# Patient Record
Sex: FEMALE | Race: WHITE | URBAN - METROPOLITAN AREA
[De-identification: names, ages, dates, MRNs, and addresses within clinical notes are randomized per-mention and may not be internally consistent; named-entity substitution may affect disease eponyms.]

---

## 2021-10-25 ENCOUNTER — INITIAL PRENATAL (OUTPATIENT)
Dept: OBGYN CLINIC | Age: 26
End: 2021-10-25

## 2021-10-25 ENCOUNTER — HOSPITAL ENCOUNTER (OUTPATIENT)
Age: 26
Setting detail: SPECIMEN
Discharge: HOME | End: 2021-10-25
Payer: COMMERCIAL

## 2021-10-25 VITALS — SYSTOLIC BLOOD PRESSURE: 110 MMHG | DIASTOLIC BLOOD PRESSURE: 60 MMHG | WEIGHT: 173.25 LBS

## 2021-10-25 DIAGNOSIS — Z3A.10 10 WEEKS GESTATION OF PREGNANCY: ICD-10-CM

## 2021-10-25 DIAGNOSIS — Z34.91 NORMAL PREGNANCY IN FIRST TRIMESTER: Primary | ICD-10-CM

## 2021-10-25 DIAGNOSIS — Z34.91 NORMAL PREGNANCY IN FIRST TRIMESTER: ICD-10-CM

## 2021-10-25 LAB
ABO/RH: NORMAL
ABSOLUTE EOS #: 0.08 K/UL (ref 0–0.44)
ABSOLUTE IMMATURE GRANULOCYTE: 0.03 K/UL (ref 0–0.3)
ABSOLUTE LYMPH #: 1.77 K/UL (ref 1.1–3.7)
ABSOLUTE MONO #: 0.51 K/UL (ref 0.1–1.2)
AMPHETAMINE SCREEN URINE: NEGATIVE
ANTIBODY SCREEN: NEGATIVE
BARBITURATE SCREEN URINE: NEGATIVE
BASOPHILS # BLD: 1 % (ref 0–2)
BASOPHILS ABSOLUTE: 0.05 K/UL (ref 0–0.2)
BENZODIAZEPINE SCREEN, URINE: NEGATIVE
BILIRUBIN URINE: NEGATIVE
BUPRENORPHINE URINE: NORMAL
CANNABINOID SCREEN URINE: NEGATIVE
COCAINE METABOLITE, URINE: NEGATIVE
COLOR: YELLOW
COMMENT UA: NORMAL
DIFFERENTIAL TYPE: ABNORMAL
EOSINOPHILS RELATIVE PERCENT: 1 % (ref 1–4)
GLUCOSE URINE: NEGATIVE
HCT VFR BLD CALC: 37.1 % (ref 36.3–47.1)
HEMOGLOBIN: 12.2 G/DL (ref 11.9–15.1)
HEPATITIS B SURFACE ANTIGEN: NONREACTIVE
HEPATITIS C ANTIBODY: NONREACTIVE
IMMATURE GRANULOCYTES: 0 %
KETONES, URINE: NEGATIVE
LEUKOCYTE ESTERASE, URINE: NEGATIVE
LYMPHOCYTES # BLD: 22 % (ref 24–43)
MCH RBC QN AUTO: 30.3 PG (ref 25.2–33.5)
MCHC RBC AUTO-ENTMCNC: 32.9 G/DL (ref 28.4–34.8)
MCV RBC AUTO: 92.3 FL (ref 82.6–102.9)
MDMA URINE: NORMAL
METHADONE SCREEN, URINE: NEGATIVE
METHAMPHETAMINE, URINE: NORMAL
MONOCYTES # BLD: 6 % (ref 3–12)
NITRITE, URINE: NEGATIVE
NRBC AUTOMATED: 0 PER 100 WBC
OPIATES, URINE: NEGATIVE
OXYCODONE SCREEN URINE: NEGATIVE
PDW BLD-RTO: 12.9 % (ref 11.8–14.4)
PH UA: 7 (ref 5–8)
PHENCYCLIDINE, URINE: NEGATIVE
PLATELET # BLD: 276 K/UL (ref 138–453)
PLATELET ESTIMATE: ABNORMAL
PMV BLD AUTO: 11.2 FL (ref 8.1–13.5)
PROPOXYPHENE, URINE: NORMAL
PROTEIN UA: NEGATIVE
RBC # BLD: 4.02 M/UL (ref 3.95–5.11)
RBC # BLD: ABNORMAL 10*6/UL
RUBV IGG SER QL: 395.7 IU/ML
SEG NEUTROPHILS: 70 % (ref 36–65)
SEGMENTED NEUTROPHILS ABSOLUTE COUNT: 5.81 K/UL (ref 1.5–8.1)
SPECIFIC GRAVITY UA: 1.01 (ref 1–1.03)
T. PALLIDUM, IGG: NONREACTIVE
TEST INFORMATION: NORMAL
TRICYCLIC ANTIDEPRESSANTS, UR: NORMAL
TURBIDITY: CLEAR
URINE HGB: NEGATIVE
UROBILINOGEN, URINE: NORMAL
WBC # BLD: 8.3 K/UL (ref 3.5–11.3)
WBC # BLD: ABNORMAL 10*3/UL

## 2021-10-25 PROCEDURE — 0500F INITIAL PRENATAL CARE VISIT: CPT | Performed by: NURSE PRACTITIONER

## 2021-10-25 NOTE — PATIENT INSTRUCTIONS
After Hours Number: You can call the office [phone]  or [phone]  Call if you have:  1. Bleeding like a period  2. Cramps or contractions greater than 2 hours  3. If you are leaking fluid  4. If you've a fever greater than 100°  5. If you feel as if baby is not moving  6. If you have continuous vomiting over 3-4 hours        Patient was counseled on weight gain in pregnancy with the following recommendation made based on her BMI:     Singletons:  BMI <18.5: 28-40 lbs weight gain  BMI 18.5-24.9: 25-35 lbs weight gain   BMI 25-29.9: 15-25 lbs weight gain  BMI >/= 30: 11-20 lbs weight gain    Up to 16 weeks around 1 pound a month  16-28 weeks- 2-4 pounds a month  >28 weeks - around 1 pound a week due to increased growth and blood volume      Twins:  BMI <18.5: no reccomendations  BMI 18.5-24.9: 37-45 lbs weight gain  BMI 25-29.9: 31-50 lbs weight gain  BMI >/= 30: 25-42 lbs weight gain    During Pregnancy: Exercises  Your Care Instructions  Here are some examples of exercises to do during your pregnancy. Start each exercise slowly. Ease off the exercise if you start to have pain. Your doctor or physical therapist will tell you when you can start these exercises and which ones will work best for you. How to do the exercises  Neck rotation    1. Sit in a firm chair, or stand up straight. 2. Keeping your chin level, turn your head to the right, and hold for 15 to 30 seconds. 3. Turn your head to the left and hold for 15 to 30 seconds. 4. Repeat 2 to 4 times to each side. Forward neck flexion    1. Sit in a firm chair, or stand up straight. 2. Bend your head forward. 3. Hold for 15 to 30 seconds. 4. Repeat 2 to 4 times. Back press    1. Place your feet 10 to 12 inches from the wall. 2. Rest your back flat against the wall and slide down the wall until your knees are slightly bent. 3. Press your lower back against the wall by pulling in your stomach muscles.   4. Hold for 6 seconds, and then relax your stomach muscles and slide back up the wall. 5. Repeat 8 to 12 times. Full body twist    1. Sit with your legs crossed. 2. Reach your left hand toward your right foot, and place your right hand at your side for support. 3. Slowly twist your torso to your right. 4. Switch your hands and twist to your left. 5. Repeat 2 to 4 times. Pelvic rocking    1. Kneeling on hands and knees, place your hands directly under your shoulders and your knees under your hips. 2. Breathe in deeply. Tuck your head downward and round your back up, making a curve with your back in the shape of the letter C. Hold this position for a count of 6.  3. Breathe out slowly and bring your head back up. Relax, keeping your back straight (don't allow it to curve toward the floor). Hold this for a count of 6.  4. Do this exercise 8 times or to your comfort level. Pelvic tilt    1. Lie on your back. 2. Keep your knees relaxed. 3. Tighten your belly and buttocks muscles. 4. At the same time, gently shift your pelvis upward. This should flatten the curve in your back. 5. Hold for 6 seconds and then relax. 6. Gradually increase the number of tilts you do each day, to your comfort level. Backward stretch    1. Kneel on hands and knees with your knees 8 to 10 inches apart, hands directly under your shoulders, and arms and back straight. 2. Keeping your arms straight, slowly lower your buttocks toward your heels and tuck your head toward your knees. Hold for 15 to 30 seconds. 3. Slowly return to the kneeling position. 4. Repeat 2 to 4 times. Forward bend    1. Sit comfortably in a chair, with your arms relaxed. 2. Slowly bend forward, allowing your arms to hang down in front of you. Lean only as far as you can without feeling discomfort or pressure on your belly. 3. Hold for 15 to 30 seconds and then slowly sit up straight. 4. Repeat 2 to 4 times or to your comfort level. Leg lift crawl    1.  Kneeling on hands and knees, place your hands directly under your shoulders and straighten your arms. 2. Tighten your belly muscles by pulling in your belly button toward your spine. Be sure you continue to breathe normally and do not hold your breath. 3. Lift your left knee and bring it toward your elbow. 4. Slowly extend your leg behind you without completely straightening it. Be careful not to let your hip drop down. Avoid arching your back. 5. Hold your leg behind you for about 6 seconds. 6. Return to your starting position. 7. Do the same exercise with your other leg. 8. Repeat 8 to 12 times for each leg. Tailor sitting    1. Sit on the floor. 2. Bring your feet close to your body while crossing your ankles. 3. Hold this position for as long as you are comfortable. Tailor stretching    1. Sit on the floor with your back straight, legs about 12 inches apart, and feet relaxed outward. 2. Stretch your hands forward toward your left foot, then sit up. 3. Stretch your hands straight forward, then sit up. 4. Stretch your hands forward toward your right foot, then sit up. 5. Hold each stretch for 15 to 30 seconds. 6. Repeat 2 to 4 times. Follow-up care is a key part of your treatment and safety. Be sure to make and go to all appointments, and call your doctor if you are having problems. It's also a good idea to know your test results and keep a list of the medicines you take. Where can you learn more? Go to https://[employer] FoundationvitalyInStaff.dscovered. org and sign in to your [employer] account. Enter M866 in the Press box to learn more about \"During Pregnancy: Exercises. \"     If you do not have an account, please click on the \"Sign Up Now\" link. Current as of: September 5, 2018  Content Version: 12.0  © 2444-0444 Healthwise, Incorporated. Care instructions adapted under license by [address]  If you have questions about a medical condition or this instruction, always ask your healthcare professional. [employer], [facility] disclaims any warranty or liability for your use of this information. Nutrition During Pregnancy: Care Instructions  Your Care Instructions    Healthy eating when you are pregnant is important for you and your baby. It can help you feel well and have a successful pregnancy and delivery. During pregnancy your nutrition needs increase. Even if you have excellent eating habits, your doctor may recommend a multivitamin to make sure you get enough iron and folic acid. Many pregnant women wonder how much weight they should gain. In general, women who were at a healthy weight before they became pregnant should gain between 25 and 35 pounds. Women who were overweight before pregnancy are usually advised to gain 15 to 25 pounds. Women who were underweight before pregnancy are usually advised to gain 28 to 40 pounds. Your doctor will work with you to set a weight goal that is right for you. Gaining a healthy amount of weight helps you have a healthy baby. Follow-up care is a key part of your treatment and safety. Be sure to make and go to all appointments, and call your doctor if you are having problems. It's also a good idea to know your test results and keep a list of the medicines you take. How can you care for yourself at home? · Eat plenty of fruits and vegetables. Include a variety of orange, yellow, and leafy dark-green vegetables every day. · Choose whole-grain bread, cereal, and pasta. Good choices include whole wheat bread, whole wheat pasta, brown rice, and oatmeal.  · Get 4 or more servings of milk and milk products each day. Good choices include nonfat or low-fat milk, yogurt, and cheese. If you cannot eat milk products, you can get calcium from calcium-fortified products such as orange juice, soy milk, and tofu. Other non-milk sources of calcium include leafy green vegetables, such as broccoli, kale, mustard greens, turnip greens, bok evie, and brussels sprouts.   · If you Incorporated disclaims any warranty or liability for your use of this information. Managing Morning Sickness: Care Instructions  Your Care Instructions    For many women, the toughest part of early pregnancy is morning sickness. Morning sickness can range from mild nausea to severe nausea with bouts of vomiting. Symptoms may be worse in the morning, although they can strike at any time of the day or night. If you have nausea, vomiting, or both, look for safe measures that can bring you relief. You can take simple steps at home to manage morning sickness. These steps include changing what and when you eat and avoiding certain foods and smells. Some women find that acupuncture and acupressure wristbands also help. Follow-up care is a key part of your treatment and safety. Be sure to make and go to all appointments, and call your doctor if you are having problems. It's also a good idea to know your test results and keep a list of the medicines you take. How can you care for yourself at home? · Keep food in your stomach, but not too much at once. Your nausea may be worse if your stomach is empty. Eat five or six small meals a day instead of three large meals. · For morning nausea, eat a small snack, such as a couple of crackers or dry biscuits, before rising. Allow a few minutes for your stomach to settle before you get out of bed slowly. · Drink plenty of fluids, enough so that your urine is light yellow or clear like water. If you have kidney, heart, or liver disease and have to limit fluids, talk with your doctor before you increase the amount of fluids you drink. Some women find that peppermint tea helps with nausea. · Eat more protein, such as chicken, fish, lean meat, beans, nuts, and seeds. · Eat carbohydrate foods, such as potatoes, whole-grain cereals, rice, and pasta. · Avoid smells and foods that make you feel nauseated.  Spicy or high-fat foods, citrus juice, milk, coffee, and tea with caffeine often make nausea worse. · Do not drink alcohol. · Do not smoke. Try not to be around others who smoke. If you need help quitting, talk to your doctor about stop-smoking programs and medicines. These can increase your chances of quitting for good. · If you are taking iron supplements, ask your doctor if they are necessary. Iron can make nausea worse. · Get lots of rest. Stress and fatigue can make your morning sickness worse. · Ask your doctor about taking prescription medicine, or over-the-counter products such as vitamin B6, doxylamine, or donna, to relieve your symptoms. Your doctor can tell you the doses that are safe for you. · Take your prenatal vitamins at night on a full stomach. When should you call for help? Call 911 anytime you think you may need emergency care. For example, call if:    · You passed out (lost consciousness). Call your doctor now or seek immediate medical care if:    · You are sick to your stomach or cannot drink fluids. · You have symptoms of dehydration, such as:  ? Dry eyes and a dry mouth. ? Passing only a little urine. ? Feeling thirstier than usual.     · You are not able to keep down your medicine. · You have pain in your belly or pelvis. Watch closely for changes in your health, and be sure to contact your doctor if:    · You do not get better as expected. Where can you learn more? Go to https://Malharpeshaguftaeb.WeAreHolidays. org and sign in to your Updater account. Enter H945 in the [facility] box to learn more about \"Managing Morning Sickness: Care Instructions. \"     If you do not have an account, please click on the \"Sign Up Now\" link. Current as of: September 5, 2018  Content Version: 12.0  © 6033-3650 Healthwise, Incorporated. Care instructions adapted under license by [facility] ([facility]).  If you have questions about a medical condition or this instruction, always ask your healthcare professional. [address] position keeps your baby off your belly. Tuck your baby under your arm, with his or her body along the side you will be feeding on. Support your baby's upper body with your arm. With that hand you can control your baby's head to bring his or her mouth to your breast.  ? Try different positions with each feeding. If you are having problems, ask for help from your doctor or a lactation consultant. · To get your baby to latch on:  ? Support and narrow your breast with one hand using a \"U hold,\" with your thumb on the outer side of your breast and your fingers on the inner side. You can also use a \"C hold,\" with all your fingers below the nipple and your thumb above it. Try the different holds to get the deepest latch for whichever breastfeeding position you use. Your other arm is behind your baby's back, with your hand supporting the base of the baby's head. Position your fingers and thumb to point toward your baby's ears. ? You can touch your baby's lower lip with your nipple to get your baby to open his or her mouth. Wait until your baby opens up really wide, like a big yawn. Then be sure to bring the baby quickly to your breastnot your breast to the baby. As you bring your baby toward your breast, use your other hand to support the breast and guide it into his or her mouth. ? Both the nipple and a large portion of the darker area around the nipple (areola) should be in the baby's mouth. The baby's lips should be flared outward, not folded in (inverted). ? Listen for a regular sucking and swallowing pattern while the baby is feeding. If you cannot see or hear a swallowing pattern, watch the baby's ears, which will wiggle slightly when the baby swallows. If the baby's nose appears to be blocked by your breast, bring your baby's body closer to you. This will help tilt the baby's head back slightly, so just the edge of one nostril is clear for breathing. ?  When your baby is latched, you can usually remove your hand from supporting your breast and bring it under your baby to cradle him or her. Now just relax and breastfeed your baby. · You will know that your baby is feeding well when:  ? His or her mouth covers a lot of the areola, and the lips are flared out.  ? His or her chin and nose rest against your breast.  ? Sucking is deep and rhythmic, with short pauses. ? You are able to see and hear your baby swallowing. ? You do not feel pain in your nipple. · Offer both breasts to your baby at each feeding. Each time you breastfeed, switch which breast you start with. · Anytime you need to remove your baby from the breast, put one finger in the corner of his or her mouth. Push your finger between your baby's gums to gently break the seal. If you do not break the tight seal before you remove your baby, your nipples can become sore, cracked, or bruised. · After feeding your baby, gently pat his or her back to let out any swallowed air. After your baby burps, offer the breast again, or offer the other breast. Sometimes a baby will want to keep feeding after being burped. When should you call for help? Call your doctor now or seek immediate medical care if:    · You have symptoms of a breast infection, such as:  ? Increased pain, swelling, redness, or warmth around a breast.  ? Red streaks extending from the breast.  ? Pus draining from a breast.  ? A fever. · Your baby has no wet diapers for 6 hours. Watch closely for changes in your health, and be sure to contact your doctor if:    · Your baby has trouble latching on to your breast.     · You continue to have pain or discomfort when breastfeeding. · You have other questions or concerns. Where can you learn more? Go to https://TapteravitalyFastly.Mobisante. org and sign in to your dermSearch account. Enter P492 in the Postdeck box to learn more about \"Breastfeeding: Care Instructions. \"     If you do not have an account, please click on the \"Sign Up Now\" link. Current as of: September 5, 2018  Content Version: 12.0  © 6050-6103 [employer]. Care instructions adapted under license by [facility] ([facility]). If you have questions about a medical condition or this instruction, always ask your healthcare professional. [address] any warranty or liability for your use of this information. Learning About Birth Defects Testing  What is birth defects testing? Birth defects testing is done during pregnancy to look for possible problems with the baby (fetus). A birth defect may have only a minor impact on a child's life. Or it can have a major effect on quality or length of life. You and your doctor can choose from many tests. You may have no tests, one test, or many tests. Talking with a genetic counselor may help you make decisions about testing. The counselor is trained to help you understand these tests. He or she can also help you find resources for support. What are the types of tests? You may have a screening test or a diagnostic test. Or you may have both types of tests. Screening tests show the chance that a baby has a certain birth defect, such as Down syndrome, spina bifida, or trisomy 25. Diagnostic tests show if a baby has a certain birth defect. · Screening tests and when they are done:  ? Blood tests at 10 to 13 weeks (first trimester)  ? Cell free fetal DNA test at 10 weeks or later (first trimester)  ? Nuchal translucency test at 11 to 14 weeks (first trimester)  ? Triple or quad screening at 15 to 20 weeks (second trimester)  ? Ultrasound at 18 to 20 weeks (second trimester)  · Diagnostic tests and when they are done:  ? Chorionic villus sampling (CVS) at 10 to 13 weeks (first trimester)  ? Amniocentesis at 13 to 20 weeks (second trimester)  In some cases, the doctors look at the combined screenings that you've had over a period of time. This is called an integrated screening.   What are the screening tests?  · Blood tests are done to look at different substances in your blood. These tests include cell free fetal DNA and triple or quadruple (quad) blood tests. Both of these tests can help find genetic problems. · Nuchal translucency test uses ultrasound to measure the thickness of the area at the back of the baby's neck. An increase in thickness can be an early sign of certain birth defects. Ultrasound is a tool that uses sound waves to make pictures of your baby and placenta inside the uterus. · Ultrasound lets your doctor see an image of your baby. It can help your doctor look for problems of the heart, spine, belly, or other areas. What are the diagnostic tests? Chorionic villus sampling (CVS) looks at cells from the placenta. To do the test, your doctor may put a thin tube through your vagina and cervix to take out a small piece of the placenta. Or the doctor may take out the piece through a needle in your belly. This test can diagnose many genetic diseases. But it can't find problems with the spinal cord. Amniocentesis looks at the amniotic fluid that surrounds your baby. Your doctor will put a needle through your belly into your uterus and take out a very small amount of fluid to test.  What are the risks of these tests? There is a small risk of a miscarriage after a CVS or amniocentesis. Your doctor or genetic counselor can help you understand this risk. These tests are generally very safe. Where can you learn more? Go to https://Athlete BuilderpePocket Communications Northeast.Xterprise Solutions. org and sign in to your Chronogolf account. Enter G030 in the [facility] box to learn more about \"Learning About Birth Defects Testing. \"     If you do not have an account, please click on the \"Sign Up Now\" link. Current as of: September 5, 2018  Content Version: 12.0  © 4076-3337 Healthwise, Incorporated. Care instructions adapted under license by [facility] ([facility]).  If you have questions about a medical condition or this instruction, always ask your healthcare professional. [place] [address] any warranty or liability for your use of this information.

## 2021-10-25 NOTE — PROGRESS NOTES
OB History    Para Term  AB Living   1             SAB TAB Ectopic Molar Multiple Live Births                    # Outcome Date GA Lbr Yared/2nd Weight Sex Delivery Anes PTL Lv   1 Current               Sebastian May is being seen today for her new obstetrical visit. The pregnancy is  a planned pregnancy. She is  accepting at this time. Her last period was Patient's last menstrual period was 2021. .  This was a sure and definite menses. She was not on OCP at conception. 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[address] Avenue:  [address] [place] [address]    Teratogen exposure since LMP: (OTC/prescription/ETOH/drugs):   denies    History of prior GBS-infected child:  NA  Recent travel outside of country (partner also):  no    Known COVID/Zika exposure (partner also): no  Any history of genetic or chromosomal aberations in herself the father to be or their respective families: no  Any histories of spina bifida or abdominal wall defects; omphalocele's or gastroschisis no  Hx Hypothyroidism: denies  Hx preeclampsia or HTN:  denies  Hx PPD: N/A  Hx Diabetes: denies  Hx GDM: N/A  First degree relative with diabetes: denies       Not on File  No current outpatient medications on file. No current facility-administered medications for this visit. No past medical history on file. No past surgical history on file. Swelling: no  Bleeding: no  Discharge: no   Dysuria: no  Nausea: yes -  encouraged small frequent meals, and vitamin B6 and unisom if needed. Heartburn: no  Epigastric pain: no       Office protocol reviewed, After hours number provided.    Diet and exercise reveiwed  Warning signs reviewed  Testing reviewed     Q&A  Discussed in detail referral/orders for  for 1st trimester screen vs NIPSinfo provided for screening  Discussed with patient that if they proceed with the NIPs testing then they will be opting out of 1st trimester screening which can provide information in regards to placental health, congenital heart defects, metabolic abnormalities, and anatomic abnormalities. Patient is aware and has decided to: nure eric till disucss further and let us know  Discussed dates and orders for Anatomy USd and fetal echo if indicated. Breastfeeding education. She verbally consented to HIV testing and drug screening. She was counseled on Toxoplasmosis/Listeriosis (cats/raw meat). Prenatal Vitamins recommended. Pelvic exam completed PAP and cultures obtained and sent. Prenatal labs and dating us ordered. RV prn/ 4 weeks     Of the 30 minute duration appointment visit, Gracelyn Holstein CNP spent at least 50% of the face-to-face time in counseling, explanation of diagnosis, planning of further management, and answering all questions.

## 2021-10-26 LAB
DIRECT EXAM: NORMAL
HIV AG/AB: NONREACTIVE
Lab: NORMAL
SPECIMEN DESCRIPTION: NORMAL

## 2021-10-27 LAB
CHLAMYDIA BY THIN PREP: NEGATIVE
N. GONORRHOEAE DNA, THIN PREP: NEGATIVE
SPECIMEN DESCRIPTION: NORMAL
VZV IGG SER QL IA: 2.46

## 2021-10-29 LAB — CYTOLOGY REPORT: NORMAL

## 2021-11-04 DIAGNOSIS — Z34.91 NORMAL PREGNANCY IN FIRST TRIMESTER: Primary | ICD-10-CM

## 2021-11-04 DIAGNOSIS — Z3A.11 11 WEEKS GESTATION OF PREGNANCY: ICD-10-CM

## 2021-11-10 ENCOUNTER — ROUTINE PRENATAL (OUTPATIENT)
Dept: PERINATAL CARE | Age: 26
End: 2021-11-10
Payer: COMMERCIAL

## 2021-11-10 VITALS
WEIGHT: 176 LBS | SYSTOLIC BLOOD PRESSURE: 120 MMHG | HEART RATE: 87 BPM | HEIGHT: 66 IN | DIASTOLIC BLOOD PRESSURE: 78 MMHG | TEMPERATURE: 97.4 F | RESPIRATION RATE: 16 BRPM | BODY MASS INDEX: 28.28 KG/M2

## 2021-11-10 DIAGNOSIS — O36.80X0 ENCOUNTER TO DETERMINE FETAL VIABILITY OF PREGNANCY, SINGLE OR UNSPECIFIED FETUS: ICD-10-CM

## 2021-11-10 DIAGNOSIS — Z3A.12 12 WEEKS GESTATION OF PREGNANCY: ICD-10-CM

## 2021-11-10 DIAGNOSIS — Z36.9 FIRST TRIMESTER SCREENING: Primary | ICD-10-CM

## 2021-11-10 LAB
CRL: NORMAL
SAC DIAMETER: NORMAL

## 2021-11-10 PROCEDURE — 76813 OB US NUCHAL MEAS 1 GEST: CPT | Performed by: OBSTETRICS & GYNECOLOGY

## 2021-11-10 PROCEDURE — 76801 OB US < 14 WKS SINGLE FETUS: CPT | Performed by: OBSTETRICS & GYNECOLOGY

## 2021-11-22 ENCOUNTER — ROUTINE PRENATAL (OUTPATIENT)
Dept: OBGYN CLINIC | Age: 26
End: 2021-11-22

## 2021-11-22 VITALS — WEIGHT: 182 LBS | DIASTOLIC BLOOD PRESSURE: 72 MMHG | SYSTOLIC BLOOD PRESSURE: 118 MMHG | BODY MASS INDEX: 29.38 KG/M2

## 2021-11-22 DIAGNOSIS — Z3A.14 14 WEEKS GESTATION OF PREGNANCY: ICD-10-CM

## 2021-11-22 DIAGNOSIS — Z34.92 NORMAL PREGNANCY IN SECOND TRIMESTER: Primary | ICD-10-CM

## 2021-11-22 PROCEDURE — 0502F SUBSEQUENT PRENATAL CARE: CPT | Performed by: NURSE PRACTITIONER

## 2021-11-22 NOTE — PROGRESS NOTES
Presents for OB visit  Gestation 14w0d  Estimated Date of Delivery: 22    G1  - Jemima Lagunas  Undecided if NIPS or FTS  Bill under Dr. Jyoti Juárez           The patient was seen and evaluated. There was Positive fetal movements no. No contractions or leakage of fluid. Signs and symptoms of  labor as well as labor were reviewed. The Nuchal Translucency testing was reviewed and found to be completed. A single marker MSAFP was ordered/ declined for a 15-20 week gestational age window. TOP ST OH Reviewed. Dates were reviewed with the patient. An 18-22 week anatomy ultrasound has been ordered and scheduled 22. The patient will return to the office for her next visit in 4 weeks. See antepartum flow sheet. OB History    Para Term  AB Living   1             SAB IAB Ectopic Molar Multiple Live Births                    # Outcome Date GA Lbr Yared/2nd Weight Sex Delivery Anes PTL Lv   1 Current                     Allergies   Allergen Reactions    Latex      Current Outpatient Medications   Medication Sig Dispense Refill    Prenatal Vit-Fe Fumarate-FA (PRENATAL PO) Take by mouth       No current facility-administered medications for this visit. No past medical history on file. No past surgical history on file. Headaches: no  Swelling: no  Bleeding: no  Discharge: no   Dysuria: no  Nausea: no  Heartburn: no  Epigastric pain: no        Return 4 WEEKS    Labs as indicated AFP    PTL signs reviewed, if indicated  Kick Count Instructions given if indicated: The patient was instructed on fetal kick counts and was given a kick sheet to complete every 8 hours. This is to begin at 28 weeks gestation. She was instructed that the baby should move at a minimum of ten times within one hour after a meal. The patient was instructed to lay down on her left side twenty minutes after eating and count movements for up to one hour with a target value of ten movements.    She was instructed to notify the office if she did not make that target after two attempts or if after any attempt there was less than four movements. After hour numbers reviewed , along with labor signs if indicated. Contractions/cramping between 5-7 minutes and persisting even with attempts of increased water and laying on side. Fluid leakage, bleeding        Of the 20 minute duration appointment visit, Chito Cole CNP spent at least 50% of the face-to-face time in counseling, explanation of diagnosis, planning of further management, and answering all questions.

## 2021-11-22 NOTE — PATIENT INSTRUCTIONS
Patient Education        Weeks 14 to 18 of Your Pregnancy: Care Instructions  Overview     During this time, you may start to \"show,\" so that you look pregnant to people around you. You may also notice some changes in your skin, such as itchy spots on your palms or acne on your face. Your baby is now able to pass urine. And your baby's first stool (meconium) is starting to collect in your baby's intestines. Hair is also starting to grow on your baby's head. At your next visit, between weeks 18 and 20, your doctor may do an ultrasound test. The test allows your doctor to check for certain problems. Your doctor can also tell the sex of your baby. So this a good time to think about whether you want to know. Talk to your doctor about getting a flu shot to help keep you healthy during your pregnancy. As your pregnancy moves along, it's common to worry or feel anxious. Your body is changing a lot. And you are thinking about giving birth, the health of your baby, and becoming a parent. You can talk to your doctor about any anxiety and stress you feel. Follow-up care is a key part of your treatment and safety. Be sure to make and go to all appointments, and call your doctor if you are having problems. It's also a good idea to know your test results and keep a list of the medicines you take. How can you care for yourself at home? Reduce stress    · Ask for help with cooking and housekeeping.     · Figure out who or what causes your stress. Avoid these people or situations as much as possible.     · Relax every day. Taking 10- to 15-minute breaks can make a big difference. Take a walk, listen to music, or take a warm bath.     · Learn relaxation techniques at prenatal or yoga class. Or buy a relaxation tape.     · List your fears about having a baby and becoming a parent. Share the list with someone you trust. Decide which worries are really small, and try to let them go.    Exercise    · If you did not exercise much or this instruction, always ask your healthcare professional. [place] [address] any warranty or liability for your use of this information.

## 2021-12-22 ENCOUNTER — ROUTINE PRENATAL (OUTPATIENT)
Dept: OBGYN CLINIC | Age: 26
End: 2021-12-22

## 2021-12-22 VITALS
BODY MASS INDEX: 29.79 KG/M2 | SYSTOLIC BLOOD PRESSURE: 112 MMHG | DIASTOLIC BLOOD PRESSURE: 68 MMHG | WEIGHT: 184.56 LBS

## 2021-12-22 DIAGNOSIS — Z34.92 NORMAL PREGNANCY IN SECOND TRIMESTER: Primary | ICD-10-CM

## 2021-12-22 DIAGNOSIS — Z3A.18 18 WEEKS GESTATION OF PREGNANCY: ICD-10-CM

## 2021-12-22 PROCEDURE — 0502F SUBSEQUENT PRENATAL CARE: CPT | Performed by: OBSTETRICS & GYNECOLOGY

## 2021-12-22 NOTE — PROGRESS NOTES
Ruben Carlton is a 32 y.o. female 22w1d        OB History    Para Term  AB Living   1             SAB IAB Ectopic Molar Multiple Live Births                    # Outcome Date GA Lbr Yared/2nd Weight Sex Delivery Anes PTL Lv   1 Current                Vitals  BP: 112/68  Weight: 184 lb 9 oz (83.7 kg)    The patient was seen and evaluated. No contractions or leakage of fluid. Signs and symptoms of  labor as well as labor were reviewed. The Nuchal Translucency testing was reviewed and found to be normal A single marker MSAFP was reviewed and found to be ordered. The patients anatomy ultrasound has been completed and reviewed with patient. The S/S of Pre-Eclampsia were reviewed with the patient in detail. She is to report any of these if they occur. She currently denies any of these. The patient is RH positive Rhogam Ordered no    The patient was instructed on fetal kick counts and was given a kick sheet to complete every 8 hours. This is to begin at 28 weeks gestation. She was instructed that the baby should move at a minimum of ten times within one hour after a meal. The patient was instructed to lay down on her left side twenty minutes after eating and count movements for up to one hour with a target value of ten movements. She was instructed to notify the office if she did not make that target after two attempts or if after any attempt there was less than four movements. G1  - Marvel  FTS normal  Bill under Dr. Mansoor Young:  1. Ruben Carlton is a 32 y.o. female  2.   3. 18w2d    There are no problems to display for this patient. Diagnosis Orders   1. Normal pregnancy in second trimester     2. 18 weeks gestation of pregnancy           Plan:  The patient will return to the office for her next visit in 4 weeks. See antepartum flow sheet. [facility] anatomy ultrasound scheduled        Patient was seen with total face to face time of 20 minutes.  More than 50% of this visit was on counseling and education regarding her    Diagnosis Orders   1. Normal pregnancy in second trimester     2. 18 weeks gestation of pregnancy      and her options. She was also counseled on her preventative health maintenance recommendations and follow-up.

## 2022-01-06 ENCOUNTER — ROUTINE PRENATAL (OUTPATIENT)
Dept: PERINATAL CARE | Age: 27
End: 2022-01-06
Payer: COMMERCIAL

## 2022-01-06 VITALS
RESPIRATION RATE: 16 BRPM | SYSTOLIC BLOOD PRESSURE: 124 MMHG | DIASTOLIC BLOOD PRESSURE: 66 MMHG | TEMPERATURE: 98.4 F | WEIGHT: 187 LBS | HEART RATE: 88 BPM | BODY MASS INDEX: 30.05 KG/M2 | HEIGHT: 66 IN

## 2022-01-06 DIAGNOSIS — Z3A.20 20 WEEKS GESTATION OF PREGNANCY: ICD-10-CM

## 2022-01-06 DIAGNOSIS — O99.212 OBESITY AFFECTING PREGNANCY IN SECOND TRIMESTER: ICD-10-CM

## 2022-01-06 DIAGNOSIS — Z36.86 SCREENING, ANTENATAL, FOR RISK OF PRE-TERM LABOR: ICD-10-CM

## 2022-01-06 DIAGNOSIS — O44.40 LOW IMPLANTATION OF PLACENTA WITHOUT HEMORRHAGE: Primary | ICD-10-CM

## 2022-01-06 LAB
ABDOMINAL CIRCUMFERENCE: NORMAL
ABDOMINAL CIRCUMFERENCE: NORMAL
BIPARIETAL DIAMETER: NORMAL
BIPARIETAL DIAMETER: NORMAL
ESTIMATED FETAL WEIGHT: NORMAL
ESTIMATED FETAL WEIGHT: NORMAL
FEMORAL DIAMETER: NORMAL
FEMORAL DIAMETER: NORMAL
HC/AC: NORMAL
HC/AC: NORMAL
HEAD CIRCUMFERENCE: NORMAL
HEAD CIRCUMFERENCE: NORMAL

## 2022-01-06 PROCEDURE — 76817 TRANSVAGINAL US OBSTETRIC: CPT | Performed by: OBSTETRICS & GYNECOLOGY

## 2022-01-06 PROCEDURE — 76811 OB US DETAILED SNGL FETUS: CPT | Performed by: OBSTETRICS & GYNECOLOGY

## 2022-01-19 ENCOUNTER — ROUTINE PRENATAL (OUTPATIENT)
Dept: OBGYN CLINIC | Age: 27
End: 2022-01-19

## 2022-01-19 VITALS — BODY MASS INDEX: 30.83 KG/M2 | DIASTOLIC BLOOD PRESSURE: 72 MMHG | SYSTOLIC BLOOD PRESSURE: 126 MMHG | WEIGHT: 191 LBS

## 2022-01-19 DIAGNOSIS — Z34.92 NORMAL PREGNANCY IN SECOND TRIMESTER: Primary | ICD-10-CM

## 2022-01-19 DIAGNOSIS — Z3A.22 22 WEEKS GESTATION OF PREGNANCY: ICD-10-CM

## 2022-01-19 PROCEDURE — 0502F SUBSEQUENT PRENATAL CARE: CPT | Performed by: OBSTETRICS & GYNECOLOGY

## 2022-01-20 NOTE — PROGRESS NOTES
Tamra Bee is a 32 y.o. female 24w4d        OB History    Para Term  AB Living   1             SAB IAB Ectopic Molar Multiple Live Births                    # Outcome Date GA Lbr Yared/2nd Weight Sex Delivery Anes PTL Lv   1 Current                Vitals  BP: 126/72  Weight: 191 lb (86.6 kg)  Patient Position: Sitting  Fetal Heart Rate: 155  Movement: Present    + FM  NO VB  NO LOF  NO CTX  No complaints or issues    G1  - Marvel  FTS normal  Bill under Dr. Griffin Vazquez:  1. Tamra Bee is a 32 y.o. female  2.   3. 22w2d       Diagnosis Orders   1. Normal pregnancy in second trimester     2. 22 weeks gestation of pregnancy           Plan:  The patient will return to the office for her next visit in 4 weeks. See antepartum flow sheet.

## 2022-01-24 DIAGNOSIS — O28.9 ABNORMAL FIRST TRIMESTER SCREEN: Primary | ICD-10-CM

## 2022-02-16 ENCOUNTER — ROUTINE PRENATAL (OUTPATIENT)
Dept: OBGYN CLINIC | Age: 27
End: 2022-02-16

## 2022-02-16 VITALS
SYSTOLIC BLOOD PRESSURE: 116 MMHG | DIASTOLIC BLOOD PRESSURE: 70 MMHG | WEIGHT: 198.38 LBS | BODY MASS INDEX: 32.02 KG/M2

## 2022-02-16 DIAGNOSIS — Z3A.26 26 WEEKS GESTATION OF PREGNANCY: ICD-10-CM

## 2022-02-16 DIAGNOSIS — Z34.92 NORMAL PREGNANCY IN SECOND TRIMESTER: Primary | ICD-10-CM

## 2022-02-16 PROCEDURE — 0502F SUBSEQUENT PRENATAL CARE: CPT | Performed by: OBSTETRICS & GYNECOLOGY

## 2022-02-17 NOTE — PROGRESS NOTES
Chayito Hernandez is a 32 y.o. female 34w1d        OB History    Para Term  AB Living   1             SAB IAB Ectopic Molar Multiple Live Births                    # Outcome Date GA Lbr Yared/2nd Weight Sex Delivery Anes PTL Lv   1 Current                Vitals  BP: 116/70  Weight: 198 lb 6 oz (90 kg)  Patient Position: Sitting      The patient was seen and evaluated. There was positive fetal movements. No contractions or leakage of fluid. Signs and symptoms of  labor as well as labor were reviewed. The S/S of Pre-Eclampsia were reviewed with the patient in detail. She is to report any of these if they occur. She currently denies any of these. The patient had her 28 week labs ordered. No visits with results within 5 Week(s) from this visit. Latest known visit with results is:   Hospital Outpatient Visit on 10/25/2021   Component Date Value Ref Range Status    Hepatitis C Ab 10/25/2021 NONREACTIVE  NONREACTIVE Final    Comment:       The hepatitis C procedure used in our laboratory is a Chemiluminescent test specific for   three recombinant HCV antigens. A negative anti-HCV result indicates that the antibodies to   hepatitis C virus are not present at this time. Individuals with reactive anti-HCV should be considered infected and infectious until proven   otherwise. Confirmation of all equivocal or reactive results is recommended by ordering   HCV RNA by PCR.       VARICELLA ZOSTER AB IGG 10/25/2021 2.46  >1.09 Final    Comment:    Interpretation:  IMMUNE     Reference Range:  <0.91         Not Immune  0.91-1.09     Equivocal  >1.09         Immune         ABO/Rh 10/25/2021 O POSITIVE   Final    Antibody Screen 10/25/2021 NEGATIVE   Final    WBC 10/25/2021 8.3  3.5 - 11.3 k/uL Final    RBC 10/25/2021 4.02  3.95 - 5.11 m/uL Final    Hemoglobin 10/25/2021 12.2  11.9 - 15.1 g/dL Final    Hematocrit 10/25/2021 37.1  36.3 - 47.1 % Final    MCV 10/25/2021 92.3  82.6 - 102.9 fL Final    MCH 10/25/2021 30.3  25.2 - 33.5 pg Final    MCHC 10/25/2021 32.9  28.4 - 34.8 g/dL Final    RDW 10/25/2021 12.9  11.8 - 14.4 % Final    Platelets 43/79/5667 276  138 - 453 k/uL Final    MPV 10/25/2021 11.2  8.1 - 13.5 fL Final    NRBC Automated 10/25/2021 0.0  0.0 per 100 WBC Final    Differential Type 10/25/2021 NOT REPORTED   Final    Seg Neutrophils 10/25/2021 70* 36 - 65 % Final    Lymphocytes 10/25/2021 22* 24 - 43 % Final    Monocytes 10/25/2021 6  3 - 12 % Final    Eosinophils % 10/25/2021 1  1 - 4 % Final    Basophils 10/25/2021 1  0 - 2 % Final    Immature Granulocytes 10/25/2021 0  0 % Final    Segs Absolute 10/25/2021 5.81  1.50 - 8.10 k/uL Final    Absolute Lymph # 10/25/2021 1.77  1.10 - 3.70 k/uL Final    Absolute Mono # 10/25/2021 0.51  0.10 - 1.20 k/uL Final    Absolute Eos # 10/25/2021 0.08  0.00 - 0.44 k/uL Final    Basophils Absolute 10/25/2021 0.05  0.00 - 0.20 k/uL Final    Absolute Immature Granulocyte 10/25/2021 0.03  0.00 - 0.30 k/uL Final    WBC Morphology 10/25/2021 NOT REPORTED   Final    RBC Morphology 10/25/2021 NOT REPORTED   Final    Platelet Estimate 93/47/7676 NOT REPORTED   Final    Hepatitis B Surface Ag 10/25/2021 NONREACTIVE  NONREACTIVE Final    Rubella Antibody, IGG 10/25/2021 395.7  IU/mL Final    Comment:             REFERENCE RANGE:  <5.0       NON-REACTIVE (non-immune)  5.0 TO 9.9 EQUIVOCAL  >=10.0     REACTIVE     (immune)            T. pallidum, IgG 10/25/2021 NONREACTIVE  NONREACTIVE Final    Comment:       T. pallidum antibodies are not detected. There is no serological evidence of infection with T. pallidum (early primary syphilis   cannot be excluded). Retest in 2-4 weeks if syphilis is clinically suspect.  HIV Ag/Ab 10/25/2021 NONREACTIVE  NONREACTIVE Final    Comment: No laboratory evidence of HIV infection. If acute HIV infection is suspected, consider   testing for HIV-1 RNA.       Amphetamine Screen, Ur 10/25/2021 NEGATIVE  NEGATIVE Final    Comment:       (Positive cutoff 1000 ng/mL)                  Barbiturate Screen, Ur 10/25/2021 NEGATIVE  NEGATIVE Final    Comment:       (Positive cutoff 200 ng/mL)                  Benzodiazepine Screen, Urine 10/25/2021 NEGATIVE  NEGATIVE Final    Comment:       (Positive cutoff 200 ng/mL)                  Cocaine Metabolite, Urine 10/25/2021 NEGATIVE  NEGATIVE Final    Comment:       (Positive cutoff 300 ng/mL)                  Methadone Screen, Urine 10/25/2021 NEGATIVE  NEGATIVE Final    Comment:       (Positive cutoff 300 ng/mL)                  Opiates, Urine 10/25/2021 NEGATIVE  NEGATIVE Final    Comment:       (Positive cutoff 300 ng/mL)                  Phencyclidine, Urine 10/25/2021 NEGATIVE  NEGATIVE Final    Comment:       (Positive cutoff 25 ng/mL)                  Propoxyphene, Urine 10/25/2021 NOT REPORTED  NEGATIVE Final    Cannabinoid Scrn, Ur 10/25/2021 NEGATIVE  NEGATIVE Final    Comment:       (Positive cutoff 50 ng/mL)                  Oxycodone Screen, Ur 10/25/2021 NEGATIVE  NEGATIVE Final    Comment:       (Positive cutoff 100 ng/mL)                  Methamphetamine, Urine 10/25/2021 NOT REPORTED  NEGATIVE Final    Tricyclic Antidepressants, Urine 10/25/2021 NOT REPORTED  NEGATIVE Final    MDMA, Urine 10/25/2021 NOT REPORTED  NEGATIVE Final    Buprenorphine Urine 10/25/2021 NOT REPORTED  NEGATIVE Final    Test Information 10/25/2021 Assay provides medical screening only. The absence of expected drug(s) and/or metabolite(s) may indicate diluted or adulterated urine, limitations of testing or timing of collection. Final    Comment: Testing for legal purposes should be confirmed by another method. To request confirmation   of test result, please call the lab within 7 days of sample submission.       Color, UA 10/25/2021 Yellow  Yellow Final    Turbidity UA 10/25/2021 Clear  Clear Final    Glucose, Ur 10/25/2021 NEGATIVE  NEGATIVE Final    Bilirubin Urine 10/25/2021 NEGATIVE  NEGATIVE Final    Ketones, Urine 10/25/2021 NEGATIVE  NEGATIVE Final    Specific Venus, UA 10/25/2021 1.010  1.005 - 1.030 Final    Urine Hgb 10/25/2021 NEGATIVE  NEGATIVE Final    pH, UA 10/25/2021 7.0  5.0 - 8.0 Final    Protein, UA 10/25/2021 NEGATIVE  NEGATIVE Final    Urobilinogen, Urine 10/25/2021 Normal  Normal Final    Nitrite, Urine 10/25/2021 NEGATIVE  NEGATIVE Final    Leukocyte Esterase, Urine 10/25/2021 NEGATIVE  NEGATIVE Final    Urinalysis Comments 10/25/2021 Microscopic exam not performed based on chemical results unless requested in original order. Final    Specimen Description 10/25/2021 . CERVIX   Final    Chlamydia By Thin Prep 10/25/2021 NEGATIVE  NEGATIVE Final    Comment: CHLAMYDIA TRACHOMATIS DNA not detected by nucleic acid amplification. This test is intended for medical purposes only and is not valid for the evaluation of   suspected sexual abuse or for other forensic purposes. In certain contexts, culture may be required to meet applicable laws and regulations for   diagnosis of C. trachomatis and N. gonorrhoeae infections. Per 2014  CDC recommendations, this test does not include confirmation of positive results   by an alternative nucleic acid target.  N. gonorrhoeae DNA, Thin Prep 10/25/2021 NEGATIVE  NEGATIVE Final    Comment: NEISSERIA GONORRHOEAE DNA not detected by nucleic acid amplification. This test is intended for medical purposes only and is not valid for the evaluation of   suspected sexual abuse or for other forensic purposes. In certain contexts, culture may be required to meet applicable laws and regulations for   diagnosis of C. trachomatis and N. gonorrhoeae infections. Per 2014  CDC recommendations, this test does not include confirmation of positive results   by an alternative nucleic acid target.  Specimen Description 10/25/2021 . VAGINA   Final    Special Requests 10/25/2021 NOT REPORTED Final    Direct Exam 10/25/2021 NEGATIVE for Gardnerella vaginalis   Final    Direct Exam 10/25/2021 NEGATIVE for Candida sp. Final    Direct Exam 10/25/2021 NEGATIVE for Trichomonas vaginalis   Final    Direct Exam 10/25/2021 Method of testing is a DNA probe intended for detection and identification of Candida species, Gardnerella vaginalis, and Trichomonas vaginalis nucleic acid in vaginal fluid specimens from patients with symptoms of vaginitis/vaginosis. Final    Cytology Report 10/25/2021    Final                    Value:INTERPRETATION    Cervical material, (ThinPrep vial, Imaging-assisted review):  Specimen Adequacy:       Satisfactory for evaluation.       - Endocervical/transformation zone component present. Descriptive Diagnosis:       Negative for intraepithelial lesion or malignancy. Cytotechnologist:   AMY Alfaro(ASCP)  **Electronically Signed Out**  /10/29/2021            Source:  1: Cervical material, (ThinPrep vial, Imaging-assisted review)    Clinical History  Pregnant  High Risk HPV DNA testing is requested if the diagnosis is ASC-US  LMP:  8/16/2021  GYNECOLOGIC CYTOLOGY REPORT    Patient Name: Maxx Haque: [id number]  Path Number: [id number]  [facility] [address] [place], [address]  [phone]  Fax: [phone]     ]    G1  - Virginie day  Bill under Dr. Louie Murdock       T-Dap Vaccine (27-36 weeks): awaiting    The patient was instructed on fetal kick counts and was given a kick sheet to complete every 8 hours. She was instructed that the baby should move at a minimum of ten times within one hour after a meal. The patient was instructed to lay down on her left side twenty minutes after eating and count movements for up to one hour with a target value of ten movements.   She was instructed to notify the office if she did not make that target after two attempts or if

## 2022-02-18 ENCOUNTER — HOSPITAL ENCOUNTER (OUTPATIENT)
Age: 27
Setting detail: SPECIMEN
Discharge: HOME | End: 2022-02-18

## 2022-02-18 DIAGNOSIS — Z34.92 NORMAL PREGNANCY IN SECOND TRIMESTER: ICD-10-CM

## 2022-02-18 LAB
-: ABNORMAL
ABSOLUTE EOS #: 0.05 K/UL (ref 0–0.44)
ABSOLUTE IMMATURE GRANULOCYTE: 0.05 K/UL (ref 0–0.3)
ABSOLUTE LYMPH #: 1.25 K/UL (ref 1.1–3.7)
ABSOLUTE MONO #: 0.46 K/UL (ref 0.1–1.2)
AMORPHOUS: ABNORMAL
BACTERIA: ABNORMAL
BASOPHILS # BLD: 0 % (ref 0–2)
BASOPHILS ABSOLUTE: 0.03 K/UL (ref 0–0.2)
BILIRUBIN URINE: NEGATIVE
CASTS UA: ABNORMAL /LPF (ref 0–8)
COLOR: YELLOW
CRYSTALS, UA: ABNORMAL /HPF
DIFFERENTIAL TYPE: ABNORMAL
EOSINOPHILS RELATIVE PERCENT: 1 % (ref 1–4)
EPITHELIAL CELLS UA: ABNORMAL /HPF (ref 0–5)
GLUCOSE ADMINISTRATION: NORMAL
GLUCOSE TOLERANCE SCREEN 50G: 134 MG/DL (ref 70–135)
GLUCOSE URINE: NEGATIVE
HCT VFR BLD CALC: 37.4 % (ref 36.3–47.1)
HEMOGLOBIN: 12.4 G/DL (ref 11.9–15.1)
IMMATURE GRANULOCYTES: 1 %
KETONES, URINE: ABNORMAL
LEUKOCYTE ESTERASE, URINE: NEGATIVE
LYMPHOCYTES # BLD: 15 % (ref 24–43)
MCH RBC QN AUTO: 31.8 PG (ref 25.2–33.5)
MCHC RBC AUTO-ENTMCNC: 33.2 G/DL (ref 28.4–34.8)
MCV RBC AUTO: 95.9 FL (ref 82.6–102.9)
MONOCYTES # BLD: 6 % (ref 3–12)
MUCUS: ABNORMAL
NITRITE, URINE: NEGATIVE
NRBC AUTOMATED: 0 PER 100 WBC
OTHER OBSERVATIONS UA: ABNORMAL
PDW BLD-RTO: 13.2 % (ref 11.8–14.4)
PH UA: 5.5 (ref 5–8)
PLATELET # BLD: 255 K/UL (ref 138–453)
PLATELET ESTIMATE: ABNORMAL
PMV BLD AUTO: 11.3 FL (ref 8.1–13.5)
PROTEIN UA: NEGATIVE
RBC # BLD: 3.9 M/UL (ref 3.95–5.11)
RBC # BLD: ABNORMAL 10*6/UL
RBC UA: ABNORMAL /HPF (ref 0–4)
RENAL EPITHELIAL, UA: ABNORMAL /HPF
SEG NEUTROPHILS: 77 % (ref 36–65)
SEGMENTED NEUTROPHILS ABSOLUTE COUNT: 6.37 K/UL (ref 1.5–8.1)
SPECIFIC GRAVITY UA: 1.02 (ref 1–1.03)
TRICHOMONAS: ABNORMAL
TURBIDITY: CLEAR
URINE HGB: NEGATIVE
UROBILINOGEN, URINE: NORMAL
WBC # BLD: 8.2 K/UL (ref 3.5–11.3)
WBC # BLD: ABNORMAL 10*3/UL
WBC UA: ABNORMAL /HPF (ref 0–5)
YEAST: ABNORMAL

## 2022-03-03 ENCOUNTER — ROUTINE PRENATAL (OUTPATIENT)
Dept: PERINATAL CARE | Age: 27
End: 2022-03-03
Payer: COMMERCIAL

## 2022-03-03 VITALS
WEIGHT: 202 LBS | RESPIRATION RATE: 16 BRPM | SYSTOLIC BLOOD PRESSURE: 130 MMHG | TEMPERATURE: 97.1 F | HEART RATE: 82 BPM | BODY MASS INDEX: 32.47 KG/M2 | HEIGHT: 66 IN | DIASTOLIC BLOOD PRESSURE: 74 MMHG

## 2022-03-03 DIAGNOSIS — Z36.4 ANTENATAL SCREENING FOR FETAL GROWTH RETARDATION USING ULTRASONICS: ICD-10-CM

## 2022-03-03 DIAGNOSIS — O99.810 ABNORMAL MATERNAL GLUCOSE TOLERANCE, ANTEPARTUM: ICD-10-CM

## 2022-03-03 DIAGNOSIS — Z3A.28 28 WEEKS GESTATION OF PREGNANCY: ICD-10-CM

## 2022-03-03 DIAGNOSIS — O44.40 LOW IMPLANTATION OF PLACENTA WITHOUT HEMORRHAGE: ICD-10-CM

## 2022-03-03 DIAGNOSIS — O99.213 OBESITY AFFECTING PREGNANCY IN THIRD TRIMESTER: Primary | ICD-10-CM

## 2022-03-03 DIAGNOSIS — Z13.89 ENCOUNTER FOR ROUTINE SCREENING FOR MALFORMATION USING ULTRASONICS: ICD-10-CM

## 2022-03-03 DIAGNOSIS — Z03.72 SUSPECTED PLACENTAL PROBLEM NOT FOUND: ICD-10-CM

## 2022-03-03 LAB
ABDOMINAL CIRCUMFERENCE: NORMAL
BIPARIETAL DIAMETER: NORMAL
ESTIMATED FETAL WEIGHT: NORMAL
FEMORAL DIAMETER: NORMAL
HC/AC: NORMAL
HEAD CIRCUMFERENCE: NORMAL

## 2022-03-03 PROCEDURE — 99999 PR OFFICE/OUTPT VISIT,PROCEDURE ONLY: CPT | Performed by: OBSTETRICS & GYNECOLOGY

## 2022-03-03 PROCEDURE — 76817 TRANSVAGINAL US OBSTETRIC: CPT | Performed by: OBSTETRICS & GYNECOLOGY

## 2022-03-03 PROCEDURE — 76816 OB US FOLLOW-UP PER FETUS: CPT | Performed by: OBSTETRICS & GYNECOLOGY

## 2022-03-03 PROCEDURE — 76819 FETAL BIOPHYS PROFIL W/O NST: CPT | Performed by: OBSTETRICS & GYNECOLOGY

## 2022-03-09 ENCOUNTER — ROUTINE PRENATAL (OUTPATIENT)
Dept: OBGYN CLINIC | Age: 27
End: 2022-03-09

## 2022-03-09 VITALS — BODY MASS INDEX: 33.73 KG/M2 | WEIGHT: 209 LBS

## 2022-03-09 DIAGNOSIS — Z34.92 NORMAL PREGNANCY IN SECOND TRIMESTER: Primary | ICD-10-CM

## 2022-03-09 DIAGNOSIS — Z3A.29 29 WEEKS GESTATION OF PREGNANCY: ICD-10-CM

## 2022-03-09 PROCEDURE — 0502F SUBSEQUENT PRENATAL CARE: CPT | Performed by: NURSE PRACTITIONER

## 2022-03-09 NOTE — PROGRESS NOTES
Presents for OB visit  Gestation 29w2d  Estimated Date of Delivery: 22    G1  - Riccardo Torre normal  Bill under Dr. Joni Chaudhry  1hr gtt ordered- 80 normal                    OB History    Para Term  AB Living   1             SAB IAB Ectopic Molar Multiple Live Births                    # Outcome Date GA Lbr Yared/2nd Weight Sex Delivery Anes PTL Lv   1 Current                     Allergies   Allergen Reactions    Latex      Current Outpatient Medications   Medication Sig Dispense Refill    Prenatal Vit-Fe Fumarate-FA (PRENATAL PO) Take by mouth       No current facility-administered medications for this visit. No past medical history on file. No past surgical history on file. Headaches: no  Swelling: no  Bleeding: no  Discharge: no   Dysuria: no  Nausea: no  Heartburn: no  Epigastric pain: no  Contractions: no  Leakage of fluid: no  + fetal movement       Return 3 weeks    Labs as indicated n/a    PTL signs reviewed, if indicated  Kick Count Instructions given if indicated: The patient was instructed on fetal kick counts and was given a kick sheet to complete every 8 hours. This is to begin at 28 weeks gestation. She was instructed that the baby should move at a minimum of ten times within one hour after a meal. The patient was instructed to lay down on her left side twenty minutes after eating and count movements for up to one hour with a target value of ten movements. She was instructed to notify the office if she did not make that target after two attempts or if after any attempt there was less than four movements. After hour numbers reviewed , along with labor signs if indicated. Contractions/cramping between 5-7 minutes and persisting even with attempts of increased water and laying on side. Fluid leakage, bleeding  NST information given no  The patient was counseled on Labor & Delivery.    Route of delivery and counseling on vaginal, operative vaginal, and  sections were completed with the risks of each to both the patient as well as her baby. The possibility of a blood transfusion was discussed as well. The patient was not opposed to receiving a transfusion if needed. The patient was counseled on types of analgesia during labor and is considering either Regional or IV medication the risks, benefits and alternatives were discussed. The patient was counseled on the mandatory call ahead policy. She has been instructed to call the office at anytime prior to going into the hospital so the on-call physician may direct her to the appropriate facility for care. Exceptions were reviewed including but not limited to: Decreased fetal movement, vaginal Bleeding or hemorrhage, trauma, readily expectant delivery, or any instance where she feels 911 should be utilized. Of the 20 minute duration appointment visit, Gabe Trent CNP spent at least 50% of the face-to-face time in counseling, explanation of diagnosis, planning of further management, and answering all questions.

## 2022-03-09 NOTE — PATIENT INSTRUCTIONS

## 2022-03-31 ENCOUNTER — ROUTINE PRENATAL (OUTPATIENT)
Dept: OBGYN CLINIC | Age: 27
End: 2022-03-31
Payer: COMMERCIAL

## 2022-03-31 VITALS — SYSTOLIC BLOOD PRESSURE: 126 MMHG | DIASTOLIC BLOOD PRESSURE: 74 MMHG | WEIGHT: 209 LBS | BODY MASS INDEX: 33.73 KG/M2

## 2022-03-31 DIAGNOSIS — Z3A.32 32 WEEKS GESTATION OF PREGNANCY: ICD-10-CM

## 2022-03-31 DIAGNOSIS — Z34.92 NORMAL PREGNANCY IN SECOND TRIMESTER: Primary | ICD-10-CM

## 2022-03-31 DIAGNOSIS — O36.8130 DECREASED FETAL MOVEMENTS IN THIRD TRIMESTER, SINGLE OR UNSPECIFIED FETUS: ICD-10-CM

## 2022-03-31 PROCEDURE — 0502F SUBSEQUENT PRENATAL CARE: CPT | Performed by: NURSE PRACTITIONER

## 2022-03-31 PROCEDURE — 90715 TDAP VACCINE 7 YRS/> IM: CPT | Performed by: NURSE PRACTITIONER

## 2022-03-31 PROCEDURE — 90471 IMMUNIZATION ADMIN: CPT | Performed by: NURSE PRACTITIONER

## 2022-03-31 PROCEDURE — 59025 FETAL NON-STRESS TEST: CPT | Performed by: NURSE PRACTITIONER

## 2022-03-31 NOTE — PROGRESS NOTES
Presents for OB visit  Gestation 32w3d  Estimated Date of Delivery: 22    G1  - Zafar Stallings  FTS normal  Bill under Dr. Radhika Oliva- given information will let us know  1hr gtt ordered- 80 normal   Knows gender- girl               OB History    Para Term  AB Living   1             SAB IAB Ectopic Molar Multiple Live Births                    # Outcome Date GA Lbr Yared/2nd Weight Sex Delivery Anes PTL Lv   1 Current                     Allergies   Allergen Reactions    Latex      Current Outpatient Medications   Medication Sig Dispense Refill    Prenatal Vit-Fe Fumarate-FA (PRENATAL PO) Take by mouth       No current facility-administered medications for this visit. No past medical history on file. No past surgical history on file. Headaches: no  Swelling: no  Bleeding: no  Discharge: no   Dysuria: no  Nausea: no  Heartburn: no  Epigastric pain: no  Contractions: no  Leakage of fluid: no    + fetal movement but she states does not feel regular kick counts or ten times after meals. She states she believes related to her anterior placenta. Ordered NST:  REACTIVE NST  CATEGORY 1 TRACING  Moderate Variability  Baseline of 150 bpm  SEE SCANNED DOCUMENT          Return 1 WEEK NST mateus d/t inability to feel regular fetal movement    Labs as indicated n/a    PTL signs reviewed, if indicated  Kick Count Instructions given if indicated: The patient was instructed on fetal kick counts and was given a kick sheet to complete every 8 hours. This is to begin at 28 weeks gestation. She was instructed that the baby should move at a minimum of ten times within one hour after a meal. The patient was instructed to lay down on her left side twenty minutes after eating and count movements for up to one hour with a target value of ten movements.    She was instructed to notify the office if she did not make that target after two attempts or if after any attempt there was less than four movements. After hour numbers reviewed , along with labor signs if indicated. Contractions/cramping between 5-7 minutes and persisting even with attempts of increased water and laying on side. Fluid leakage, bleeding  NST information given yes  The patient was counseled on Labor & Delivery. Route of delivery and counseling on vaginal, operative vaginal, and  sections were completed with the risks of each to both the patient as well as her baby. The possibility of a blood transfusion was discussed as well. The patient was not opposed to receiving a transfusion if needed. The patient was counseled on types of analgesia during labor and is considering either Regional or IV medication the risks, benefits and alternatives were discussed. The patient was counseled on the mandatory call ahead policy. She has been instructed to call the office at anytime prior to going into the hospital so the on-call physician may direct her to the appropriate facility for care. Exceptions were reviewed including but not limited to: Decreased fetal movement, vaginal Bleeding or hemorrhage, trauma, readily expectant delivery, or any instance where she feels 911 should be utilized. Of the 20 minute duration appointment visit, Sussy Ricks CNP spent at least 50% of the face-to-face time in counseling, explanation of diagnosis, planning of further management, and answering all questions.

## 2022-03-31 NOTE — PATIENT INSTRUCTIONS

## 2022-04-07 ENCOUNTER — ROUTINE PRENATAL (OUTPATIENT)
Dept: OBGYN CLINIC | Age: 27
End: 2022-04-07
Payer: COMMERCIAL

## 2022-04-07 VITALS — SYSTOLIC BLOOD PRESSURE: 118 MMHG | BODY MASS INDEX: 34.38 KG/M2 | WEIGHT: 213 LBS | DIASTOLIC BLOOD PRESSURE: 72 MMHG

## 2022-04-07 DIAGNOSIS — O36.8130 DECREASED FETAL MOVEMENTS IN THIRD TRIMESTER, SINGLE OR UNSPECIFIED FETUS: ICD-10-CM

## 2022-04-07 DIAGNOSIS — Z34.92 NORMAL PREGNANCY IN SECOND TRIMESTER: Primary | ICD-10-CM

## 2022-04-07 DIAGNOSIS — Z3A.33 33 WEEKS GESTATION OF PREGNANCY: ICD-10-CM

## 2022-04-07 PROCEDURE — 59025 FETAL NON-STRESS TEST: CPT | Performed by: NURSE PRACTITIONER

## 2022-04-07 PROCEDURE — 0502F SUBSEQUENT PRENATAL CARE: CPT | Performed by: NURSE PRACTITIONER

## 2022-04-07 NOTE — PATIENT INSTRUCTIONS
After Hours Number: You can call the office [phone]  or [phone]  Call if you have:  1. Bleeding like a period  2. Cramps or contractions greater than 2 hours  3. If you are leaking fluid  4. If you've a fever greater than 100°  5. If you feel as if baby is not moving  6. If you have continuous vomiting over 3-4 hours   Counting Your Baby's Kicks: Care Instructions  Your Care Instructions    Counting your baby's kicks is one way your doctor can tell that your baby is healthy. Most women--especially in a first pregnancy--feel their baby move for the first time between 16 and 22 weeks. The movement may feel like flutters rather than kicks. Your baby may move more at certain times of the day. When you are active, you may notice less kicking than when you are resting. At your prenatal visits, your doctor will ask whether the baby is active. In your last trimester, your doctor may ask you to count the number of times you feel your baby move. Follow-up care is a key part of your treatment and safety. Be sure to make and go to all appointments, and call your doctor if you are having problems. It's also a good idea to know your test results and keep a list of the medicines you take. How do you count fetal kicks? · A common method of checking your baby's movement is to count the number of kicks or moves you feel in 1 hour. Ten movements (such as kicks, flutters, or rolls) in 1 hour are normal. Some doctors suggest that you count in the morning until you get to 10 movements. Then you can quit for that day and start again the next day. · Pick your baby's most active time of day to count. This may be any time from morning to evening. · If you do not feel 10 movements in an hour, your baby may be sleeping. Wait for the next hour and count again. When should you call for help?   Call your doctor now or seek immediate medical care if:    · You noticed that your baby has stopped moving or is moving much less than normal.    Watch closely for changes in your health, and be sure to contact your doctor if you have any problems. Where can you learn more? Go to https://chpepiceweb.Inaika. org and sign in to your [employer] account. Enter Z624 in the motionID technologies box to learn more about \"Counting Your Baby's Kicks: Care Instructions. \"     If you do not have an account, please click on the \"Sign Up Now\" link. Current as of: September 5, 2018  Content Version: 12.0  © 3481-3167 [employer]. Care instructions adapted under license by [facility] ([facility]). If you have questions about a medical condition or this instruction, always ask your healthcare professional. [address] any warranty or liability for your use of this information. Preeclampsia: Care Instructions  Overview    Preeclampsia occurs when a woman's blood pressure rises during pregnancy. Often with preeclampsia, you also have swelling in your legs, hands, and face. A test may show too much protein in your urine. Preeclampsia is also called toxemia. If preeclampsia is severe and not treated, it can lead to seizures (eclampsia) and damage to your liver or kidneys. Preeclampsia can prevent your baby from getting enough food and oxygen. This can cause a low birth weight or other problems. Your doctor will watch you closely to prevent these problems. He or she also may recommend that you reduce your activity. If your preeclampsia is a danger to your health or the health of your baby, your doctor may need to deliver your baby early. While preeclampsia is a concern, most women with preeclampsia have healthy babies. After a woman gives birth, preeclampsia usually goes away on its own. But symptoms may last a few weeks or more and can get worse after delivery. Rarely, symptoms of preeclampsia don't show up until days or even weeks after childbirth. Follow-up care is a key part of your treatment and safety.  Be sure to make and go to all appointments, and call your doctor if you are having problems. It's also a good idea to know your test results and keep a list of the medicines you take. How can you care for yourself at home? · Take and record your blood pressure at home if your doctor tells you to. ? Learn the importance of the two measures of blood pressure (such as 120 over 80, or 120/80). The first number is the systolic pressure, which is the force of blood on the artery walls as the heart pumps. The second number is the diastolic pressure, which is the force of blood on the artery walls between heartbeats, when the heart is at rest. You have a choice of monitors to use. ? Manual monitor: You pump up the cuff and use a stethoscope to listen for your pulse. ? Electronic monitor: The cuff inflates, and a gauge shows your pulse rate. ? To take your blood pressure:  ? Ask your doctor to check your blood pressure monitor to be sure that it is accurate and that the cuff fits you. Also ask your doctor to watch you to make sure that you are using it right. ? You should not eat, use tobacco products, or use medicine known to raise blood pressure (such as some nasal decongestant sprays) before you take your blood pressure. ? Avoid taking your blood pressure if you have just exercised. Also avoid taking it if you are nervous or upset. Rest at least 15 minutes before you take your blood pressure. · If your doctor advises, check the protein levels in your urine. Your doctor or nurse will show you how to do this. · Take your medicines exactly as prescribed. Call your doctor if you think you are having a problem with your medicine. · Do not smoke. Quitting smoking will help improve your baby's growth and health. If you need help quitting, talk to your doctor about stop-smoking programs and medicines. These can increase your chances of quitting for good.   · Eat a balanced and healthy diet that has lots of fruits and vegetables. · If your doctor advised bed rest, be sure to stay off your feet and rest as much as possible. ? Keep a phone, phone book, notepad, and pen near the bed where you can easily reach them. ? Gently stretch your legs every hour to maintain good blood flow. ? Have another family member pack snacks and lunch food in a cooler close to your bed. ? Use this time for activities that you usually cannot find time for, such as reading, craft projects, or letter writing. · You can keep track of your baby's health by noting the length of time it takes to count 10 movements (such as kicks, flutters, or rolls). Feeling 10 movements in less than 1 hour is considered normal. Track your baby's movements once each day. Bring this record with you to each prenatal visit. When should you call for help? Call 911 anytime you think you may need emergency care. For example, call if:    · You passed out (lost consciousness). · You have a seizure. Call your doctor now or seek immediate medical care if:    · You have symptoms of preeclampsia, such as:  ? Sudden swelling of your face, hands, or feet. ? New vision problems (such as dimness or blurring). ? A severe headache. · Your blood pressure is higher than it should be, or it rises suddenly. · You have new nausea or vomiting. · You think that you are in labor. · You have pain in your belly or pelvis. Watch closely for changes in your health, and be sure to contact your doctor if:    · You gain weight rapidly. Where can you learn more? Go to https://NetzoptikervitalyMobibao Technology.Transerv. org and sign in to your [employer] account. Enter T753 in the motify box to learn more about \"Preeclampsia: Care Instructions. \"     If you do not have an account, please click on the \"Sign Up Now\" link. Current as of: September 5, 2018  Content Version: 12.0  © 1043-7142 Healthwise, Incorporated. Care instructions adapted under license by [facility] ([facility]).  If you have questions about a medical condition or this instruction, always ask your healthcare professional. [address] any warranty or liability for your use of this information.  Labor: Care Instructions  Your Care Instructions     labor is the start of labor between 21 and 36 weeks of pregnancy. A full-term pregnancy lasts 37 to 42 weeks. In labor, the uterus contracts to open the cervix. This is the first stage of childbirth.  labor can be caused by a problem with the baby, the mother, or both. Often the cause is not known. In some cases, doctors use medicines to try to delay labor until 29 or more weeks of pregnancy. By this time, a baby has grown enough so that problems are not likely. In some cases--such as with a serious infection--it is healthier for the baby to be born early. Your treatment will depend on how far along you are in your pregnancy and on your health and your baby's health. Follow-up care is a key part of your treatment and safety. Be sure to make and go to all appointments, and call your doctor if you are having problems. It's also a good idea to know your test results and keep a list of the medicines you take. How can you care for yourself at home? · If your doctor prescribed medicines, take them exactly as directed. Call your doctor if you think you are having a problem with your medicine. · Rest until your doctor advises you about activity. He or she will tell you if you should stay in bed most of the time. You may need to arrange for  if you have young children. · Do not have sexual intercourse unless your doctor says it is safe. · Use pads, not tampons, if you have vaginal bleeding. · Make sure to drink plenty of fluids. Dehydration can lead to contractions. If you have kidney, heart, or liver disease and have to limit fluids, talk with your doctor before you increase the amount of fluids you drink.   · Do not smoke or instructions adapted under license by [facility] ([facility]). If you have questions about a medical condition or this instruction, always ask your healthcare professional. [address] any warranty or liability for your use of this information.

## 2022-04-07 NOTE — PROGRESS NOTES
S:  Here for NST for fetal surveillance secondary to decreased fetal movement, has anterior placenta  She  has no other complaints today  Reports fetal movement    G1  - Marvel  FTS normal  Bill under Dr. Alma Deluna   tdap given 3/31  1hr gtt ordered- 80 normal   Knows gender- girl  NSTs weekly d/t decreased fetal movement- anterior placenta       O:  Vitals:    04/07/22 1537   BP: 118/72   Weight: 213 lb (96.6 kg)        Baseline:  140  Variability:  Moderate  Accelerations:  Present  Decelerations:  Possible rare variable, vs artifact will do BPP  Fetal Movement:  Perceived by patient during NST  Contractions:  absent    BPP8/8    A/P:    1. 33 weeks gestation of pregnancy    - [place] FETAL NON-STRESS TEST    2. Decreased fetal movements in third trimester, single or unspecified fetus    - [place] FETAL NON-STRESS TEST    3.  Normal pregnancy in THIRD trimester        Reactive NST    Keep scheduled fetal surveillance appointment  Continue Fetal Kick Counts

## 2022-04-14 ENCOUNTER — ROUTINE PRENATAL (OUTPATIENT)
Dept: OBGYN CLINIC | Age: 27
End: 2022-04-14
Payer: COMMERCIAL

## 2022-04-14 VITALS — DIASTOLIC BLOOD PRESSURE: 72 MMHG | WEIGHT: 213 LBS | SYSTOLIC BLOOD PRESSURE: 120 MMHG | BODY MASS INDEX: 34.38 KG/M2

## 2022-04-14 DIAGNOSIS — Z3A.34 34 WEEKS GESTATION OF PREGNANCY: Primary | ICD-10-CM

## 2022-04-14 DIAGNOSIS — Z34.93 NORMAL PREGNANCY, THIRD TRIMESTER: ICD-10-CM

## 2022-04-14 DIAGNOSIS — O36.8130 DECREASED FETAL MOVEMENTS IN THIRD TRIMESTER, SINGLE OR UNSPECIFIED FETUS: ICD-10-CM

## 2022-04-14 PROCEDURE — 0502F SUBSEQUENT PRENATAL CARE: CPT | Performed by: NURSE PRACTITIONER

## 2022-04-14 PROCEDURE — 59025 FETAL NON-STRESS TEST: CPT | Performed by: NURSE PRACTITIONER

## 2022-04-14 NOTE — PATIENT INSTRUCTIONS
After Hours Number: You can call the office [phone]  or [phone]  Call if you have:  1. Bleeding like a period  2. Cramps or contractions greater than 2 hours  3. If you are leaking fluid  4. If you've a fever greater than 100°  5. If you feel as if baby is not moving  6. If you have continuous vomiting over 3-4 hours   Counting Your Baby's Kicks: Care Instructions  Your Care Instructions    Counting your baby's kicks is one way your doctor can tell that your baby is healthy. Most women--especially in a first pregnancy--feel their baby move for the first time between 16 and 22 weeks. The movement may feel like flutters rather than kicks. Your baby may move more at certain times of the day. When you are active, you may notice less kicking than when you are resting. At your prenatal visits, your doctor will ask whether the baby is active. In your last trimester, your doctor may ask you to count the number of times you feel your baby move. Follow-up care is a key part of your treatment and safety. Be sure to make and go to all appointments, and call your doctor if you are having problems. It's also a good idea to know your test results and keep a list of the medicines you take. How do you count fetal kicks? · A common method of checking your baby's movement is to count the number of kicks or moves you feel in 1 hour. Ten movements (such as kicks, flutters, or rolls) in 1 hour are normal. Some doctors suggest that you count in the morning until you get to 10 movements. Then you can quit for that day and start again the next day. · Pick your baby's most active time of day to count. This may be any time from morning to evening. · If you do not feel 10 movements in an hour, your baby may be sleeping. Wait for the next hour and count again. When should you call for help?   Call your doctor now or seek immediate medical care if:    · You noticed that your baby has stopped moving or is moving much less than normal.    Watch closely for changes in your health, and be sure to contact your doctor if you have any problems. Where can you learn more? Go to https://chpepiceweb.Waggl. org and sign in to your [employer] account. Enter L798 in the "[employer] Inc." box to learn more about \"Counting Your Baby's Kicks: Care Instructions. \"     If you do not have an account, please click on the \"Sign Up Now\" link. Current as of: September 5, 2018  Content Version: 12.0  © 0740-5328 [employer]. Care instructions adapted under license by [facility] ([facility]). If you have questions about a medical condition or this instruction, always ask your healthcare professional. [address] any warranty or liability for your use of this information. Preeclampsia: Care Instructions  Overview    Preeclampsia occurs when a woman's blood pressure rises during pregnancy. Often with preeclampsia, you also have swelling in your legs, hands, and face. A test may show too much protein in your urine. Preeclampsia is also called toxemia. If preeclampsia is severe and not treated, it can lead to seizures (eclampsia) and damage to your liver or kidneys. Preeclampsia can prevent your baby from getting enough food and oxygen. This can cause a low birth weight or other problems. Your doctor will watch you closely to prevent these problems. He or she also may recommend that you reduce your activity. If your preeclampsia is a danger to your health or the health of your baby, your doctor may need to deliver your baby early. While preeclampsia is a concern, most women with preeclampsia have healthy babies. After a woman gives birth, preeclampsia usually goes away on its own. But symptoms may last a few weeks or more and can get worse after delivery. Rarely, symptoms of preeclampsia don't show up until days or even weeks after childbirth. Follow-up care is a key part of your treatment and safety.  Be sure to make and go to all appointments, and call your doctor if you are having problems. It's also a good idea to know your test results and keep a list of the medicines you take. How can you care for yourself at home? · Take and record your blood pressure at home if your doctor tells you to. ? Learn the importance of the two measures of blood pressure (such as 120 over 80, or 120/80). The first number is the systolic pressure, which is the force of blood on the artery walls as the heart pumps. The second number is the diastolic pressure, which is the force of blood on the artery walls between heartbeats, when the heart is at rest. You have a choice of monitors to use. ? Manual monitor: You pump up the cuff and use a stethoscope to listen for your pulse. ? Electronic monitor: The cuff inflates, and a gauge shows your pulse rate. ? To take your blood pressure:  ? Ask your doctor to check your blood pressure monitor to be sure that it is accurate and that the cuff fits you. Also ask your doctor to watch you to make sure that you are using it right. ? You should not eat, use tobacco products, or use medicine known to raise blood pressure (such as some nasal decongestant sprays) before you take your blood pressure. ? Avoid taking your blood pressure if you have just exercised. Also avoid taking it if you are nervous or upset. Rest at least 15 minutes before you take your blood pressure. · If your doctor advises, check the protein levels in your urine. Your doctor or nurse will show you how to do this. · Take your medicines exactly as prescribed. Call your doctor if you think you are having a problem with your medicine. · Do not smoke. Quitting smoking will help improve your baby's growth and health. If you need help quitting, talk to your doctor about stop-smoking programs and medicines. These can increase your chances of quitting for good.   · Eat a balanced and healthy diet that has lots of fruits and you have questions about a medical condition or this instruction, always ask your healthcare professional. [address] any warranty or liability for your use of this information.  Labor: Care Instructions  Your Care Instructions     labor is the start of labor between 21 and 36 weeks of pregnancy. A full-term pregnancy lasts 37 to 42 weeks. In labor, the uterus contracts to open the cervix. This is the first stage of childbirth.  labor can be caused by a problem with the baby, the mother, or both. Often the cause is not known. In some cases, doctors use medicines to try to delay labor until 29 or more weeks of pregnancy. By this time, a baby has grown enough so that problems are not likely. In some cases--such as with a serious infection--it is healthier for the baby to be born early. Your treatment will depend on how far along you are in your pregnancy and on your health and your baby's health. Follow-up care is a key part of your treatment and safety. Be sure to make and go to all appointments, and call your doctor if you are having problems. It's also a good idea to know your test results and keep a list of the medicines you take. How can you care for yourself at home? · If your doctor prescribed medicines, take them exactly as directed. Call your doctor if you think you are having a problem with your medicine. · Rest until your doctor advises you about activity. He or she will tell you if you should stay in bed most of the time. You may need to arrange for  if you have young children. · Do not have sexual intercourse unless your doctor says it is safe. · Use pads, not tampons, if you have vaginal bleeding. · Make sure to drink plenty of fluids. Dehydration can lead to contractions. If you have kidney, heart, or liver disease and have to limit fluids, talk with your doctor before you increase the amount of fluids you drink.   · Do not smoke or allow others to smoke around you. If you need help quitting, talk to your doctor about stop-smoking programs and medicines. These can increase your chances of quitting for good. When should you call for help? Call 911 anytime you think you may need emergency care. For example, call if:    · You passed out (lost consciousness). · You have severe vaginal bleeding. · You have severe pain in your belly or pelvis. · You have had fluid gushing or leaking from your vagina and you know or think the umbilical cord is bulging into your vagina. If this happens, immediately get down on your knees so your rear end (buttocks) is higher than your head. This will decrease the pressure on the cord until help arrives. Call your doctor now or seek immediate medical care if:    · You have signs of preeclampsia, such as:  ? Sudden swelling of your face, hands, or feet. ? New vision problems (such as dimness or blurring). ? A severe headache. · You have any vaginal bleeding. · You have belly pain or cramping. · You have a fever. · You have had regular contractions (with or without pain) for an hour. This means that you have 6 or more within 1 hour after you change your position and drink fluids. · You have a sudden release of fluid from the vagina. · You have low back pain or pelvic pressure that does not go away. · You notice that your baby has stopped moving or is moving much less than normal.    Watch closely for changes in your health, and be sure to contact your doctor if you have any problems. Where can you learn more? Go to https://go2 mediavitalyLogicSource.Teal Orbit. org and sign in to your [employer] account. Enter Q400 in the Biomode - Biomolecular Determination box to learn more about \" Labor: Care Instructions. \"     If you do not have an account, please click on the \"Sign Up Now\" link. Current as of: 2018  Content Version: 12.0  © 1564-7763 Healthwise, [facility].  Care instructions adapted under license by [facility] ([facility]). If you have questions about a medical condition or this instruction, always ask your healthcare professional. [address] any warranty or liability for your use of this information.

## 2022-04-14 NOTE — PROGRESS NOTES
S:  Here for NST for fetal surveillance secondary to decreased fetal movement/anterior placenta  She  has no complaints today  Reports fetal movement    G1  - Marvel  FTS normal  Bill under Dr. Sean Jones   tdap given 3/31  1hr gtt ordered- 80 normal   Knows gender- girl  NSTs weekly d/t decreased fetal movement- anterior placenta             O:  Vitals:    04/14/22 1558   BP: 120/72   Weight: 213 lb (96.6 kg)        Baseline:  140  Variability:  Moderate  Accelerations:  Present  Decelerations:  Absent  Fetal Movement:  Perceived by patient during NST      A/P:    1. Decreased fetal movements in third trimester, single or unspecified fetus    - [place] FETAL NON-STRESS TEST    2. 34 weeks gestation of pregnancy    - [place] FETAL NON-STRESS TEST    3.  Normal pregnancy, third trimester        Reactive NST    Keep scheduled fetal surveillance appointment  Continue Fetal Kick Counts

## 2022-04-21 ENCOUNTER — ROUTINE PRENATAL (OUTPATIENT)
Dept: OBGYN CLINIC | Age: 27
End: 2022-04-21
Payer: COMMERCIAL

## 2022-04-21 VITALS — BODY MASS INDEX: 34.7 KG/M2 | WEIGHT: 215 LBS | SYSTOLIC BLOOD PRESSURE: 112 MMHG | DIASTOLIC BLOOD PRESSURE: 72 MMHG

## 2022-04-21 DIAGNOSIS — Z3A.35 35 WEEKS GESTATION OF PREGNANCY: ICD-10-CM

## 2022-04-21 DIAGNOSIS — Z34.93 NORMAL PREGNANCY, THIRD TRIMESTER: Primary | ICD-10-CM

## 2022-04-21 DIAGNOSIS — O36.8130 DECREASED FETAL MOVEMENTS IN THIRD TRIMESTER, SINGLE OR UNSPECIFIED FETUS: ICD-10-CM

## 2022-04-21 PROCEDURE — 0502F SUBSEQUENT PRENATAL CARE: CPT | Performed by: NURSE PRACTITIONER

## 2022-04-21 PROCEDURE — 59025 FETAL NON-STRESS TEST: CPT | Performed by: NURSE PRACTITIONER

## 2022-04-21 NOTE — PROGRESS NOTES
S:  Here for NST for fetal surveillance secondary to decreased fetal movement with anterior placenta  She has no complaints today  Reports fetal movement    G1  - Marvel  FTS normal  Bill under Dr. Kristin Stevenson   tdap given 3/31  1hr gtt ordered- 80 normal   Knows gender- girl  NSTs weekly d/t decreased fetal movement- anterior placenta             O:  Vitals:    04/21/22 1548   BP: 112/72   Weight: 215 lb (97.5 kg)        Baseline:  140  Variability:  Moderate  Accelerations:  Present  Decelerations:  Absent  Fetal Movement:  Perceived by patient during NST  Contractions:  no    A/P:    1. Normal pregnancy, third trimester    - [place] FETAL NON-STRESS TEST    2.  Decreased fetal movements in third trimester, single or unspecified fetus    - [place] FETAL NON-STRESS TEST    3. 35 weeks gestation of pregnancy        Reactive NST    Keep scheduled fetal surveillance appointment  Continue Fetal Kick Counts

## 2022-04-21 NOTE — PATIENT INSTRUCTIONS

## 2022-04-28 ENCOUNTER — HOSPITAL ENCOUNTER (OUTPATIENT)
Age: 27
Setting detail: SPECIMEN
Discharge: HOME | End: 2022-04-28

## 2022-04-28 ENCOUNTER — ROUTINE PRENATAL (OUTPATIENT)
Dept: OBGYN CLINIC | Age: 27
End: 2022-04-28
Payer: COMMERCIAL

## 2022-04-28 VITALS — WEIGHT: 217 LBS | SYSTOLIC BLOOD PRESSURE: 112 MMHG | DIASTOLIC BLOOD PRESSURE: 60 MMHG | BODY MASS INDEX: 35.02 KG/M2

## 2022-04-28 DIAGNOSIS — Z34.93 NORMAL PREGNANCY, THIRD TRIMESTER: Primary | ICD-10-CM

## 2022-04-28 DIAGNOSIS — O36.8130 DECREASED FETAL MOVEMENTS IN THIRD TRIMESTER, SINGLE OR UNSPECIFIED FETUS: ICD-10-CM

## 2022-04-28 DIAGNOSIS — Z3A.36 36 WEEKS GESTATION OF PREGNANCY: ICD-10-CM

## 2022-04-28 PROCEDURE — 59025 FETAL NON-STRESS TEST: CPT | Performed by: NURSE PRACTITIONER

## 2022-04-28 PROCEDURE — 0502F SUBSEQUENT PRENATAL CARE: CPT | Performed by: NURSE PRACTITIONER

## 2022-04-28 NOTE — PROGRESS NOTES
S:  Here for NST for fetal surveillance secondary to decreased fetal movements/anterior placenta  She  has no complaints today  Reports fetal movement    G1  - Marvel  FTS normal  Bill under Dr. Yareli Jerry given 3/31  1hr gtt ordered- 80 normal   Knows gender- girl  NSTs weekly d/t decreased fetal movement- anterior placenta    Headaches: no  Swelling: no  Bleeding: no  Discharge: no   Dysuria: no  Nausea: no  Heartburn: no  Epigastric pain: no  Contractions: no  Leakage of fluid: no             O:  Vitals:    04/28/22 1545   BP: 112/60   Weight: 217 lb (98.4 kg)        Baseline:  140  Variability:  Moderate  Accelerations:  Present  Decelerations:  Absent  Fetal Movement:  Perceived by patient during NST  Contractions:  absent    A/P:    1. Normal pregnancy, third trimester    - Culture, Strep B Screen, Vaginal/Rectal; Future    2.  Decreased fetal movements in third trimester, single or unspecified fetus    - OK FETAL NON-STRESS TEST    3. 36 weeks gestation of pregnancy    - Culture, Strep B Screen, Vaginal/Rectal; Future  - OK FETAL NON-STRESS TEST      Reactive NST    Keep scheduled fetal surveillance appointment  Continue Fetal Kick Counts

## 2022-04-28 NOTE — PATIENT INSTRUCTIONS

## 2022-04-29 DIAGNOSIS — Z3A.36 36 WEEKS GESTATION OF PREGNANCY: ICD-10-CM

## 2022-04-29 DIAGNOSIS — Z34.93 NORMAL PREGNANCY, THIRD TRIMESTER: ICD-10-CM

## 2022-05-02 LAB
CULTURE: NORMAL
SPECIMEN DESCRIPTION: NORMAL

## 2022-05-05 ENCOUNTER — ROUTINE PRENATAL (OUTPATIENT)
Dept: OBGYN CLINIC | Age: 27
End: 2022-05-05
Payer: COMMERCIAL

## 2022-05-05 VITALS
DIASTOLIC BLOOD PRESSURE: 64 MMHG | WEIGHT: 219.44 LBS | BODY MASS INDEX: 35.42 KG/M2 | SYSTOLIC BLOOD PRESSURE: 118 MMHG

## 2022-05-05 DIAGNOSIS — O36.8130 DECREASED FETAL MOVEMENTS IN THIRD TRIMESTER, SINGLE OR UNSPECIFIED FETUS: ICD-10-CM

## 2022-05-05 DIAGNOSIS — Z34.93 NORMAL PREGNANCY, THIRD TRIMESTER: Primary | ICD-10-CM

## 2022-05-05 DIAGNOSIS — Z3A.37 37 WEEKS GESTATION OF PREGNANCY: ICD-10-CM

## 2022-05-05 PROCEDURE — 59025 FETAL NON-STRESS TEST: CPT | Performed by: ADVANCED PRACTICE MIDWIFE

## 2022-05-05 NOTE — PROGRESS NOTES
INDICATIONS: decreased FM    NST:    MONITORING TECHNIQUE: cEFM  FHR BASELINE:135  FHR VARIABILITY:moderate   ACCELERATIONS;present  DECELERATIONS:absent   INTERPRETATION: REASSURING AND REACTIVE  2 OR MORE ACCELS OF 15 BPM LASTING 15 SEC IN 20 MIN PERIOD  IMPRESSION: Fetal status is reassuring by non-stress test. (Category 1)  FETAL MOVEMENT : PT PERCEIVED FETAL ACTIVITY DURING NST. RECOMMENDATIONS: Continue fetal surveillance as previously outlined. Kick counts reviewed. Encourage patient to continue fetal kick counts. NST monitoring strip reviewed by ordering physician, signed, and filed per office policy.   Discharged home:

## 2022-05-05 NOTE — PATIENT INSTRUCTIONS
Patient Education        Counting Your Baby's Kicks: Care Instructions  Overview     Counting your baby's kicks is one way your doctor can tell that your baby is healthy. Most women--especially in a first pregnancy--feel their baby move for the first time between 16 and 22 weeks. The movement may feel like flutters rather than kicks. Your baby may move more at certain times of the day. When you are active, you may notice less kicking than when you are resting. At yourprenatal visits, your doctor will ask whether the baby is active. In your last trimester, your doctor may ask you to count the number of timesyou feel your baby move. Follow-up care is a key part of your treatment and safety. Be sure to make and go to all appointments, and call your doctor if you are having problems. It's also a good idea to know your test results and keep alist of the medicines you take. How do you count fetal kicks?  A common method of checking your baby's movement is to note the length of time it takes to count ten movements (such as kicks, flutters, or rolls).  Pick your baby's most active time of day to count. This may be any time from morning to evening.  If you don't feel 10 movements in an hour, have something to eat or drink and count for another hour. If you don't feel at least 10 movements in the 2-hour period, call your doctor. When should you call for help? Call your doctor now or seek immediate medical care if:     You noticed that your baby has stopped moving or is moving much less than normal.   Watch closely for changes in your health, and be sure to contact your doctor ifyou have any problems. Where can you learn more? Go to [email or web address] and sign in to your Ge[employer] account. Enter X643 in the StartSampling box to learn more about \"Counting Your Baby's Kicks: Care Instructions. \"     If you do not have an account, please click on the \"Sign Up Now\" link.   Current as of: June 16, 2021               Content Version: 13.2  © 3472-5425 Healthwise, Incorporated. Care instructions adapted under license by [facility] ([facility]). If you have questions about a medical condition or this instruction, always ask your healthcare professional. [address] any warranty or liability for your use of this information.

## 2022-05-12 ENCOUNTER — ROUTINE PRENATAL (OUTPATIENT)
Dept: OBGYN CLINIC | Age: 27
End: 2022-05-12
Payer: COMMERCIAL

## 2022-05-12 VITALS
WEIGHT: 220.13 LBS | SYSTOLIC BLOOD PRESSURE: 118 MMHG | BODY MASS INDEX: 35.53 KG/M2 | DIASTOLIC BLOOD PRESSURE: 70 MMHG

## 2022-05-12 DIAGNOSIS — O36.8130 DECREASED FETAL MOVEMENTS IN THIRD TRIMESTER, SINGLE OR UNSPECIFIED FETUS: Primary | ICD-10-CM

## 2022-05-12 DIAGNOSIS — Z34.93 NORMAL PREGNANCY, THIRD TRIMESTER: ICD-10-CM

## 2022-05-12 DIAGNOSIS — Z3A.38 38 WEEKS GESTATION OF PREGNANCY: ICD-10-CM

## 2022-05-12 PROCEDURE — 59025 FETAL NON-STRESS TEST: CPT | Performed by: NURSE PRACTITIONER

## 2022-05-12 PROCEDURE — 0502F SUBSEQUENT PRENATAL CARE: CPT | Performed by: NURSE PRACTITIONER

## 2022-05-12 NOTE — PROGRESS NOTES
S:  Here for NST for fetal surveillance secondary to decreased fetal movements  She has no complaints today  Reports fetal movement    G1  - Marvel  FTS normal  Bill under Dr. Robin Fierro   tdap given 3/31  1hr gtt ordered- 80 normal   Knows gender- girl  NSTs weekly d/t decreased fetal movement- anterior placenta     Headaches: no  Swelling: no  Bleeding: no  Discharge: no   Dysuria: no  Nausea: no  Heartburn: no  Epigastric pain: no  Contractions: no  Leakage of fluid: no            O:  Vitals:    05/12/22 1540   BP: 118/70   Weight: 220 lb 2 oz (99.8 kg)        Baseline:  135   Variability:  Moderate  Accelerations:  Present  Decelerations:  Absent  Fetal Movement:  Perceived by patient during NST  Contractions:  no    A/P:    1. Decreased fetal movements in third trimester, single or unspecified fetus    - AR FETAL NON-STRESS TEST    2.  Normal pregnancy, third trimester      3. 38 weeks gestation of pregnancy        Reactive NST    Keep scheduled fetal surveillance appointment  Continue Fetal Kick Counts

## 2022-05-12 NOTE — PATIENT INSTRUCTIONS

## 2022-05-18 ENCOUNTER — ROUTINE PRENATAL (OUTPATIENT)
Dept: OBGYN CLINIC | Age: 27
End: 2022-05-18
Payer: COMMERCIAL

## 2022-05-18 VITALS — WEIGHT: 226.5 LBS | BODY MASS INDEX: 36.56 KG/M2 | DIASTOLIC BLOOD PRESSURE: 74 MMHG | SYSTOLIC BLOOD PRESSURE: 116 MMHG

## 2022-05-18 DIAGNOSIS — O36.8130 DECREASED FETAL MOVEMENTS IN THIRD TRIMESTER, SINGLE OR UNSPECIFIED FETUS: Primary | ICD-10-CM

## 2022-05-18 DIAGNOSIS — Z3A.39 39 WEEKS GESTATION OF PREGNANCY: ICD-10-CM

## 2022-05-18 DIAGNOSIS — Z34.93 NORMAL PREGNANCY, THIRD TRIMESTER: ICD-10-CM

## 2022-05-18 PROCEDURE — 0502F SUBSEQUENT PRENATAL CARE: CPT | Performed by: OBSTETRICS & GYNECOLOGY

## 2022-05-18 PROCEDURE — 59025 FETAL NON-STRESS TEST: CPT | Performed by: OBSTETRICS & GYNECOLOGY

## 2022-05-18 NOTE — PROGRESS NOTES
Diana Lion is a 32 y.o. female 44w2d        OB History    Para Term  AB Living   1             SAB IAB Ectopic Molar Multiple Live Births                    # Outcome Date GA Lbr Yared/2nd Weight Sex Delivery Anes PTL Lv   1 Current                Vitals  BP: 116/74  Weight: 226 lb 8 oz (102.7 kg)  Patient Position: Sitting      The patient was seen and evaluated. There was positive fetal movements - decreased per patient, but normal for her during her pregnancy with anterior placentation. No contractions or leakage of fluid. Signs and symptoms of labor were reviewed. The S/S of Pre-Eclampsia were reviewed with the patient in detail. She is to report any of these if they occur. She currently denies any of these. The patient was instructed on fetal kick counts and was given a kick sheet to complete every 8 hours. She was instructed that the baby should move at a minimum of ten times within one hour after a meal. The patient was instructed to lay down on her left side twenty minutes after eating and count movements for up to one hour with a target value of ten movements. She was instructed to notify the office if she did not make that target after two attempts or if after any attempt there was less than four movements. The patient reports that the targets have been made No    G1  - Rose Mary Book normal  Bill under Dr. Michela Lawson given 3/31  1hr gtt ordered- 80 normal   Knows gender- girl  NSTs weekly d/t decreased fetal movement- anterior placenta      T-Dap Vaccine Completed (27-36 weeks): Yes    Allergies: Allergies as of 2022 - Fully Reviewed 2022   Allergen Reaction Noted    Latex  10/25/2021         Group Beta Strep collection was completed. Yes  GBS Results:   Hospital Outpatient Visit on 2022   Component Date Value Ref Range Status    Specimen Description 2022 . VAGINA   Final    Culture 2022 NEGATIVE FOR GROUP B STREPTOCOCCI   Final ]        Cervical Exam was:   Pt declined      The patient was counseled on the mandatory call ahead policy. She has been instructed to call the office at anytime prior to going into the hospital so the on-call physician may direct her to the appropriate facility for care. Exceptions were reviewed including but not limited to: Decreased fetal movement, vaginal Bleeding or hemorrhage, trauma, readily expectant delivery, or any instance where she feels 911 should be utilized. The patient was counseled on Labor & Delivery. Route of delivery and counseling on vaginal, operative vaginal, and  sections were completed with the risks of each to both the patient as well as her baby. The possibility of a blood transfusion was discussed as well. The patient was not opposed to receiving a transfusion if needed. The patient was counseled on types of analgesia during labor and is considering either Regional or IV medication the risks, benefits and alternatives were discussed.  Testing:  Yes, decreased fetal movement    REACTIVE NST  CATEGORY 1 TRACING  Moderate Variability  Baseline of 140 bpm  SEE SCANNED DOCUMENT  RTO 2-5 DAYS FOR A FOLLOW UP APPOINTMENT WITH  TESTING. Assessment:  1. [place] Hamm is a 32 y.o. female  2.   3. 39w2d    There are no problems to display for this patient. Diagnosis Orders   1. Decreased fetal movements in third trimester, single or unspecified fetus  MT FETAL NON-STRESS TEST   2. Normal pregnancy, third trimester     3. 39 weeks gestation of pregnancy             Plan:  The patient will return to the office for her next visit in 1 weeks. See antepartum flow sheet. Counseled on s/s of preeclampsia. Counseled on s/s of labor. Fetal movements discussed in detail. Patient was seen with total face to face time of 20 minutes. More than 50% of this visit was on counseling and education regarding her    Diagnosis Orders   1.  Decreased fetal movements in third trimester, single or unspecified fetus  NH FETAL NON-STRESS TEST   2. Normal pregnancy, third trimester     3. 39 weeks gestation of pregnancy      and her options. She was also counseled on her preventative health maintenance recommendations and follow-up.

## 2022-05-22 ENCOUNTER — HOSPITAL ENCOUNTER (INPATIENT)
Age: 27
LOS: 5 days | Discharge: HOME | End: 2022-05-27
Attending: OBSTETRICS & GYNECOLOGY | Admitting: OBSTETRICS & GYNECOLOGY
Payer: COMMERCIAL

## 2022-05-22 ENCOUNTER — APPOINTMENT (OUTPATIENT)
Dept: LABOR AND DELIVERY | Age: 27
End: 2022-05-22
Payer: COMMERCIAL

## 2022-05-22 DIAGNOSIS — Z98.891 H/O: CESAREAN SECTION: Primary | ICD-10-CM

## 2022-05-22 PROBLEM — Z3A.39 39 WEEKS GESTATION OF PREGNANCY: Status: ACTIVE | Noted: 2022-05-22

## 2022-05-22 LAB
ABO/RH: NORMAL
ABSOLUTE EOS #: 0.08 K/UL (ref 0–0.44)
ABSOLUTE IMMATURE GRANULOCYTE: 0.04 K/UL (ref 0–0.3)
ABSOLUTE LYMPH #: 1.63 K/UL (ref 1.1–3.7)
ABSOLUTE MONO #: 0.66 K/UL (ref 0.1–1.2)
ALBUMIN SERPL-MCNC: 3.7 G/DL (ref 3.5–5.2)
ALBUMIN/GLOBULIN RATIO: 1.4 (ref 1–2.5)
ALP BLD-CCNC: 129 U/L (ref 35–104)
ALT SERPL-CCNC: 11 U/L (ref 5–33)
AMPHETAMINE SCREEN URINE: NEGATIVE
ANION GAP SERPL CALCULATED.3IONS-SCNC: 10 MMOL/L (ref 9–17)
ANTIBODY SCREEN: NEGATIVE
ARM BAND NUMBER: NORMAL
AST SERPL-CCNC: 14 U/L
BARBITURATE SCREEN URINE: NEGATIVE
BASOPHILS # BLD: 0 % (ref 0–2)
BASOPHILS ABSOLUTE: <0.03 K/UL (ref 0–0.2)
BENZODIAZEPINE SCREEN, URINE: NEGATIVE
BILIRUB SERPL-MCNC: 0.17 MG/DL (ref 0.3–1.2)
BUN BLDV-MCNC: 4 MG/DL (ref 6–20)
CALCIUM SERPL-MCNC: 9.4 MG/DL (ref 8.6–10.4)
CANNABINOID SCREEN URINE: NEGATIVE
CHLORIDE BLD-SCNC: 101 MMOL/L (ref 98–107)
CO2: 24 MMOL/L (ref 20–31)
COCAINE METABOLITE, URINE: NEGATIVE
CREAT SERPL-MCNC: 0.4 MG/DL (ref 0.5–0.9)
CREATININE URINE: 53.4 MG/DL (ref 28–217)
EOSINOPHILS RELATIVE PERCENT: 1 % (ref 1–4)
EXPIRATION DATE: NORMAL
GFR AFRICAN AMERICAN: >60 ML/MIN
GFR NON-AFRICAN AMERICAN: >60 ML/MIN
GFR SERPL CREATININE-BSD FRML MDRD: ABNORMAL ML/MIN/{1.73_M2}
GLUCOSE BLD-MCNC: 92 MG/DL (ref 70–99)
HCT VFR BLD CALC: 34.8 % (ref 36.3–47.1)
HEMOGLOBIN: 12 G/DL (ref 11.9–15.1)
IMMATURE GRANULOCYTES: 0 %
LYMPHOCYTES # BLD: 18 % (ref 24–43)
MCH RBC QN AUTO: 31.5 PG (ref 25.2–33.5)
MCHC RBC AUTO-ENTMCNC: 34.5 G/DL (ref 28.4–34.8)
MCV RBC AUTO: 91.3 FL (ref 82.6–102.9)
METHADONE SCREEN, URINE: NEGATIVE
MONOCYTES # BLD: 7 % (ref 3–12)
NRBC AUTOMATED: 0 PER 100 WBC
OPIATES, URINE: NEGATIVE
OXYCODONE SCREEN URINE: NEGATIVE
PDW BLD-RTO: 13.4 % (ref 11.8–14.4)
PHENCYCLIDINE, URINE: NEGATIVE
PLATELET # BLD: 237 K/UL (ref 138–453)
PMV BLD AUTO: 11.6 FL (ref 8.1–13.5)
POTASSIUM SERPL-SCNC: 3.6 MMOL/L (ref 3.7–5.3)
RBC # BLD: 3.81 M/UL (ref 3.95–5.11)
SEG NEUTROPHILS: 74 % (ref 36–65)
SEGMENTED NEUTROPHILS ABSOLUTE COUNT: 6.62 K/UL (ref 1.5–8.1)
SODIUM BLD-SCNC: 135 MMOL/L (ref 135–144)
T. PALLIDUM, IGG: NONREACTIVE
TEST INFORMATION: NORMAL
TOTAL PROTEIN, URINE: 9 MG/DL
TOTAL PROTEIN: 6.4 G/DL (ref 6.4–8.3)
URINE TOTAL PROTEIN CREATININE RATIO: 0.17 (ref 0–0.2)
WBC # BLD: 9.1 K/UL (ref 3.5–11.3)

## 2022-05-22 PROCEDURE — 82570 ASSAY OF URINE CREATININE: CPT

## 2022-05-22 PROCEDURE — 86900 BLOOD TYPING SEROLOGIC ABO: CPT

## 2022-05-22 PROCEDURE — 86850 RBC ANTIBODY SCREEN: CPT

## 2022-05-22 PROCEDURE — 1220000000 HC SEMI PRIVATE OB R&B

## 2022-05-22 PROCEDURE — 3E0P7VZ INTRODUCTION OF HORMONE INTO FEMALE REPRODUCTIVE, VIA NATURAL OR ARTIFICIAL OPENING: ICD-10-PCS | Performed by: SPECIALIST

## 2022-05-22 PROCEDURE — 2580000003 HC RX 258: Performed by: STUDENT IN AN ORGANIZED HEALTH CARE EDUCATION/TRAINING PROGRAM

## 2022-05-22 PROCEDURE — 86780 TREPONEMA PALLIDUM: CPT

## 2022-05-22 PROCEDURE — 84156 ASSAY OF PROTEIN URINE: CPT

## 2022-05-22 PROCEDURE — 80307 DRUG TEST PRSMV CHEM ANLYZR: CPT

## 2022-05-22 PROCEDURE — 86901 BLOOD TYPING SEROLOGIC RH(D): CPT

## 2022-05-22 PROCEDURE — 6370000000 HC RX 637 (ALT 250 FOR IP): Performed by: STUDENT IN AN ORGANIZED HEALTH CARE EDUCATION/TRAINING PROGRAM

## 2022-05-22 PROCEDURE — 85025 COMPLETE CBC W/AUTO DIFF WBC: CPT

## 2022-05-22 PROCEDURE — 2500000003 HC RX 250 WO HCPCS

## 2022-05-22 PROCEDURE — 80053 COMPREHEN METABOLIC PANEL: CPT

## 2022-05-22 RX ORDER — SODIUM CHLORIDE 0.9 % (FLUSH) 0.9 %
5-40 SYRINGE (ML) INJECTION PRN
Status: DISCONTINUED | OUTPATIENT
Start: 2022-05-22 | End: 2022-05-24 | Stop reason: HOSPADM

## 2022-05-22 RX ORDER — LIDOCAINE HYDROCHLORIDE 10 MG/ML
30 INJECTION, SOLUTION EPIDURAL; INFILTRATION; INTRACAUDAL; PERINEURAL PRN
Status: DISCONTINUED | OUTPATIENT
Start: 2022-05-22 | End: 2022-05-24 | Stop reason: HOSPADM

## 2022-05-22 RX ORDER — SODIUM CHLORIDE 0.9 % (FLUSH) 0.9 %
5-40 SYRINGE (ML) INJECTION EVERY 12 HOURS SCHEDULED
Status: DISCONTINUED | OUTPATIENT
Start: 2022-05-22 | End: 2022-05-24 | Stop reason: HOSPADM

## 2022-05-22 RX ORDER — SODIUM CHLORIDE, SODIUM LACTATE, POTASSIUM CHLORIDE, AND CALCIUM CHLORIDE .6; .31; .03; .02 G/100ML; G/100ML; G/100ML; G/100ML
500 INJECTION, SOLUTION INTRAVENOUS PRN
Status: DISCONTINUED | OUTPATIENT
Start: 2022-05-22 | End: 2022-05-24 | Stop reason: HOSPADM

## 2022-05-22 RX ORDER — SODIUM CHLORIDE, SODIUM LACTATE, POTASSIUM CHLORIDE, CALCIUM CHLORIDE 600; 310; 30; 20 MG/100ML; MG/100ML; MG/100ML; MG/100ML
INJECTION, SOLUTION INTRAVENOUS CONTINUOUS
Status: DISCONTINUED | OUTPATIENT
Start: 2022-05-22 | End: 2022-05-24

## 2022-05-22 RX ORDER — ACETAMINOPHEN 500 MG
1000 TABLET ORAL EVERY 6 HOURS PRN
Status: DISCONTINUED | OUTPATIENT
Start: 2022-05-22 | End: 2022-05-24 | Stop reason: HOSPADM

## 2022-05-22 RX ORDER — ONDANSETRON 2 MG/ML
4 INJECTION INTRAMUSCULAR; INTRAVENOUS EVERY 6 HOURS PRN
Status: DISCONTINUED | OUTPATIENT
Start: 2022-05-22 | End: 2022-05-24 | Stop reason: SDUPTHER

## 2022-05-22 RX ORDER — DIPHENHYDRAMINE HCL 25 MG
25 TABLET ORAL EVERY 4 HOURS PRN
Status: DISCONTINUED | OUTPATIENT
Start: 2022-05-22 | End: 2022-05-24 | Stop reason: HOSPADM

## 2022-05-22 RX ORDER — DOCUSATE SODIUM 100 MG/1
100 CAPSULE, LIQUID FILLED ORAL 2 TIMES DAILY
Status: DISCONTINUED | OUTPATIENT
Start: 2022-05-22 | End: 2022-05-24 | Stop reason: HOSPADM

## 2022-05-22 RX ORDER — SODIUM CHLORIDE 9 MG/ML
25 INJECTION, SOLUTION INTRAVENOUS PRN
Status: DISCONTINUED | OUTPATIENT
Start: 2022-05-22 | End: 2022-05-24 | Stop reason: HOSPADM

## 2022-05-22 RX ORDER — SODIUM CHLORIDE, SODIUM LACTATE, POTASSIUM CHLORIDE, AND CALCIUM CHLORIDE .6; .31; .03; .02 G/100ML; G/100ML; G/100ML; G/100ML
1000 INJECTION, SOLUTION INTRAVENOUS PRN
Status: DISCONTINUED | OUTPATIENT
Start: 2022-05-22 | End: 2022-05-24 | Stop reason: HOSPADM

## 2022-05-22 RX ADMIN — Medication 50 MCG: at 22:57

## 2022-05-22 RX ADMIN — SODIUM CHLORIDE, POTASSIUM CHLORIDE, SODIUM LACTATE AND CALCIUM CHLORIDE: 600; 310; 30; 20 INJECTION, SOLUTION INTRAVENOUS at 21:32

## 2022-05-23 ENCOUNTER — ANESTHESIA (OUTPATIENT)
Dept: LABOR AND DELIVERY | Age: 27
End: 2022-05-23
Payer: COMMERCIAL

## 2022-05-23 ENCOUNTER — ANESTHESIA EVENT (OUTPATIENT)
Dept: LABOR AND DELIVERY | Age: 27
End: 2022-05-23
Payer: COMMERCIAL

## 2022-05-23 PROCEDURE — 2580000003 HC RX 258: Performed by: STUDENT IN AN ORGANIZED HEALTH CARE EDUCATION/TRAINING PROGRAM

## 2022-05-23 PROCEDURE — 6370000000 HC RX 637 (ALT 250 FOR IP): Performed by: STUDENT IN AN ORGANIZED HEALTH CARE EDUCATION/TRAINING PROGRAM

## 2022-05-23 PROCEDURE — 6360000002 HC RX W HCPCS: Performed by: STUDENT IN AN ORGANIZED HEALTH CARE EDUCATION/TRAINING PROGRAM

## 2022-05-23 PROCEDURE — 10907ZC DRAINAGE OF AMNIOTIC FLUID, THERAPEUTIC FROM PRODUCTS OF CONCEPTION, VIA NATURAL OR ARTIFICIAL OPENING: ICD-10-PCS | Performed by: SPECIALIST

## 2022-05-23 PROCEDURE — 1220000000 HC SEMI PRIVATE OB R&B

## 2022-05-23 PROCEDURE — 2500000003 HC RX 250 WO HCPCS: Performed by: NURSE ANESTHETIST, CERTIFIED REGISTERED

## 2022-05-23 PROCEDURE — 6360000002 HC RX W HCPCS: Performed by: NURSE ANESTHETIST, CERTIFIED REGISTERED

## 2022-05-23 PROCEDURE — 3E033VJ INTRODUCTION OF OTHER HORMONE INTO PERIPHERAL VEIN, PERCUTANEOUS APPROACH: ICD-10-PCS | Performed by: SPECIALIST

## 2022-05-23 PROCEDURE — 6360000002 HC RX W HCPCS: Performed by: ANESTHESIOLOGY

## 2022-05-23 PROCEDURE — 3700000025 EPIDURAL BLOCK: Performed by: ANESTHESIOLOGY

## 2022-05-23 RX ORDER — NALOXONE HYDROCHLORIDE 0.4 MG/ML
INJECTION, SOLUTION INTRAMUSCULAR; INTRAVENOUS; SUBCUTANEOUS PRN
Status: DISCONTINUED | OUTPATIENT
Start: 2022-05-23 | End: 2022-05-24 | Stop reason: SDUPTHER

## 2022-05-23 RX ORDER — ONDANSETRON 2 MG/ML
4 INJECTION INTRAMUSCULAR; INTRAVENOUS EVERY 6 HOURS PRN
Status: DISCONTINUED | OUTPATIENT
Start: 2022-05-23 | End: 2022-05-24 | Stop reason: SDUPTHER

## 2022-05-23 RX ORDER — LIDOCAINE HYDROCHLORIDE AND EPINEPHRINE 15; 5 MG/ML; UG/ML
INJECTION, SOLUTION EPIDURAL PRN
Status: DISCONTINUED | OUTPATIENT
Start: 2022-05-23 | End: 2022-05-24 | Stop reason: SDUPTHER

## 2022-05-23 RX ORDER — ROPIVACAINE HYDROCHLORIDE 2 MG/ML
INJECTION, SOLUTION EPIDURAL; INFILTRATION; PERINEURAL PRN
Status: DISCONTINUED | OUTPATIENT
Start: 2022-05-23 | End: 2022-05-24 | Stop reason: SDUPTHER

## 2022-05-23 RX ORDER — MORPHINE SULFATE 2 MG/ML
2 INJECTION, SOLUTION INTRAMUSCULAR; INTRAVENOUS ONCE
Status: COMPLETED | OUTPATIENT
Start: 2022-05-23 | End: 2022-05-23

## 2022-05-23 RX ORDER — MORPHINE SULFATE 10 MG/ML
8 INJECTION, SOLUTION INTRAMUSCULAR; INTRAVENOUS ONCE
Status: COMPLETED | OUTPATIENT
Start: 2022-05-23 | End: 2022-05-23

## 2022-05-23 RX ORDER — LIDOCAINE HYDROCHLORIDE 10 MG/ML
INJECTION, SOLUTION EPIDURAL; INFILTRATION; INTRACAUDAL; PERINEURAL PRN
Status: DISCONTINUED | OUTPATIENT
Start: 2022-05-23 | End: 2022-05-24 | Stop reason: SDUPTHER

## 2022-05-23 RX ORDER — PROCHLORPERAZINE EDISYLATE 5 MG/ML
10 INJECTION INTRAMUSCULAR; INTRAVENOUS ONCE
Status: COMPLETED | OUTPATIENT
Start: 2022-05-23 | End: 2022-05-23

## 2022-05-23 RX ORDER — ROPIVACAINE HYDROCHLORIDE 2 MG/ML
INJECTION, SOLUTION EPIDURAL; INFILTRATION; PERINEURAL
Status: COMPLETED
Start: 2022-05-23 | End: 2022-05-24

## 2022-05-23 RX ADMIN — SODIUM CHLORIDE, POTASSIUM CHLORIDE, SODIUM LACTATE AND CALCIUM CHLORIDE: 600; 310; 30; 20 INJECTION, SOLUTION INTRAVENOUS at 05:36

## 2022-05-23 RX ADMIN — MORPHINE SULFATE 2 MG: 2 INJECTION, SOLUTION INTRAMUSCULAR; INTRAVENOUS at 10:01

## 2022-05-23 RX ADMIN — ROPIVACAINE HYDROCHLORIDE 8 ML: 2 INJECTION, SOLUTION EPIDURAL; INFILTRATION at 22:05

## 2022-05-23 RX ADMIN — Medication 1 MILLI-UNITS/MIN: at 12:15

## 2022-05-23 RX ADMIN — ROPIVACAINE HYDROCHLORIDE 10 ML/HR: 2 INJECTION, SOLUTION EPIDURAL; INFILTRATION at 22:05

## 2022-05-23 RX ADMIN — PROCHLORPERAZINE EDISYLATE 10 MG: 5 INJECTION INTRAMUSCULAR; INTRAVENOUS at 10:00

## 2022-05-23 RX ADMIN — Medication 50 MCG: at 08:15

## 2022-05-23 RX ADMIN — SODIUM CHLORIDE, POTASSIUM CHLORIDE, SODIUM LACTATE AND CALCIUM CHLORIDE: 600; 310; 30; 20 INJECTION, SOLUTION INTRAVENOUS at 13:25

## 2022-05-23 RX ADMIN — SODIUM CHLORIDE, POTASSIUM CHLORIDE, SODIUM LACTATE AND CALCIUM CHLORIDE: 600; 310; 30; 20 INJECTION, SOLUTION INTRAVENOUS at 20:50

## 2022-05-23 RX ADMIN — MORPHINE SULFATE 8 MG: 10 INJECTION, SOLUTION INTRAMUSCULAR; INTRAVENOUS at 10:01

## 2022-05-23 RX ADMIN — Medication 50 MCG: at 03:22

## 2022-05-23 RX ADMIN — LIDOCAINE HYDROCHLORIDE,EPINEPHRINE BITARTRATE 5 ML: 15; .005 INJECTION, SOLUTION EPIDURAL; INFILTRATION; INTRACAUDAL; PERINEURAL at 21:59

## 2022-05-23 RX ADMIN — LIDOCAINE HYDROCHLORIDE 2 ML: 10 INJECTION, SOLUTION EPIDURAL; INFILTRATION; INTRACAUDAL; PERINEURAL at 21:56

## 2022-05-23 RX ADMIN — SODIUM CHLORIDE, POTASSIUM CHLORIDE, SODIUM LACTATE AND CALCIUM CHLORIDE 1000 ML: 600; 310; 30; 20 INJECTION, SOLUTION INTRAVENOUS at 21:35

## 2022-05-23 NOTE — DISCHARGE SUMMARY
Obstetric Discharge Summary  [facility]    Patient Name: Jeferson Mcnamara  Patient : 1995  Primary Care Physician: No primary care provider on file. Admit Date: 2022    Principal Diagnosis: IUP at 39w6d, admitted for risk reducing induction of labor     Her pregnancy has been complicated by:   Patient Active Problem List   Diagnosis    gHTN (G1)    40 weeks gestation of pregnancy    PLTCS 22 F Apg 8/9 Wt 6#14       Infection Present?: No  Hospital Acquired: No    Surgical Operations & Procedures:  Analgesia: epidural  Delivery Type:  Delivery: See Labor and Delivery Summary   Laceration(s): NA    Consultations: NICU and Anesthesia    Pertinent Findings & Procedures:   Jeferson Mcnamara is a 32 y.o. female  at 37w11d admitted for reducing induction of labor; her blood pressure was elevated on admission. Preeclampsia labs were obtained and were within normal limits. P/C 0.17. Received Cytotec 50 PV x1, 50 POx2, Meng transcervical balloon, pitocin, amniotomy yielding scant clear fluid, IUPC. During induction, she met criteria for gestational HTN. She met criteria for a failed induction with 24 hours of no cervical change, on Pitocin, ruptured, with an IUPC. Therefore patient is now requesting to move forward with a . She delivered by primary low transverse  a Live Born infant on 22. Information for the patient's :  Regina Youssef [[id number]]   female   Birth Weight: 6 lb 14.2 oz (3.125 kg)       Apgars: 8 at 1 minute and 9 at 5 minutes. Postpartum course:     Pt received keflex/flagyl x48h for wound prophylaxis. POD#0: Hbg 10.6.     Course of patient: uncomplicated    Discharge to: Home    Readmission planned: no     Recommendations on Discharge:     Medications:      Medication List      START taking these medications    ibuprofen 600 MG tablet  Commonly known as: ADVIL;MOTRIN  Take 1 tablet by mouth every 6 hours as needed for Pain     ondansetron 4 MG disintegrating tablet  Commonly known as: Zofran ODT  Take 1 tablet by mouth every 8 hours as needed for Nausea or Vomiting     oxyCODONE 5 MG immediate release tablet  Commonly known as: Roxicodone  Take 1 tablet by mouth every 6 hours as needed for Pain for up to 5 days. Intended supply: 5 days. Take lowest dose possible to manage pain     senna-docusate 8.6-50 MG per tablet  Commonly known as: Senokot S  Take 1 tablet by mouth daily        CONTINUE taking these medications    PRENATAL PO           Where to Get Your Medications      These medications were sent to [facility] [address] [facility], [address] [facility]  [address], [address] [place] [address]    Phone: [phone]   · ibuprofen 600 MG tablet  · ondansetron 4 MG disintegrating tablet  · oxyCODONE 5 MG immediate release tablet  · senna-docusate 8.6-50 MG per tablet         Activity: pelvic rest x 6 weeks, no driving on narcotics, no lifting greater than 15 lbs  Diet: regular diet  Follow up: 1 week for BP check Silver dressing removal    Condition on discharge: stable    Discharge date: 5/27/22    Zane Patel DO  Ob/Gyn Resident    Comments:  Home care and follow-up care were reviewed. Pelvic rest, and birth control were reviewed. Signs and symptoms of mastitis and post partum depression were reviewed. The patient is to notify her physician if any of these occur. The patient was counseled on secondary smoke risks and the increased risk of sudden infant death syndrome and respiratory problems to her baby with exposure. She was counseled on various alternate recommendations to decrease the exposure to secondary smoke to her children.

## 2022-05-23 NOTE — FLOWSHEET NOTE
At 1000 RN and Dr. Lamine Burton in patient room for Meng balloon insertion. Morphine and compazine given prior to placement for pain control. Meng balloon inserted by Dr. Lamine Burton without difficulty. 60mL of sterile water was instilled and traction applied. Patient tolerated well.

## 2022-05-23 NOTE — PROGRESS NOTES
[facility] Labor Progress Note    Corina Hebert is a 32 y.o. female  at 37w0d  The patient was seen and examined. Her pain is well controlled. She reports fetal movement is present, complains of contractions, denies loss of fluid, denies vaginal bleeding.        Vital Signs:  Vitals:    22 0323   BP: 130/70   Pulse: 70   Resp: 16   Temp: 98.6 °F (37 °C)   SpO2:          FHT: 135, moderate variability, accelerations present, decelerations absent  Contractions: none      Cervical Exam: 1/thick/-3  Pitocin: @ 0 mu/min    Membranes: Intact  Scalp Electrode in place: absent  Intrauterine Pressure Catheter in Place: absent    Interventions: none    Assessment/Plan:  Corina Hebert is a 32 y.o. female  at 37w0d IUP   - GBS negative / Rh positive / R immune   - No indication for GBS prophylaxis    RR IOL   - VSS   - cEFM and TOCO   - IVF:  mL/hr   - Cytotec 50mcg PV x1, 50mcg PO x1, next dose due at 0722   - Continue expectant management    Elevated BP x1   - Does not meet criteria for hypertensive disorder   - PreE labs wnl, P/C 0.17   - Continue to monitor      Senior resident updated and in agreement with plan    DO NATALY Saleh Resident  2022, 6:01 AM

## 2022-05-23 NOTE — PROGRESS NOTES
Labor Progress Note    Jeferson Mcnamara is a 32 y.o. female  at 37w0d  The patient was seen and examined. Her pain is well controlled with morphine/compazine. She reports fetal movement is present, complains of contractions, denies loss of fluid, denies vaginal bleeding. SVE performed, head was well applied to the cervix. Amniotomy performed yielding scant fluid. Mother and baby tolerated well. Vital Signs:  Vitals:    22 1124 22 1330 22 1432 22 1500   BP: 116/70 110/72 124/77 120/78   Pulse: 89 80 84 87   Resp: 18 16 16 16   Temp: 98.1 °F (36.7 °C)      TempSrc: Oral      SpO2: 99%            FHT: Baseline 130, moderate variability, accelerations present, decelerations absent, Category 1 tracing  Contractions: irregular, every 2-4 minutes  Cervical Exam: 3 cm dilated, 50 effaced, -1 station  Chaperone for Intimate Exam: Chaperone was present for entire exam, Chaperone Name: Aiyana Tan RN  Pitocin: @ 6 mu/min    Membranes: AROM clear fluid @ 1610  Scalp Electrode in place: absent  Intrauterine Pressure Catheter in Place: absent        Assessment/Plan:  Jeferson Mcnamara is a 32 y.o. female  at 37w0d admitted for IUP   - GBS negative, No indication for GBS prophylaxis   - Rh pos, Workup for Rhogam not indicated PP   - Anterior placenta, 3 vc, normal female anatomy     Risk reducing IOL              - VSS, afebrile              - Cat 1 FHT and TOCO showing irregular contractions               - SVE 3/50%/-1              - cEFM and TOCO              - LR IVF @ 125 mL/hr              - S/p Cytotec 50mcg PV x1, 50 mcg buccal x2              - S/p Meng transcervical balloon   - S/p morphine/compazine IM x1   - AROM (clr) @ 1610   - Pitocin per protocol    Discussed with Senior Resident.     Attending updated and in agreement with plan    Rah Espinosa DO  Ob/Gyn Resident  Pager: [phone] [facility]   3/89/58336:30 PM

## 2022-05-23 NOTE — PROGRESS NOTES
Labor Progress Note    Anna Marie Armstrong is a 32 y.o. female  at 37w0d  The patient was seen and examined. Her pain is well controlled during contractions without medication but she requested medication prophylactically for balloon placement. She reports fetal movement is present, is feeling contractions, denies loss of fluid, denies vaginal bleeding. Meng transcervical balloon placed with stylette guide and inflated to 60 mL. Minimal bloody discharge noted. Mother and baby tolerated well. Vital Signs:  Vitals:    22 2052 22 2251 22 0323 22 0822   BP: (!) 144/75 119/69 130/70 128/77   Pulse: 89 69 70 91   Resp: 18 18 16 16   Temp: 98.6 °F (37 °C) 97.8 °F (36.6 °C) 98.6 °F (37 °C) 97.8 °F (36.6 °C)   TempSrc: Oral Oral Oral Oral   SpO2: 96% 97%  100%         FHT: Baseline 145, moderate variability, accelerations present, decelerations absent, Category 1 tracing  Contractions: irregular, every 2-3 minutes  Cervical Exam: 1 cm dilated, 30 effaced, -2 station  Chaperone for Intimate Exam: Chaperone was present for entire exam, Chaperone Name: Jaskaran Rothman RN  Pitocin: @ 0 mu/min    Membranes: Intact  Scalp Electrode in place: absent  Intrauterine Pressure Catheter in Place: absent        Assessment/Plan:  Anna Marie Armstrong is a 32 y.o. female  at 37w0d admitted for IUP   - GBS negative, No indication for GBS prophylaxis   - Rh pos, Workup for Rhogam not indicated PP   - Anterior placenta, 3 vc, normal female anatomy    Risk reducing IOL   - VSS, afebrile   - Cat 1 FHT and TOCO showing irregular contractions    - Membranes intact   - SVE 1/30%/-2   - cEFM and TOCO   - LR IVF @ 125 mL/hr   - S/p Cytotec 50mcg PV x1, 50 mcg buccal x2   - Meng transcervical balloon placed @ 1000    Discussed with Senior Resident.     Attending updated and in agreement with plan    Levi Haque DO  Ob/Gyn Resident  Pager: [phone] [facility]   :56 AM

## 2022-05-23 NOTE — H&P
OBSTETRICAL HISTORY [facility]    Date: 2022       Time: 9:35 PM   Patient Name: Joycelyn Nevarez     Patient : 1995  Room/Bed: 6970/7956-99    Admission Date/Time: 2022  8:04 PM      CC: reducing induction of labor     HPI: Joycelyn Nevarez is a 32 y.o.  at 37w11d who presents for reducing induction of labor. She has no complaints at this time. Patient denies vaginal bleeding, irritation, itching, hematuria, dysuria, chest pain, SOB, F/C, N/V/D, HA, RUQ pain, visual changes. The patient reports fetal movement is present, denies contractions, denies loss of fluid, denies vaginal bleeding. DATING:  LMP: Patient's last menstrual period was 2021.   Estimated Date of Delivery: 22   Based on: LMP consistent with early ultrasound, at 10 4/7 weeks GA    PREGNANCY RISK FACTORS:  Patient Active Problem List   Diagnosis    39 weeks gestation of pregnancy        Steroids Given In This Pregnancy:  no     REVIEW OF SYSTEMS:   Constitutional: negative fever, negative chills, negative weight changes   HEENT: negative visual disturbances, negative headaches, negative dizziness, negative hearing loss  Breast: Negative breast abnormalities, negative breast lumps, negative nipple discharge  Respiratory: negative dyspnea, negative cough, negative SOB  Cardiovascular: negative chest pain,  negative palpitations, negative arrhythmia, negative syncope   Gastrointestinal: negative abdominal pain, negative RUQ pain, negative N/V, negative diarrhea, negative constipation, negative bowel changes, negative heartburn   Genitourinary: negative dysuria, negative hematuria, negative urinary incontinence, negative vaginal discharge, negative vaginal bleeding or spotting  Dermatological: negative rash, negative pruritis, negative mole or other skin changes  Hematologic: negative bruising  Immunologic/Lymphatic: negative recent illness, negative recent sick contact  Musculoskeletal: negative back pain, negative myalgias, negative arthralgias  Neurological:  negative dizziness, negative migraines, negative seizures, negative weakness  Behavior/Psych: negative depression, negative anxiety, negative SI, negative HI    OBSTETRICAL HISTORY:   OB History    Para Term  AB Living   1 0 0 0 0 0   SAB IAB Ectopic Molar Multiple Live Births   0 0 0 0 0 0      # Outcome Date GA Lbr Yared/2nd Weight Sex Delivery Anes PTL Lv   1 Current                PAST MEDICAL HISTORY:   has no past medical history on file. PAST SURGICAL HISTORY:   has no past surgical history on file. ALLERGIES:  is allergic to latex. MEDICATIONS:  Prior to Admission medications    Medication Sig Start Date End Date Taking? Authorizing Provider   Prenatal Vit-Fe Fumarate-FA (PRENATAL PO) Take by mouth    Historical Provider, MD       FAMILY HISTORY:  family history is not on file. SOCIAL HISTORY:   reports that she has never smoked. She has never used smokeless tobacco. She reports previous alcohol use. She reports that she does not use drugs.     VITALS:  Vitals:    22   BP: (!) 144/75   Pulse: 89   Resp: 18   Temp: 98.6 °F (37 °C)   TempSrc: Oral   SpO2: 96%         PHYSICAL EXAM:  Fetal Heart Monitor:  Baseline Heart Rate 140, moderate variability, present accelerations, absent decelerations  Bartolo: None     General appearance:  no apparent distress, alert and cooperative  HEENT: head atraumatic, normocephalic, moist mucous membranes, trachea midline  Neurologic:  alert, oriented, normal speech, no focal findings or movement disorder noted  Lungs:  No increased work of breathing, good air exchange, clear to auscultation bilaterally, no crackles or wheezing  Heart:  regular rate and rhythm and no murmur, rubs, gallops  Abdomen:  soft, gravid, non-tender, no rebound, guarding, or rigidity, no RUQ or epigastric tenderness  Extremities:  no calf tenderness, non edematous, no varicosities, full range of motion in all four extremities  Musculoskeletal: Gross strength equal and intact throughout, no gross abnormalities, range of motion normal in hips, knees, shoulders and spine  Psychiatric: Mood appropriate, normal affect   Rectal Exam: not indicated  Pelvic Exam:   Chaperone for Intimate Exam: Chaperone was present for entire exam, Chaperone Name: Winston Kothari, RN  Sterile Vaginal Exam:  Cervix: No cervical motion tenderness   Uterus: Isgravid, Normal size, shape, consistency and non-tender   Adnexa: Non-tender, no palpable masses  Cervix: FT dilated, 0 % effaced, -3 station, mid position (out of 3 station), medium consistency, FETAL POSITION: Cephalic (confirmed by ultrasound), Membranes intact,    Bishops Score: 2     0 1 2 3   Position Posterior Intermediate Anterior -   Consistency Firm Intermediate Soft -   Effacement 0-30% 31-50% 51-80% >80%   Dilation 0cm 1-2cm 3-4cm >5cm   Fetal Station -3 -2 -1, 0 +1, +2       LIMITED BEDSIDE US:  Position: Cephalic  Placental Location: anterior  Fetal Heart Tones: Present  Fetal Movement: Present  Amniotic Fluid Index/Volume: adequate 2x2 cm fluid pocket  Estimated Fetal Weight:  6 lbs 15oz    PRENATAL LAB RESULTS:   Blood Type/Rh: O pos  Antibody Screen: negative  Hemoglobin, Hematocrit, Platelets: Hgb 40.9/PTI 37.1/Plt 276  Rubella: immune  T.  Pallidum, IgG: non-reactive  Hepatitis B Surface Antigen: non-reactive   Hepatitis C Antibody: non-reactive   HIV: non-reactive   Sickle Cell Screen: not available  Gonorrhea: negative  Chlamydia: negative  Urinalysis: negative, date: 2/18/22    1 hour Glucose Tolerance Test: 134    Group B Strep: negative RV culture on 4/28/22  Cystic Fibrosis Screen: not available  First Trimester Screen: low risk for aneuploidy  MSAFP/Multiple Markers: not available  Non-Invasive Prenatal Testing: not available  Anatomy US: anterior placenta, 3VC normal insertion, female gender, normal anatomy    ASSESSMENT & PLAN:  Megan Tubbs Patrina Soulier is a 32 y.o. female  at 37w11d IUP   - GBS negative / Rh positive / R immune   - No indication for GBS prophylaxis    Risk Reducing IOL  - Admit to labor and delivery under the service of Dr. Ammon Blanchard   - VSS    - cEFM and TOCO   - Cat 1 FHT and TOCO showing no contractions    - CBC, T&S, T.Pal ordered   - UDS ordered. R/B/A discussed with patient and patient agreeable   - Plan of Induction: Cytotec 25 PV   - SVE FT/Thich/High   - BSUS: Cephalic, 1#02    Elevated BP x1   - PreE labs ordered on admission   - Denies s/s of PreE    Low lying placenta (resolved)    BMI 36      Patient Active Problem List    Diagnosis Date Noted    39 weeks gestation of pregnancy 2022     Priority: Medium       Plan discussed with Dr. Ammon Blanchard, who is agreeable. Steroids given this admission: No    Risks, benefits, alternatives and possible complications have been discussed in detail with the patient. Admission, and post admission procedures and expectations were discussed in detail. All questions were answered.     Attending's Name: Dr. Ameya Lopes, DO  Ob/Gyn Resident  2022, 9:35 PM

## 2022-05-23 NOTE — FLOWSHEET NOTE
Meng balloon came out with traction at 1400. Dr. Luz Huerta notified. RN completed SVE with patient consent and patient is 3/50/-2.  Will continue with Pitocin per Dr. Luz Huerta

## 2022-05-23 NOTE — FLOWSHEET NOTE
Pt presents to L and D, accompanied by FOB, and her mother and father, via wheelchair. Pt here for scheduled risk reducing induction. Pt gowned and in bed, oriented to room and call light. EFM explained and applied. FHT's 145s and assuring. Dr. Denise Reid notified of admission.

## 2022-05-24 PROBLEM — Z98.891 H/O: CESAREAN SECTION: Status: ACTIVE | Noted: 2022-05-24

## 2022-05-24 PROBLEM — Z3A.40 40 WEEKS GESTATION OF PREGNANCY: Status: ACTIVE | Noted: 2022-05-24

## 2022-05-24 PROBLEM — O13.9 GESTATIONAL HYPERTENSION: Status: ACTIVE | Noted: 2022-05-24

## 2022-05-24 PROCEDURE — 2580000003 HC RX 258: Performed by: STUDENT IN AN ORGANIZED HEALTH CARE EDUCATION/TRAINING PROGRAM

## 2022-05-24 PROCEDURE — 7100000001 HC PACU RECOVERY - ADDTL 15 MIN: Performed by: SPECIALIST

## 2022-05-24 PROCEDURE — 6360000002 HC RX W HCPCS

## 2022-05-24 PROCEDURE — 7100000000 HC PACU RECOVERY - FIRST 15 MIN: Performed by: SPECIALIST

## 2022-05-24 PROCEDURE — 6370000000 HC RX 637 (ALT 250 FOR IP): Performed by: STUDENT IN AN ORGANIZED HEALTH CARE EDUCATION/TRAINING PROGRAM

## 2022-05-24 PROCEDURE — 6360000002 HC RX W HCPCS: Performed by: STUDENT IN AN ORGANIZED HEALTH CARE EDUCATION/TRAINING PROGRAM

## 2022-05-24 PROCEDURE — 3700000001 HC ADD 15 MINUTES (ANESTHESIA): Performed by: SPECIALIST

## 2022-05-24 PROCEDURE — 1220000000 HC SEMI PRIVATE OB R&B

## 2022-05-24 PROCEDURE — A4216 STERILE WATER/SALINE, 10 ML: HCPCS | Performed by: STUDENT IN AN ORGANIZED HEALTH CARE EDUCATION/TRAINING PROGRAM

## 2022-05-24 PROCEDURE — 3700000000 HC ANESTHESIA ATTENDED CARE: Performed by: SPECIALIST

## 2022-05-24 PROCEDURE — 6360000002 HC RX W HCPCS: Performed by: ANESTHESIOLOGY

## 2022-05-24 PROCEDURE — 2500000003 HC RX 250 WO HCPCS: Performed by: SPECIALIST

## 2022-05-24 PROCEDURE — 3609079900 HC CESAREAN SECTION: Performed by: SPECIALIST

## 2022-05-24 PROCEDURE — 2709999900 HC NON-CHARGEABLE SUPPLY: Performed by: SPECIALIST

## 2022-05-24 PROCEDURE — 2500000003 HC RX 250 WO HCPCS: Performed by: STUDENT IN AN ORGANIZED HEALTH CARE EDUCATION/TRAINING PROGRAM

## 2022-05-24 PROCEDURE — 2500000003 HC RX 250 WO HCPCS: Performed by: NURSE ANESTHETIST, CERTIFIED REGISTERED

## 2022-05-24 PROCEDURE — 6360000002 HC RX W HCPCS: Performed by: NURSE ANESTHETIST, CERTIFIED REGISTERED

## 2022-05-24 PROCEDURE — 59510 CESAREAN DELIVERY: CPT | Performed by: SPECIALIST

## 2022-05-24 PROCEDURE — 88307 TISSUE EXAM BY PATHOLOGIST: CPT

## 2022-05-24 RX ORDER — BISACODYL 10 MG
10 SUPPOSITORY, RECTAL RECTAL DAILY PRN
Status: DISCONTINUED | OUTPATIENT
Start: 2022-05-24 | End: 2022-05-27 | Stop reason: HOSPADM

## 2022-05-24 RX ORDER — ACETAMINOPHEN 325 MG/1
975 TABLET ORAL ONCE
Status: DISCONTINUED | OUTPATIENT
Start: 2022-05-24 | End: 2022-05-24

## 2022-05-24 RX ORDER — IBUPROFEN 600 MG/1
600 TABLET ORAL EVERY 6 HOURS PRN
Qty: 60 TABLET | Refills: 1 | Status: SHIPPED | OUTPATIENT
Start: 2022-05-24 | End: 2022-06-30

## 2022-05-24 RX ORDER — KETOROLAC TROMETHAMINE 30 MG/ML
30 INJECTION, SOLUTION INTRAMUSCULAR; INTRAVENOUS EVERY 6 HOURS
Status: DISCONTINUED | OUTPATIENT
Start: 2022-05-25 | End: 2022-05-24 | Stop reason: HOSPADM

## 2022-05-24 RX ORDER — METRONIDAZOLE 500 MG/1
500 TABLET ORAL EVERY 8 HOURS SCHEDULED
Status: COMPLETED | OUTPATIENT
Start: 2022-05-25 | End: 2022-05-26

## 2022-05-24 RX ORDER — DEXAMETHASONE SODIUM PHOSPHATE 10 MG/ML
INJECTION INTRAMUSCULAR; INTRAVENOUS PRN
Status: DISCONTINUED | OUTPATIENT
Start: 2022-05-24 | End: 2022-05-24 | Stop reason: SDUPTHER

## 2022-05-24 RX ORDER — CARBOPROST TROMETHAMINE 250 UG/ML
INJECTION, SOLUTION INTRAMUSCULAR PRN
Status: DISCONTINUED | OUTPATIENT
Start: 2022-05-24 | End: 2022-05-24 | Stop reason: ALTCHOICE

## 2022-05-24 RX ORDER — DIPHENHYDRAMINE HYDROCHLORIDE 50 MG/ML
25 INJECTION INTRAMUSCULAR; INTRAVENOUS EVERY 6 HOURS PRN
Status: DISCONTINUED | OUTPATIENT
Start: 2022-05-24 | End: 2022-05-27 | Stop reason: HOSPADM

## 2022-05-24 RX ORDER — CARBOPROST TROMETHAMINE 250 UG/ML
INJECTION, SOLUTION INTRAMUSCULAR PRN
Status: DISCONTINUED | OUTPATIENT
Start: 2022-05-24 | End: 2022-05-24 | Stop reason: SDUPTHER

## 2022-05-24 RX ORDER — ONDANSETRON 4 MG/1
4 TABLET, ORALLY DISINTEGRATING ORAL EVERY 8 HOURS PRN
Qty: 10 TABLET | Refills: 0 | Status: SHIPPED | OUTPATIENT
Start: 2022-05-24 | End: 2022-06-10

## 2022-05-24 RX ORDER — KETOROLAC TROMETHAMINE 30 MG/ML
30 INJECTION, SOLUTION INTRAMUSCULAR; INTRAVENOUS EVERY 6 HOURS
Status: DISCONTINUED | OUTPATIENT
Start: 2022-05-24 | End: 2022-05-24

## 2022-05-24 RX ORDER — SODIUM CHLORIDE 0.9 % (FLUSH) 0.9 %
5-40 SYRINGE (ML) INJECTION PRN
Status: DISCONTINUED | OUTPATIENT
Start: 2022-05-24 | End: 2022-05-27 | Stop reason: HOSPADM

## 2022-05-24 RX ORDER — OXYCODONE HYDROCHLORIDE 5 MG/1
5 TABLET ORAL EVERY 4 HOURS PRN
Status: DISCONTINUED | OUTPATIENT
Start: 2022-05-24 | End: 2022-05-27 | Stop reason: HOSPADM

## 2022-05-24 RX ORDER — OXYCODONE HYDROCHLORIDE 5 MG/1
5 TABLET ORAL EVERY 6 HOURS PRN
Qty: 15 TABLET | Refills: 0 | Status: SHIPPED | OUTPATIENT
Start: 2022-05-24 | End: 2022-05-29

## 2022-05-24 RX ORDER — AMOXICILLIN 250 MG
1 CAPSULE ORAL DAILY
Qty: 60 TABLET | Refills: 0 | Status: SHIPPED | OUTPATIENT
Start: 2022-05-24 | End: 2022-06-10

## 2022-05-24 RX ORDER — ONDANSETRON 2 MG/ML
4 INJECTION INTRAMUSCULAR; INTRAVENOUS EVERY 6 HOURS PRN
Status: DISCONTINUED | OUTPATIENT
Start: 2022-05-24 | End: 2022-05-24 | Stop reason: HOSPADM

## 2022-05-24 RX ORDER — POLYETHYLENE GLYCOL 3350 17 G/17G
17 POWDER, FOR SOLUTION ORAL DAILY PRN
Status: DISCONTINUED | OUTPATIENT
Start: 2022-05-24 | End: 2022-05-27 | Stop reason: HOSPADM

## 2022-05-24 RX ORDER — TRANEXAMIC ACID 100 MG/ML
INJECTION, SOLUTION INTRAVENOUS PRN
Status: DISCONTINUED | OUTPATIENT
Start: 2022-05-24 | End: 2022-05-24 | Stop reason: SDUPTHER

## 2022-05-24 RX ORDER — CEPHALEXIN 500 MG/1
500 CAPSULE ORAL EVERY 8 HOURS SCHEDULED
Status: COMPLETED | OUTPATIENT
Start: 2022-05-25 | End: 2022-05-26

## 2022-05-24 RX ORDER — PHENYLEPHRINE HYDROCHLORIDE 10 MG/ML
INJECTION INTRAVENOUS PRN
Status: DISCONTINUED | OUTPATIENT
Start: 2022-05-24 | End: 2022-05-24 | Stop reason: SDUPTHER

## 2022-05-24 RX ORDER — MORPHINE SULFATE 1 MG/ML
INJECTION, SOLUTION EPIDURAL; INTRATHECAL; INTRAVENOUS PRN
Status: DISCONTINUED | OUTPATIENT
Start: 2022-05-24 | End: 2022-05-24 | Stop reason: SDUPTHER

## 2022-05-24 RX ORDER — VITAMIN A, ASCORBIC ACID, CHOLECALCIFEROL, .ALPHA.-TOCOPHEROL ACETATE, DL-, THIAMINE MONONITRATE, RIBOFLAVIN, NIACINAMIDE, PYRIDOXINE HYDROCHLORIDE, FOLIC ACID, CYANOCOBALAMIN, CALCIUM CARBONATE, IRON, ZINC OXIDE, AND CUPRIC OXIDE 4000; 120; 400; 22; 1.84; 3; 20; 10; 1; 12; 200; 29; 25; 2 [IU]/1; MG/1; [IU]/1; [IU]/1; MG/1; MG/1; MG/1; MG/1; MG/1; UG/1; MG/1; MG/1; MG/1; MG/1
1 TABLET ORAL DAILY
Status: DISCONTINUED | OUTPATIENT
Start: 2022-05-25 | End: 2022-05-27 | Stop reason: HOSPADM

## 2022-05-24 RX ORDER — ACETAMINOPHEN 500 MG
1000 TABLET ORAL EVERY 6 HOURS
Status: DISCONTINUED | OUTPATIENT
Start: 2022-05-24 | End: 2022-05-27 | Stop reason: HOSPADM

## 2022-05-24 RX ORDER — SODIUM CHLORIDE, SODIUM LACTATE, POTASSIUM CHLORIDE, CALCIUM CHLORIDE 600; 310; 30; 20 MG/100ML; MG/100ML; MG/100ML; MG/100ML
INJECTION, SOLUTION INTRAVENOUS CONTINUOUS
Status: DISCONTINUED | OUTPATIENT
Start: 2022-05-24 | End: 2022-05-25

## 2022-05-24 RX ORDER — TRISODIUM CITRATE DIHYDRATE AND CITRIC ACID MONOHYDRATE 500; 334 MG/5ML; MG/5ML
30 SOLUTION ORAL ONCE
Status: COMPLETED | OUTPATIENT
Start: 2022-05-24 | End: 2022-05-24

## 2022-05-24 RX ORDER — LANOLIN 72 %
OINTMENT (GRAM) TOPICAL
Status: DISCONTINUED | OUTPATIENT
Start: 2022-05-24 | End: 2022-05-27 | Stop reason: HOSPADM

## 2022-05-24 RX ORDER — TRANEXAMIC ACID 10 MG/ML
INJECTION, SOLUTION INTRAVENOUS CONTINUOUS PRN
Status: COMPLETED | OUTPATIENT
Start: 2022-05-24 | End: 2022-05-24

## 2022-05-24 RX ORDER — ONDANSETRON 2 MG/ML
INJECTION INTRAMUSCULAR; INTRAVENOUS PRN
Status: DISCONTINUED | OUTPATIENT
Start: 2022-05-24 | End: 2022-05-24 | Stop reason: SDUPTHER

## 2022-05-24 RX ORDER — DOCUSATE SODIUM 100 MG/1
100 CAPSULE, LIQUID FILLED ORAL DAILY
Status: DISCONTINUED | OUTPATIENT
Start: 2022-05-25 | End: 2022-05-27 | Stop reason: HOSPADM

## 2022-05-24 RX ORDER — IBUPROFEN 600 MG/1
600 TABLET ORAL EVERY 6 HOURS
Status: DISCONTINUED | OUTPATIENT
Start: 2022-05-25 | End: 2022-05-27 | Stop reason: HOSPADM

## 2022-05-24 RX ORDER — ONDANSETRON 2 MG/ML
4 INJECTION INTRAMUSCULAR; INTRAVENOUS EVERY 6 HOURS PRN
Status: DISCONTINUED | OUTPATIENT
Start: 2022-05-24 | End: 2022-05-27 | Stop reason: HOSPADM

## 2022-05-24 RX ORDER — KETOROLAC TROMETHAMINE 30 MG/ML
INJECTION, SOLUTION INTRAMUSCULAR; INTRAVENOUS PRN
Status: DISCONTINUED | OUTPATIENT
Start: 2022-05-24 | End: 2022-05-24 | Stop reason: SDUPTHER

## 2022-05-24 RX ORDER — SIMETHICONE 80 MG
80 TABLET,CHEWABLE ORAL EVERY 6 HOURS PRN
Status: DISCONTINUED | OUTPATIENT
Start: 2022-05-24 | End: 2022-05-27 | Stop reason: HOSPADM

## 2022-05-24 RX ORDER — SODIUM CHLORIDE 9 MG/ML
INJECTION, SOLUTION INTRAVENOUS PRN
Status: DISCONTINUED | OUTPATIENT
Start: 2022-05-24 | End: 2022-05-27 | Stop reason: HOSPADM

## 2022-05-24 RX ORDER — SODIUM CHLORIDE 0.9 % (FLUSH) 0.9 %
5-40 SYRINGE (ML) INJECTION EVERY 12 HOURS SCHEDULED
Status: DISCONTINUED | OUTPATIENT
Start: 2022-05-24 | End: 2022-05-27 | Stop reason: HOSPADM

## 2022-05-24 RX ORDER — LIDOCAINE HYDROCHLORIDE 20 MG/ML
INJECTION, SOLUTION EPIDURAL; INFILTRATION; INTRACAUDAL; PERINEURAL PRN
Status: DISCONTINUED | OUTPATIENT
Start: 2022-05-24 | End: 2022-05-24 | Stop reason: SDUPTHER

## 2022-05-24 RX ADMIN — TRANEXAMIC ACID 1000 MG: 100 INJECTION, SOLUTION INTRAVENOUS at 18:27

## 2022-05-24 RX ADMIN — ROPIVACAINE HYDROCHLORIDE 10 ML/HR: 2 INJECTION, SOLUTION EPIDURAL; INFILTRATION at 06:33

## 2022-05-24 RX ADMIN — KETOROLAC TROMETHAMINE 30 MG: 30 INJECTION, SOLUTION INTRAMUSCULAR at 19:10

## 2022-05-24 RX ADMIN — ACETAMINOPHEN 1000 MG: 500 TABLET ORAL at 23:30

## 2022-05-24 RX ADMIN — SODIUM CHLORIDE, POTASSIUM CHLORIDE, SODIUM LACTATE AND CALCIUM CHLORIDE: 600; 310; 30; 20 INJECTION, SOLUTION INTRAVENOUS at 23:46

## 2022-05-24 RX ADMIN — SODIUM CHLORIDE, POTASSIUM CHLORIDE, SODIUM LACTATE AND CALCIUM CHLORIDE: 600; 310; 30; 20 INJECTION, SOLUTION INTRAVENOUS at 08:22

## 2022-05-24 RX ADMIN — SODIUM CHLORIDE, POTASSIUM CHLORIDE, SODIUM LACTATE AND CALCIUM CHLORIDE: 600; 310; 30; 20 INJECTION, SOLUTION INTRAVENOUS at 13:21

## 2022-05-24 RX ADMIN — ONDANSETRON 4 MG: 2 INJECTION INTRAMUSCULAR; INTRAVENOUS at 18:42

## 2022-05-24 RX ADMIN — Medication 909 ML/HR: at 18:26

## 2022-05-24 RX ADMIN — MORPHINE SULFATE 2 MG: 1 INJECTION, SOLUTION EPIDURAL; INTRATHECAL; INTRAVENOUS at 18:37

## 2022-05-24 RX ADMIN — SODIUM CHLORIDE, POTASSIUM CHLORIDE, SODIUM LACTATE AND CALCIUM CHLORIDE: 600; 310; 30; 20 INJECTION, SOLUTION INTRAVENOUS at 18:45

## 2022-05-24 RX ADMIN — ACETAMINOPHEN 1000 MG: 500 TABLET ORAL at 15:55

## 2022-05-24 RX ADMIN — DOCUSATE SODIUM 100 MG: 100 CAPSULE ORAL at 20:56

## 2022-05-24 RX ADMIN — Medication 2000 MG: at 17:46

## 2022-05-24 RX ADMIN — FAMOTIDINE 20 MG: 10 INJECTION, SOLUTION INTRAVENOUS at 15:56

## 2022-05-24 RX ADMIN — LIDOCAINE HYDROCHLORIDE 5 ML: 20 INJECTION, SOLUTION EPIDURAL; INFILTRATION; INTRACAUDAL; PERINEURAL at 18:08

## 2022-05-24 RX ADMIN — LIDOCAINE HYDROCHLORIDE 5 ML: 20 INJECTION, SOLUTION EPIDURAL; INFILTRATION; INTRACAUDAL; PERINEURAL at 18:11

## 2022-05-24 RX ADMIN — PHENYLEPHRINE HYDROCHLORIDE 100 MCG: 10 INJECTION INTRAVENOUS at 18:39

## 2022-05-24 RX ADMIN — LIDOCAINE HYDROCHLORIDE 5 ML: 20 INJECTION, SOLUTION EPIDURAL; INFILTRATION; INTRACAUDAL; PERINEURAL at 18:02

## 2022-05-24 RX ADMIN — CARBOPROST TROMETHAMINE 250 MCG: 250 INJECTION, SOLUTION INTRAMUSCULAR at 18:34

## 2022-05-24 RX ADMIN — ROPIVACAINE HYDROCHLORIDE 10 ML/HR: 2 INJECTION, SOLUTION EPIDURAL; INFILTRATION at 13:18

## 2022-05-24 RX ADMIN — PHENYLEPHRINE HYDROCHLORIDE 100 MCG: 10 INJECTION INTRAVENOUS at 18:18

## 2022-05-24 RX ADMIN — LIDOCAINE HYDROCHLORIDE 5 ML: 20 INJECTION, SOLUTION EPIDURAL; INFILTRATION; INTRACAUDAL; PERINEURAL at 18:05

## 2022-05-24 RX ADMIN — OXYCODONE 5 MG: 5 TABLET ORAL at 23:31

## 2022-05-24 RX ADMIN — KETOROLAC TROMETHAMINE 30 MG: 30 INJECTION, SOLUTION INTRAMUSCULAR at 20:45

## 2022-05-24 RX ADMIN — SODIUM CITRATE AND CITRIC ACID MONOHYDRATE 30 ML: 500; 334 SOLUTION ORAL at 17:47

## 2022-05-24 RX ADMIN — Medication 500 MG: at 15:35

## 2022-05-24 RX ADMIN — DEXAMETHASONE SODIUM PHOSPHATE 5 MG: 10 INJECTION INTRAMUSCULAR; INTRAVENOUS at 18:42

## 2022-05-24 RX ADMIN — LIDOCAINE HYDROCHLORIDE 5 ML: 20 INJECTION, SOLUTION EPIDURAL; INFILTRATION; INTRACAUDAL; PERINEURAL at 18:29

## 2022-05-24 ASSESSMENT — PAIN SCALES - GENERAL
PAINLEVEL_OUTOF10: 9
PAINLEVEL_OUTOF10: 4

## 2022-05-24 ASSESSMENT — PAIN - FUNCTIONAL ASSESSMENT
PAIN_FUNCTIONAL_ASSESSMENT: ACTIVITIES ARE NOT PREVENTED
PAIN_FUNCTIONAL_ASSESSMENT: ACTIVITIES ARE NOT PREVENTED

## 2022-05-24 ASSESSMENT — PAIN DESCRIPTION - LOCATION
LOCATION: INCISION
LOCATION: INCISION

## 2022-05-24 ASSESSMENT — PAIN DESCRIPTION - ORIENTATION: ORIENTATION: LOWER

## 2022-05-24 ASSESSMENT — PAIN DESCRIPTION - DESCRIPTORS: DESCRIPTORS: BURNING

## 2022-05-24 NOTE — FLOWSHEET NOTE
Dr. Anders Bush at bedside to evaluate and discuss plan of care. Patient tearful, frustrated at lack of change. Reassurance offered. Patient to discuss options with family.

## 2022-05-24 NOTE — ANESTHESIA PRE PROCEDURE
Department of Anesthesiology  Preprocedure Note       Name:  Marvel Ward   Age:  32 y.o.  :  1995                                          MRN:  [id number]         Date:  2022      Surgeon: * No surgeons listed *    Procedure: * No procedures listed *    Medications prior to admission:   Prior to Admission medications    Medication Sig Start Date End Date Taking?  Authorizing Provider   Prenatal Vit-Fe Fumarate-FA (PRENATAL PO) Take by mouth    Historical Provider, MD       Current medications:    Current Facility-Administered Medications   Medication Dose Route Frequency Provider Last Rate Last Admin    oxytocin (PITOCIN) 30 units in 500 mL infusion  1 alize-units/min IntraVENous Continuous [place] Shshiva Lawrence Shove, DO 10 mL/hr at 22 1800 10 alize-units/min at 22 1800    ropivacaine (NAROPIN) 0.2% injection 0.2%             naloxone 0.4 mg in 10 mL sodium chloride syringe   IntraVENous PRN Yue Garcia MD        ondansetron [facility]) injection 4 mg  4 mg IntraVENous Q6H PRN Yue Garcia MD        ropivacaine 0.2% in sodium chloride 0.9% (OB) epidural 100 mL  10 mL/hr Epidural Continuous Yue Garcia MD        lactated ringers infusion   IntraVENous Continuous Ludin Plump,  mL/hr at 22 New Bag at 22    lactated ringers bolus  500 mL IntraVENous PRN Ludin Plump, DO        Or    lactated ringers bolus  1,000 mL IntraVENous PRN Ludin Plump,  mL/hr at 22 1,000 mL at 22    sodium chloride flush 0.9 % injection 5-40 mL  5-40 mL IntraVENous 2 times per day Ludin Plump, DO        sodium chloride flush 0.9 % injection 5-40 mL  5-40 mL IntraVENous PRN Ludin Plump, DO        0.9 % sodium chloride infusion  25 mL IntraVENous PRN Ludin Plump, DO        lidocaine PF 1 % injection 30 mL  30 mL Other PRN Ludin Plump, DO        ondansetron (ZOFRAN) injection 4 mg  4 mg IntraVENous Q6H PRN Ludin Plump, DO        diphenhydrAMINE (BENADRYL) tablet 25 mg  25 mg Oral Q4H PRN [place] Ray, DO        acetaminophen (TYLENOL) tablet 1,000 mg  1,000 mg Oral Q6H PRN [place] Ray, DO        benzocaine-menthol (DERMOPLAST) 20-0.5 % spray   Topical PRN [place] Ray, DO        docusate sodium (COLACE) capsule 100 mg  100 mg Oral BID [place] Ray, DO           Allergies: Allergies   Allergen Reactions    Latex        Problem List:    Patient Active Problem List   Diagnosis Code    39 weeks gestation of pregnancy Z3A.[id number]       Past Medical History:  History reviewed. No pertinent past medical history. Past Surgical History:  History reviewed. No pertinent surgical history.     Social History:    Social History     Tobacco Use    Smoking status: Never Smoker    Smokeless tobacco: Never Used   Substance Use Topics    Alcohol use: Not Currently                                Counseling given: Not Answered      Vital Signs (Current):   Vitals:    05/23/22 1900 05/23/22 2000 05/23/22 2005 05/23/22 2054   BP: 131/62  134/82 137/84   Pulse: 90  83 80   Resp: 16  16 16   Temp:  37.1 °C (98.7 °F)     TempSrc:  Oral     SpO2:                                                  BP Readings from Last 3 Encounters:   05/23/22 137/84   05/18/22 116/74   05/12/22 118/70       NPO Status:                                                                                 BMI:   Wt Readings from Last 3 Encounters:   05/18/22 226 lb 8 oz (102.7 kg)   05/12/22 220 lb 2 oz (99.8 kg)   05/05/22 219 lb 7 oz (99.5 kg)     There is no height or weight on file to calculate BMI.    CBC:   Lab Results   Component Value Date    WBC 9.1 05/22/2022    RBC 3.81 05/22/2022    HGB 12.0 05/22/2022    HCT 34.8 05/22/2022    MCV 91.3 05/22/2022    RDW 13.4 05/22/2022     05/22/2022       CMP:   Lab Results   Component Value Date     05/22/2022    K 3.6 05/22/2022     05/22/2022    CO2 24 05/22/2022    BUN 4 05/22/2022    CREATININE 0.40 05/22/2022    GFRAA >60 05/22/2022    LABGLOM >60 05/22/2022    GLUCOSE 92 05/22/2022    PROT 6.4 05/22/2022    CALCIUM 9.4 05/22/2022    BILITOT 0.17 05/22/2022    ALKPHOS 129 05/22/2022    AST 14 05/22/2022    ALT 11 05/22/2022       POC Tests: No results for input(s): POCGLU, POCNA, POCK, POCCL, POCBUN, POCHEMO, POCHCT in the last 72 hours. Coags: No results found for: PROTIME, INR, APTT    HCG (If Applicable): No results found for: PREGTESTUR, PREGSERUM, HCG, HCGQUANT     ABGs: No results found for: PHART, PO2ART, WSI8NUB, QXT0KIO, BEART, S8QGYJPS     Type & Screen (If Applicable):  No results found for: LABABO, LABRH    Drug/Infectious Status (If Applicable):  Lab Results   Component Value Date    HEPCAB NONREACTIVE 10/25/2021       COVID-19 Screening (If Applicable): No results found for: COVID19        Anesthesia Evaluation  Patient summary reviewed no history of anesthetic complications:   Airway: Mallampati: II  TM distance: >3 FB     Mouth opening: > = 3 FB   Dental: normal exam         Pulmonary:Negative Pulmonary ROS                              Cardiovascular:  Exercise tolerance: good (>4 METS),                     Neuro/Psych:   Negative Neuro/Psych ROS              GI/Hepatic/Renal: Neg GI/Hepatic/Renal ROS            Endo/Other: Negative Endo/Other ROS                    Abdominal:             Vascular: Other Findings:           Anesthesia Plan      epidural     ASA 2             Anesthetic plan and risks discussed with patient.                         Yamileth Cisneros, APRN - CRNA   5/23/2022

## 2022-05-24 NOTE — FLOWSHEET NOTE
IV bolus initiated, pitocin halved. Patient reports feeling more pressure, mostly with contractions/peak, but intermittent pressure between contractions.

## 2022-05-24 NOTE — PROGRESS NOTES
Labor Progress Note    Albertina Jaquez is a 32 y.o. female  at 44w3d  The patient was seen and examined. Her pain is well controlled with epidural. Patient reports more pressure with contractions. She reports fetal movement is present, complains of contractions, complains of loss of fluid, denies vaginal bleeding. Vital Signs:  Vitals:    22 1100 22 1130 22 1200 22 1230   BP: (!) 138/92 129/77 (!) 134/102 112/85   Pulse: 82 74 81 104   Resp: 14 14 16 16   Temp:   98 °F (36.7 °C)    TempSrc:   Oral    SpO2: 98% 98% 100% 100%         FHT: 130, moderate variability, accelerations present, occasional late deceleration that resolves with position changes  Contractions: regular, every 2-3 minutes  [place] Units: 150MVUs    Chaperone for Intimate Exam: Chaperone was present for entire exam, Chaperone Name: Idalia Woods RN  Cervical Exam: 4 dilated, 50% effaced, -1 station (out of 3 station)  Pitocin: @ 14 mu/min    Membranes: Ruptured clear fluid  Scalp Electrode in place: absent  Intrauterine Pressure Catheter in Place: present    Interventions: none    Assessment/Plan:  Albertina Jaquez is a 32 y.o. female  at 44w3d here for RR IOL   - GBS negative, No indication for GBS prophylaxis   - Cat I FHT, TOCO q2-3 min   - MVUs: not adequate   - SVE 4/50/-1 (out of 3 station)   - VSS   - Continue pitocin per protocol   - S/p AROM (clr) @7029 with IUPC   - Epidural in place and functioning   - Will keep NPO    - S/p davis balloon    - S/p cytotec 50 PVx1, 50 PO x2   - S/p morphine/phenergan x1    gHTN (new dx)     - PreE labs WNL x1, P/C 0.17   - Pt denies any s/s PreE   - BPs stable    Dr. Fern Haji updated and in agreement with plan.     Gini Champagne DO  Ob/Gyn Resident  2022, 12:57 PM

## 2022-05-24 NOTE — PROGRESS NOTES
Labor Progress Note    Morgan Alonzo is a 32 y.o. female  at 44w3d  The patient was seen and examined. Her pain is well controlled with epidural. Patient reports more pressure with contractions. She reports fetal movement is present, complains of contractions, complains of loss of fluid, denies vaginal bleeding. Vital Signs:  Vitals:    22 1330 22 1400 22 1430 22 1500   BP: (!) 141/90 118/64 130/73 138/87   Pulse: 87 86 80 87   Resp: 16 14 14 14   Temp:  98.1 °F (36.7 °C)     TempSrc:  Oral     SpO2: 99% 100% 100% 99%         FHT: 130, moderate variability, accelerations present, occasional late deceleration that resolves with position changes  Contractions: regular, every 2-3 minutes  [place] Units: 150MVUs    Chaperone for Intimate Exam: Chaperone was present for entire exam, Chaperone Name: Michael Paul RN  Cervical Exam: 4 dilated, 50% effaced, -1 station (out of 3 station)  Pitocin: @ 20 mu/min    Membranes: Ruptured clear fluid  Scalp Electrode in place: absent  Intrauterine Pressure Catheter in Place: present    Interventions: none    Assessment/Plan:  Morgan Alonzo is a 32 y.o. female  at 44w3d here for RR IOL   - GBS negative, No indication for GBS prophylaxis   - Cat I FHT, TOCO q2-3 min   - MVUs: not adequate   - SVE 4/50/-1 (out of 3 station)   - VSS   - Will keep NPO    - Due to no cervical change for 24 hours, pt meets criteria for failed IOL and pt requesting  section   - R/B/A discussed and consent signed and placed in chart   - Ancef/Azithro/bicitra/tylenol/pepcid ordered   - Will notify Anesthesia/NICU   - Will discontinue pitocin   - S/p AROM (clr) @3823 with IUPC   - Epidural in place and functioning   - S/p davis balloon    - S/p cytotec 50 PVx1, 50 PO x2   - S/p morphine/phenergan x1    gHTN (new dx)     - PreE labs WNL x1, P/C 0.17   - Pt denies any s/s PreE   - BPs stable    Dr. Olga Cordoba updated and in agreement with plan.     Justine Guzman, DO  Ob/Gyn Resident  5/24/2022, 3:17 PM

## 2022-05-24 NOTE — PROGRESS NOTES
OBGYN Labor Progress Note    Emilee Ribeiro is a 32 y.o. female  at 37w0d  The patient was seen and examined. Her pain is well controlled with epidural. She reports fetal movement is present, complains of contractions, denies loss of fluid, denies vaginal bleeding.        Vital Signs:  Vitals:    22 2301   BP: 115/61   Pulse: 79   Resp: 18   Temp:    SpO2:          FHT: 135, moderate variability, accelerations present, decelerations absent  Contractions: regular, every 2-3 mins      Cervical Exam: 4-5/50/-1  Pitocin: @ 10 mu/min    Membranes: Ruptured clear fluid @ 1610  Scalp Electrode in place: absent  Intrauterine Pressure Catheter in Place: present    Interventions: IUPC placed without difficulty    Assessment/Plan:  Emilee Ribeiro is a 32 y.o. female  at 37w0d IUP   - GBS negative / Rh positive / R immune   - No indication for GBS prophylaxis    RR IOL   - VSS   - cEFM and TOCO   - IVF:  mL/hr   - Cytotec 50mcg PV x1, 50mcg PO x2   - S/p morphine/compazine x1   - S/p davis balloon   - AROM clear fluid at 1610   - Epidural   - Pitocin   - IUPC in place    - Continue expectant management    Attending and senior resident updated and in agreement with plan    Ria Severin, DO OBGYN Resident  2022, 11:23 PM

## 2022-05-24 NOTE — OP NOTE
Operative Note  Department of Obstetrics and Gynecology  [facility]     Patient: Lili Rahman   : 1995  MRN: [id number]       Acct: [de-identified]   PCP: No primary care provider on file. Date of Procedure: 22    Pre-operative Diagnosis: 32 y.o. female  at 40w1d IUP   Maternal request              Failed IOL               RR IOL              gHTN   BMI 36     Post-operative Diagnosis: Living  infant, same     Procedure: primary low transverse  section     Surgeon: Dr. Servando Canada  Assistant(s): Madeleine Becker DO, PGY3; Stacy Forde, PGY1     Anesthesia: epidural with Duramorph    Procedure Details   The patient was seen pre-operatively. The risks, benefits, complications, treatment options, and expected outcomes were discussed with the patient. The patient concurred with the proposed plan, giving informed consent. The patient was taken to the Operating Room, identified as Lili Rahman and the procedure verified as  Delivery. A Time Out was held and the above information confirmed. After redosing epidural anesthesia, the patient was draped and prepped in the usual sterile manner. A Pfannenstiel incision was made and carried down through the subcutaneous tissue to the fascia using scalpel. Fascial incision was made and extended transversely using slater scissors for sharp dissection. The fascia was  from the underlying rectus tissue superiorly using blunt dissection . The peritoneum was identified and entered bluntly. Peritoneal incision was extended longitudinally with blunt stretch, bladder retractor was placed. A low transverse uterine incision was made using a new scalpel blade. Blunt stretch on the hysterotomy incision was made and the amniotomy was performed revealing clear fluid. Delivered from cephalic presentation was a Live Born female infant.  The infant was suctioned, dried and the umbilical cord was clamped and cut after one minute delayed cord clamping. The infant was taken to the warmer and attended by NICU for evaluation. A second section of cord was clamped and cut and sent for gases. Cord blood was obtained for evaluation. The placenta was removed spontaneously with gentle traction and appeared intact, whole and that the umbilical cord had three vessels noted. Pitocin was started. The uterine outline appeared normal. The uterus was exteriorized and cleaned of all clots and debris. The uterine incision was closed with running locked sutures of 0 Chromic. TXA and Hemabate were given for uterine atony. Multiple figure of eight sutures with 0 Chromic were needed in the lateral sides of the hysterotomy incision to obtain excellent hemostasis. An O'Nahant stitch was placed on the right uterine artery. Hemostasis was observed. The uterus was reintroduced into the abdominal cavity. Bilateral abdominal gutters were cleared of all clots and debris. Bilateral tubes and ovaries were visualized and appeared normal. The hysterotomy was again inspected and found to be hemostatic. Abdomen was copiously irrigated. Peritoneum was reapproximated with running suture of 2-0 Vicryl. Rectus muscles were inspected and found to be hemostatic. The fascia was then reapproximated with running sutures of 1-0 Vicryl. The subcuticular space was irrigated copiously. The subcuticular space was closed using a 2-0 Plain gut suture in a running fashion. The skin was reapproximated with a 4-0 Monocryl. The skin was then cleansed and dressed with a Silver dressing in sterile fashion. Instrument, sponge, and needle counts were correct prior the abdominal closure and at the conclusion of the case. The urine remained clear throughout the case. Ancef and azithromycin was given for antibiotic prophylaxis. SCDs for DVT prophylaxis remain in place for the post operative period. Dr. Servando Canada was present for the entire operation.       Findings:  Live Born 6 lb 14 oz female infant in cephalic presentation with Apgars of 8 at 1 minute and 9 at five minutes, normal appearing uterus tubes and ovaries   Quantitative Blood Loss: 1381mL  Total IV Fluids: 1700 ml  Urine output: 125 ml clear urine   Drains:  davis catheter  Specimens:  placenta sent to pathology, cord blood and cord gases  Instrument and Sponge Count: Correct  Complications: none  Condition: Infant stable, transfer to [address], Mother stable, transfer to post anesthesia recovery       Sierra Katz DO  OB/GYN Resident  5/24/2022, 9:45 PM    This dictation was performed by a resident physician and then was thoroughly reviewed by the attending prior to being signed.

## 2022-05-24 NOTE — ANESTHESIA PROCEDURE NOTES
Epidural Block    Patient location during procedure: OB  Reason for block: labor epidural  Staffing  Performed: resident/CRNA   Resident/CRNA: ANA Coburn CRNA  Preanesthetic Checklist  Completed: patient identified, IV checked, site marked, risks and benefits discussed, surgical consent, monitors and equipment checked, pre-op evaluation, timeout performed, anesthesia consent given, oxygen available and patient being monitored  Epidural  Patient position: sitting  Prep: Betadine  Patient monitoring: continuous pulse ox and frequent blood pressure checks  Approach: midline  Location: L3-4  Injection technique: ANITA saline  Provider prep: mask and sterile gloves  Needle  Needle type: Tuohy   Needle gauge: 17 G  Needle length: 3.5 in  Needle insertion depth: 5 cm  Catheter type: multi-orifice  Catheter size: 19 G  Catheter at skin depth: 11 cm  Test dose: negativeCatheter Secured: tegaderm  Assessment  Hemodynamics: stable  Attempts: 1Outcomes: uncomplicated

## 2022-05-24 NOTE — FLOWSHEET NOTE
Dayron Moya, CRNA 2147 at bedside. Epidural procedure explained, risks discussed. Pt verbalizes consent for epidural.   2150 patient positioned for epidural. 2150  Time out completed. 2158  catheter placed. 2159 test dose given. Epidural catheter taped and secured per anesthesia. 2202  to low fowlers with left uterine displacement. 2205 loading dose given. 2208 pump initiated. Pt tolerated procedure well.

## 2022-05-24 NOTE — BRIEF OP NOTE
Department of Obstetrics and Gynecology  Obstetrical Brief Operative Report  [facility]    Patient: [place]   : 1995  MRN: [id number]       Acct: [de-identified]   Date of Procedure: 22    Pre-operative Diagnosis: 32 y.o. female  at 40w1d    Maternal request   Failed IOL    RR IOL   gHTN    Post-operative Diagnosis: Living  infant, same    Procedure: primary low transverse  section    Surgeon: Dr. Sigrid Hebert  Assistant(s):  Edward Sever, DO, PGY3; Josee Brian, PGY1    Anesthesia: epidural with Duramorph    Information for the patient's :  Margreta Simmonds [[id number]]   female   Birth Weight: 6 lb 14.2 oz (3.125 kg)     Information for the patient's :  Margreta Simmonds [[id number]]        Findings:  Live Born 6 lb 14 oz female infant in cephalic presentation with Apgars of 8 at 1 minute and 9 at five minutes, normal appearing uterus tubes and ovaries   Quantitative Blood Loss: pending  Total IV Fluids: 1700 ml  Urine output: 125 ml clear urine   Drains:  davis catheter  Specimens:  placenta sent to pathology, cord blood and cord gases  Instrument and Sponge Count: Correct  Complications: none  Condition: Infant stable, transfer to [facility], Mother stable, transfer to post anesthesia recovery    Edward Sever, DO  Ob/Gyn Resident  2022, 7:26 PM

## 2022-05-24 NOTE — PROGRESS NOTES
Labor Progress Note    Anna Marie Armstrong is a 32 y.o. female  at 44w3d  The patient was seen and examined. Her pain is well controlled epidural. She reports fetal movement is present, complains of contractions, complains of loss of fluid, denies vaginal bleeding.        Vital Signs:  Vitals:    22 0100 22 0201 22 0210 22 0230   BP: (!) 95/41 (!) 107/48  109/89   Pulse: 97 78  107   Resp: 16 16  16   Temp:   98.4 °F (36.9 °C)    TempSrc:   Oral    SpO2: 98%   100%       FHT: 125, moderate variability, accelerations present, decelerations absent  Contractions: regular, every 3-4 minutes    Chaperone for Intimate Exam: Chaperone was present for entire exam, Chaperone Name: Dm  Cervical Exam: 4 cm dilated, 50 effaced, -1 station  Pitocin: @ 5 mu/min    Membranes: Ruptured clear fluid  Scalp Electrode in place: absent  Intrauterine Pressure Catheter in Place: present    Interventions: SVE    Assessment/Plan:  Anna Marie Armstrong is a 32 y.o. female  at 44w3d admitted for IOL 2/2 gHTN   - GBS negative, No indication for GBS prophylaxis   - VSS, afebrile   - cEFM/TOCO   - Pit @ 1215    - AROM (clr) @ 1615 / IUPC    - Epidural    - S/p Meng balloon    - S/p Cytotec 50 PV x1, 50 POx2    - S/p Morphine/compazine x1     - Continue to monitor    Attending updated and in agreement with plan    Lupis Oh DO  Ob/Gyn Resident  2022, 2:51 AM

## 2022-05-25 LAB
HCT VFR BLD CALC: 30.1 % (ref 36.3–47.1)
HEMOGLOBIN: 10.6 G/DL (ref 11.9–15.1)

## 2022-05-25 PROCEDURE — 85014 HEMATOCRIT: CPT

## 2022-05-25 PROCEDURE — 85018 HEMOGLOBIN: CPT

## 2022-05-25 PROCEDURE — 6370000000 HC RX 637 (ALT 250 FOR IP): Performed by: STUDENT IN AN ORGANIZED HEALTH CARE EDUCATION/TRAINING PROGRAM

## 2022-05-25 PROCEDURE — 36415 COLL VENOUS BLD VENIPUNCTURE: CPT

## 2022-05-25 PROCEDURE — 1220000000 HC SEMI PRIVATE OB R&B

## 2022-05-25 RX ADMIN — ACETAMINOPHEN 1000 MG: 500 TABLET ORAL at 06:23

## 2022-05-25 RX ADMIN — OXYCODONE 5 MG: 5 TABLET ORAL at 23:56

## 2022-05-25 RX ADMIN — ACETAMINOPHEN 1000 MG: 500 TABLET ORAL at 18:50

## 2022-05-25 RX ADMIN — IBUPROFEN 600 MG: 600 TABLET, FILM COATED ORAL at 19:46

## 2022-05-25 RX ADMIN — Medication 1 TABLET: at 09:38

## 2022-05-25 RX ADMIN — OXYCODONE 5 MG: 5 TABLET ORAL at 10:45

## 2022-05-25 RX ADMIN — OXYCODONE 5 MG: 5 TABLET ORAL at 19:46

## 2022-05-25 RX ADMIN — METRONIDAZOLE 500 MG: 500 TABLET, FILM COATED ORAL at 23:56

## 2022-05-25 RX ADMIN — OXYCODONE 5 MG: 5 TABLET ORAL at 06:21

## 2022-05-25 RX ADMIN — METRONIDAZOLE 500 MG: 500 TABLET, FILM COATED ORAL at 13:41

## 2022-05-25 RX ADMIN — IBUPROFEN 600 MG: 600 TABLET, FILM COATED ORAL at 13:41

## 2022-05-25 RX ADMIN — ACETAMINOPHEN 1000 MG: 500 TABLET ORAL at 12:32

## 2022-05-25 RX ADMIN — DOCUSATE SODIUM 100 MG: 100 CAPSULE ORAL at 07:55

## 2022-05-25 RX ADMIN — OXYCODONE 5 MG: 5 TABLET ORAL at 15:23

## 2022-05-25 RX ADMIN — METRONIDAZOLE 500 MG: 500 TABLET, FILM COATED ORAL at 06:21

## 2022-05-25 RX ADMIN — DOCUSATE SODIUM 100 MG: 100 CAPSULE ORAL at 19:46

## 2022-05-25 RX ADMIN — CEPHALEXIN 500 MG: 500 CAPSULE ORAL at 13:41

## 2022-05-25 RX ADMIN — CEPHALEXIN 500 MG: 500 CAPSULE ORAL at 23:56

## 2022-05-25 RX ADMIN — CEPHALEXIN 500 MG: 500 CAPSULE ORAL at 06:21

## 2022-05-25 ASSESSMENT — PAIN DESCRIPTION - DESCRIPTORS
DESCRIPTORS: CRAMPING;BURNING
DESCRIPTORS: CRAMPING;ACHING
DESCRIPTORS: ACHING;SORE
DESCRIPTORS: BURNING;CRAMPING
DESCRIPTORS: SORE
DESCRIPTORS: CRAMPING;ACHING
DESCRIPTORS: CRAMPING
DESCRIPTORS: CRAMPING

## 2022-05-25 ASSESSMENT — PAIN - FUNCTIONAL ASSESSMENT
PAIN_FUNCTIONAL_ASSESSMENT: ACTIVITIES ARE NOT PREVENTED

## 2022-05-25 ASSESSMENT — PAIN DESCRIPTION - LOCATION
LOCATION: ABDOMEN;INCISION
LOCATION: ABDOMEN
LOCATION: ABDOMEN;INCISION
LOCATION: ABDOMEN
LOCATION: ABDOMEN;INCISION
LOCATION: ABDOMEN
LOCATION: ABDOMEN;INCISION

## 2022-05-25 ASSESSMENT — PAIN DESCRIPTION - ORIENTATION
ORIENTATION: LOWER

## 2022-05-25 ASSESSMENT — PAIN SCALES - GENERAL
PAINLEVEL_OUTOF10: 5
PAINLEVEL_OUTOF10: 4
PAINLEVEL_OUTOF10: 7
PAINLEVEL_OUTOF10: 6
PAINLEVEL_OUTOF10: 7
PAINLEVEL_OUTOF10: 5
PAINLEVEL_OUTOF10: 5
PAINLEVEL_OUTOF10: 4
PAINLEVEL_OUTOF10: 7

## 2022-05-25 NOTE — PROGRESS NOTES
POST OPERATIVE DAY # 2    Chloe Hamman is a 32 y.o. female   This patient was seen and examined today. PLTCS on 5/24/22    Her pregnancy was complicated by:   Patient Active Problem List   Diagnosis    39 weeks gestation of pregnancy    gHTN (G1)    40 weeks gestation of pregnancy    PLTCS 5/24/22 F Apg 8/9 Wt 6#14       Today she is doing well without any chief complaint. Her lochia is light. She denies chest pain, shortness of breath, headache, lightheadedness, blurred vision and peripheral edema. She is bottle feeding and she denies any signs or symptoms of mastitis. She is ambulating well. She is voiding without difficulty. She currently denies S/S of postpartum depression. Flatus present. Bowel movement present. She is tolerating solids.     Vital Signs:  Vitals:    05/25/22 0621 05/25/22 0645 05/25/22 0800 05/25/22 1600   BP:  116/78 118/77 119/78   Pulse:  68 82 92   Resp: 18  16 16   Temp:  98.4 °F (36.9 °C) 97.9 °F (36.6 °C) 97.2 °F (36.2 °C)   TempSrc:  Oral Oral Oral   SpO2:   99%            Urine Input & Output last 24hrs:     Intake/Output Summary (Last 24 hours) at 5/26/2022 0049  Last data filed at 5/25/2022 1525  Gross per 24 hour   Intake    Output 1000 ml   Net -1000 ml       Physical Exam:  General:  no apparent distress, alert and cooperative  Neurologic:  alert, oriented, normal speech, no focal findings or movement disorder noted  Lungs:  No increased work of breathing, good air exchange, clear to auscultation bilaterally, no crackles or wheezing  Heart:  Regular rate and rhythm, normal S1 and S2, no S3 or S4, and no murmur noted    Abdomen: abdomen soft, non-distended, non-tender, bowel sounds present  Fundus: non-tender, firm, below umbilicus  Incision: clean and dry, silver dressing in place  Extremities:  no calf tenderness, non edematous    Labs:  Lab Results   Component Value Date    WBC 9.1 05/22/2022    HGB 10.6 (L) 05/25/2022    HCT 30.1 (L) 05/25/2022    MCV 91.3 05/22/2022  2022       Assessment/Plan:  1. Christy Todd is a  POD # 2 s/p PLTCS   - Doing well, VSS    - Female infant in [address]   - Encourage ambulation and use of incentive spirometer   - CBC completed; Hgb stable 10.6   - Keflex/Flagyl x48 hrs   - S/p TXA, Hemabate intraop  2. Rh positive/Rubella immune  3. Bottle feeding  4. gHTN (new diagnosis)   - BP normotensive   - PreE labs wnl, P/C 0.17   - Denies any s/s of PreE   - Continue to monitor  5. Continue post-op care. Counseling Completed:  Secondary Smoke risks and Sudden Infant Death Syndrome were reviewed with recommendations. Infant sleeping, \"back to sleep\" and avoidance of co-sleeping recommendations were reviewed. Signs and Symptoms of Post Partum Depression were reviewed. The patient is to call if any occur. Signs and symptoms of Mastitis were reviewed. The patient is to call if any occur for follow up. Discharge instructions including pelvic rest, incision care, 15 lb weight restriction, no driving with pain medicine and office follow-up were reviewed with patient     Attending Physician: Dr. Radha Gamez,   Ob/Gyn Resident  2022, 12:49 AM    I have discussed the care of Christy Todd, including pertinent history and exam findings, with the resident. I have seen and examined the patient and the key elements of all parts of the encounter have been performed by me. I agree with the assessment, plan and orders as documented by the resident.     Huy Urrutia MD  Attending Physician

## 2022-05-25 NOTE — ANESTHESIA POSTPROCEDURE EVALUATION
POST- ANESTHESIA EVALUATION       Pt Name: Keri Gonzales  MRN: [id number]  YOB: 1995  Date of evaluation: 2022  Time:  9:26 PM      /74   Pulse 83   Temp 98.1 °F (36.7 °C) (Oral)   Resp 17   LMP 2021   SpO2 97%   Breastfeeding Unknown      Consciousness Level  Awake  Cardiopulmonary Status  Stable  Pain Adequately Treated YES  Nausea / Vomiting  NO  Adequate Hydration  YES  Anesthesia Related Complications NONE      Electronically signed by Edna Vinson MD on 2022 at 9:26 PM       Department of Anesthesiology  Postprocedure Note    Patient: Keri Gonzales  MRN: [id number]  [place]: 1995  Date of evaluation: 2022  Time:  9:25 PM     Procedure Summary     Date: 22 Room / Location: [facility] OR [address] [place] [address]    Anesthesia Start:  Anesthesia Stop: 22    Procedures:        SECTION (N/A Abdomen)      Labor Analgesia Diagnosis:       Elective surgical procedure      (failure to progress)    Surgeons: West Shaw MD Responsible Provider: Edna Vinson MD    Anesthesia Type: epidural ASA Status: 2          Anesthesia Type: No value filed. Marvin Phase I: Marvin Score: 10    Marvin Phase II:      Last vitals: Reviewed and per EMR flowsheets.        Anesthesia Post Evaluation

## 2022-05-25 NOTE — PROGRESS NOTES
POST OPERATIVE DAY # 1    Emilee Ribeiro is a 32 y.o. female   This patient was seen and examined today. PLTCS on 22    Her pregnancy was complicated by:   Patient Active Problem List   Diagnosis    39 weeks gestation of pregnancy    gHTN (G1)    40 weeks gestation of pregnancy    PLTCS 22 F Apg 8/9 Wt 6#14       Today she is doing well without any chief complaint. Her lochia is light. She denies chest pain, shortness of breath, headache, lightheadedness, blurred vision and peripheral edema. She is bottle feeding and she denies any signs or symptoms of mastitis. She has not ambulated yet. She is voiding via davis catheter. She currently denies S/S of postpartum depression. Flatus present. Bowel movement absent. She is tolerating solids. Vital Signs:  Vitals:    22 0145   BP: 122/70   Pulse: 72   Resp: 18   Temp: 98.3 °F (36.8 °C)   SpO2: 99%       Urine Input & Output last 24hrs:     Intake/Output Summary (Last 24 hours) at 2022 0235  Last data filed at 2022 2100  Gross per 24 hour   Intake 1735.84 ml   Output 5756 ml   Net -4020.16 ml       Physical Exam:  General:  no apparent distress, alert and cooperative  Neurologic:  alert, oriented, normal speech, no focal findings or movement disorder noted  Lungs:  No increased work of breathing, good air exchange, clear to auscultation bilaterally, no crackles or wheezing  Heart:  Regular rate and rhythm, normal S1 and S2, no S3 or S4, and no murmur noted    Abdomen: abdomen soft, non-distended, non-tender, bowel sounds present   Fundus: non-tender, firm, below umbilicus  Incision: clean, dry and silver dressing in place  Extremities:  no calf tenderness, non edematous    Labs:  Lab Results   Component Value Date    WBC 9.1 2022    HGB 10.6 (L) 2022    HCT 30.1 (L) 2022    MCV 91.3 2022     2022       Assessment/Plan:  1.  Emilee Ribeiro is a  POD # 1 s/p PLTCS   - Doing well, VSS    - Female infant in [address]   - Encourage ambulation and use of incentive spirometer   - D/C davis catheter and saline lock IV on POD #1    - CBC awaiting   - Keflex/Flagyl x48hrs   - S/p TXA, Hemabate   2. Rh positive/Rubella immune  3. Bottle feeding  4. gHTN (new diagnosis)   - BP normotensive    - PreE labs wnl, P/C 0.17   - Denies any s/s of PreE   - Continue to monitor  5. Continue post-op care. Counseling Completed:  Secondary Smoke risks and Sudden Infant Death Syndrome were reviewed with recommendations. Infant sleeping, \"back to sleep\" and avoidance of co-sleeping recommendations were reviewed. Signs and Symptoms of Post Partum Depression were reviewed. The patient is to call if any occur. Signs and symptoms of Mastitis were reviewed. The patient is to call if any occur for follow up. Discharge instructions including pelvic rest, incision care, 15 lb weight restriction, no driving with pain medicine and office follow-up were reviewed with patient     Attending Physician: Dr. Salinas Phoenix,   Ob/Gyn Resident  5/25/2022, 2:35 AM          Attending Physician Statement  I have discussed the care of [facility], including pertinent history and exam findings,  with the resident. I have seen and examined the patient and the key elements of all parts of the encounter have been performed by me. I agree with the assessment, plan and orders as documented by the resident. (GC Modifier)    PT seen and examined. She is doing well. Pain and bleeding is controlled and light. She denies CP/SOB/F/CH/N/V/HA. She needs to increase ambulation and PO hydration today.     Vitals:    05/25/22 0345 05/25/22 0621 05/25/22 0645 05/25/22 0800   BP:   116/78 118/77   Pulse:   68 82   Resp:  18  16   Temp:   98.4 °F (36.9 °C) 97.9 °F (36.6 °C)   TempSrc:   Oral Oral   SpO2: 99%   99%     Recent Results (from the past 24 hour(s))   Hemoglobin and Hematocrit    Collection Time: 05/25/22 12:01 AM   Result Value Ref Range    Hemoglobin 10.6 (L) 11.9 - 15.1 g/dL    Hematocrit 30.1 (L) 36.3 - 47.1 %     POD#1 PLTCS, female  Rh+  VSS, aysmptomatic    Continue Post op care.     Junior Jones DO

## 2022-05-26 LAB — SURGICAL PATHOLOGY REPORT: NORMAL

## 2022-05-26 PROCEDURE — 99024 POSTOP FOLLOW-UP VISIT: CPT | Performed by: SPECIALIST

## 2022-05-26 PROCEDURE — 6370000000 HC RX 637 (ALT 250 FOR IP): Performed by: STUDENT IN AN ORGANIZED HEALTH CARE EDUCATION/TRAINING PROGRAM

## 2022-05-26 PROCEDURE — 1220000000 HC SEMI PRIVATE OB R&B

## 2022-05-26 RX ADMIN — CEPHALEXIN 500 MG: 500 CAPSULE ORAL at 06:23

## 2022-05-26 RX ADMIN — IBUPROFEN 600 MG: 600 TABLET, FILM COATED ORAL at 04:07

## 2022-05-26 RX ADMIN — CEPHALEXIN 500 MG: 500 CAPSULE ORAL at 20:09

## 2022-05-26 RX ADMIN — OXYCODONE 5 MG: 5 TABLET ORAL at 21:43

## 2022-05-26 RX ADMIN — ACETAMINOPHEN 1000 MG: 500 TABLET ORAL at 07:38

## 2022-05-26 RX ADMIN — IBUPROFEN 600 MG: 600 TABLET, FILM COATED ORAL at 10:24

## 2022-05-26 RX ADMIN — OXYCODONE 5 MG: 5 TABLET ORAL at 16:53

## 2022-05-26 RX ADMIN — METRONIDAZOLE 500 MG: 500 TABLET, FILM COATED ORAL at 20:09

## 2022-05-26 RX ADMIN — OXYCODONE 5 MG: 5 TABLET ORAL at 08:22

## 2022-05-26 RX ADMIN — METRONIDAZOLE 500 MG: 500 TABLET, FILM COATED ORAL at 06:23

## 2022-05-26 RX ADMIN — OXYCODONE 5 MG: 5 TABLET ORAL at 04:07

## 2022-05-26 RX ADMIN — ACETAMINOPHEN 1000 MG: 500 TABLET ORAL at 01:03

## 2022-05-26 RX ADMIN — IBUPROFEN 600 MG: 600 TABLET, FILM COATED ORAL at 23:20

## 2022-05-26 RX ADMIN — ACETAMINOPHEN 1000 MG: 500 TABLET ORAL at 13:59

## 2022-05-26 RX ADMIN — Medication 1 TABLET: at 07:39

## 2022-05-26 RX ADMIN — CEPHALEXIN 500 MG: 500 CAPSULE ORAL at 13:42

## 2022-05-26 RX ADMIN — ACETAMINOPHEN 1000 MG: 500 TABLET ORAL at 20:08

## 2022-05-26 RX ADMIN — IBUPROFEN 600 MG: 600 TABLET, FILM COATED ORAL at 16:52

## 2022-05-26 RX ADMIN — METRONIDAZOLE 500 MG: 500 TABLET, FILM COATED ORAL at 13:42

## 2022-05-26 RX ADMIN — OXYCODONE 5 MG: 5 TABLET ORAL at 12:33

## 2022-05-26 RX ADMIN — DOCUSATE SODIUM 100 MG: 100 CAPSULE ORAL at 07:39

## 2022-05-26 ASSESSMENT — PAIN DESCRIPTION - DESCRIPTORS
DESCRIPTORS: ACHING;CRAMPING
DESCRIPTORS: CRAMPING;ACHING

## 2022-05-26 ASSESSMENT — PAIN DESCRIPTION - ORIENTATION
ORIENTATION: LOWER
ORIENTATION: LOWER

## 2022-05-26 ASSESSMENT — PAIN SCALES - GENERAL
PAINLEVEL_OUTOF10: 7
PAINLEVEL_OUTOF10: 9
PAINLEVEL_OUTOF10: 7
PAINLEVEL_OUTOF10: 7
PAINLEVEL_OUTOF10: 6
PAINLEVEL_OUTOF10: 7
PAINLEVEL_OUTOF10: 6
PAINLEVEL_OUTOF10: 7
PAINLEVEL_OUTOF10: 7

## 2022-05-26 ASSESSMENT — PAIN DESCRIPTION - LOCATION
LOCATION: ABDOMEN
LOCATION: ABDOMEN

## 2022-05-26 NOTE — LACTATION NOTE
In to assist with feed, refined latch and position, mom says it is sore but feels better than before. Reviewed signs of milk transfer and encouraged to call for assistance as needed.

## 2022-05-27 VITALS
HEART RATE: 72 BPM | SYSTOLIC BLOOD PRESSURE: 119 MMHG | DIASTOLIC BLOOD PRESSURE: 72 MMHG | TEMPERATURE: 98.3 F | OXYGEN SATURATION: 97 % | RESPIRATION RATE: 18 BRPM

## 2022-05-27 PROBLEM — Z3A.39 39 WEEKS GESTATION OF PREGNANCY: Status: RESOLVED | Noted: 2022-05-22 | Resolved: 2022-05-27

## 2022-05-27 PROCEDURE — 6370000000 HC RX 637 (ALT 250 FOR IP): Performed by: STUDENT IN AN ORGANIZED HEALTH CARE EDUCATION/TRAINING PROGRAM

## 2022-05-27 PROCEDURE — 99024 POSTOP FOLLOW-UP VISIT: CPT | Performed by: SPECIALIST

## 2022-05-27 RX ADMIN — IBUPROFEN 600 MG: 600 TABLET, FILM COATED ORAL at 11:39

## 2022-05-27 RX ADMIN — ACETAMINOPHEN 1000 MG: 500 TABLET ORAL at 02:06

## 2022-05-27 RX ADMIN — OXYCODONE 5 MG: 5 TABLET ORAL at 12:46

## 2022-05-27 RX ADMIN — ACETAMINOPHEN 1000 MG: 500 TABLET ORAL at 08:07

## 2022-05-27 RX ADMIN — Medication 1 TABLET: at 08:07

## 2022-05-27 RX ADMIN — OXYCODONE 5 MG: 5 TABLET ORAL at 02:06

## 2022-05-27 RX ADMIN — IBUPROFEN 600 MG: 600 TABLET, FILM COATED ORAL at 05:28

## 2022-05-27 RX ADMIN — DOCUSATE SODIUM 100 MG: 100 CAPSULE ORAL at 08:07

## 2022-05-27 RX ADMIN — OXYCODONE 5 MG: 5 TABLET ORAL at 06:35

## 2022-05-27 ASSESSMENT — PAIN SCALES - GENERAL
PAINLEVEL_OUTOF10: 6
PAINLEVEL_OUTOF10: 7
PAINLEVEL_OUTOF10: 8
PAINLEVEL_OUTOF10: 7
PAINLEVEL_OUTOF10: 6

## 2022-05-27 ASSESSMENT — PAIN DESCRIPTION - LOCATION: LOCATION: ABDOMEN

## 2022-05-27 ASSESSMENT — PAIN DESCRIPTION - ORIENTATION: ORIENTATION: LOWER

## 2022-05-27 NOTE — PROGRESS NOTES
POST OPERATIVE DAY # 3    Elham Alvarez is a 32 y.o. female   This patient was seen and examined today. PLTCS on 22    Her pregnancy was complicated by:   Patient Active Problem List   Diagnosis    39 weeks gestation of pregnancy    gHTN (G1)    40 weeks gestation of pregnancy    PLTCS 22 F Apg 8/9 Wt 6#14       Today she is doing well without any chief complaint. Her lochia is light. She denies chest pain, shortness of breath, headache, lightheadedness, blurred vision and peripheral edema. She is bottle feeding and she denies any signs or symptoms of mastitis. She is ambulating well. She is voiding without difficulty. She currently denies S/S of postpartum depression. Flatus present. Bowel movement present. She is tolerating solids. Vital Signs:  Vitals:    22   BP: 123/84   Pulse: (!) 103   Resp: 18   Temp: 97.6 °F (36.4 °C)   SpO2: 100%         Urine Input & Output last 24hrs:   No intake or output data in the 24 hours ending 22 0237    Physical Exam:  General:  no apparent distress, alert and cooperative  Neurologic:  alert, oriented, normal speech, no focal findings or movement disorder noted  Lungs:  No increased work of breathing, good air exchange, clear to auscultation bilaterally, no crackles or wheezing  Heart:  Regular rate and rhythm, normal S1 and S2, no S3 or S4, and no murmur noted    Abdomen: abdomen soft, non-distended, non-tender  Fundus: non-tender, firm, below umbilicus  Incision: clean, dry and silver dressing in place  Extremities:  no calf tenderness, non edematous    Labs:  Lab Results   Component Value Date    WBC 9.1 2022    HGB 10.6 (L) 2022    HCT 30.1 (L) 2022    MCV 91.3 2022     2022       Assessment/Plan:  1. Elham Alvarez is a  POD # 3 s/p PLTCS   - Doing well, VSS    - Female infant in [address]   - Encourage ambulation and use of incentive spirometer   - CBC completed; Hgb 10.6  2.  Rh positive/Rubella immune  3. Bottle feeding  4. gHTN (new diagnosis)   - BP normotensive   - PreE labs wnl, P/C 0.17   - Denies any s/s of PreE   - Continue to monitor  5. Continue post-op care. Counseling Completed:  Secondary Smoke risks and Sudden Infant Death Syndrome were reviewed with recommendations. Infant sleeping, \"back to sleep\" and avoidance of co-sleeping recommendations were reviewed. Signs and Symptoms of Post Partum Depression were reviewed. The patient is to call if any occur. Signs and symptoms of Mastitis were reviewed. The patient is to call if any occur for follow up.   Discharge instructions including pelvic rest, incision care, 15 lb weight restriction, no driving with pain medicine and office follow-up were reviewed with patient     Attending Physician: Dr. Carole Reeves DO  Ob/Gyn Resident  5/27/2022, 2:37 AM

## 2022-05-27 NOTE — PROGRESS NOTES
CLINICAL PHARMACY NOTE: MEDS TO BEDS    Total # of Prescriptions Filled: 4   The following medications were delivered to the patient:  · Ondansetron ODT 4mg  · Senokot 8.6-50mg  · Oxycodone 5mg  · Ibuprofen 600mg    Additional Documentation: delivered to patient in room 747 5/27 at 11:59am. Co-pay paid with credit on [employer] ($6.45).

## 2022-06-01 ENCOUNTER — OFFICE VISIT (OUTPATIENT)
Dept: OBGYN CLINIC | Age: 27
End: 2022-06-01

## 2022-06-01 VITALS — RESPIRATION RATE: 16 BRPM | SYSTOLIC BLOOD PRESSURE: 116 MMHG | DIASTOLIC BLOOD PRESSURE: 60 MMHG

## 2022-06-01 DIAGNOSIS — Z48.01 DRESSING CHANGE OR REMOVAL, SURGICAL WOUND: Primary | ICD-10-CM

## 2022-06-01 DIAGNOSIS — Z98.891 H/O: CESAREAN SECTION: ICD-10-CM

## 2022-06-10 ENCOUNTER — POSTPARTUM VISIT (OUTPATIENT)
Dept: OBGYN CLINIC | Age: 27
End: 2022-06-10

## 2022-06-10 VITALS
BODY MASS INDEX: 32.14 KG/M2 | DIASTOLIC BLOOD PRESSURE: 68 MMHG | WEIGHT: 200 LBS | SYSTOLIC BLOOD PRESSURE: 120 MMHG | HEIGHT: 66 IN

## 2022-06-10 DIAGNOSIS — Z98.891 H/O: CESAREAN SECTION: Primary | ICD-10-CM

## 2022-06-10 PROCEDURE — 0503F POSTPARTUM CARE VISIT: CPT | Performed by: OBSTETRICS & GYNECOLOGY

## 2022-06-10 NOTE — PROGRESS NOTES
Corina Hebert  1:50 PM  6/10/22      The patient was seen. She has no chief complaints today. She delivered by  section on 22. She is not breast feeding and there is not any signs or symptoms of mastitis. The patient completed the E.P.D.S. Evaluation form and scored 8. She does not have any signs or symptoms of post partum depression. She denies any suicidal thoughts with a plan, intent to harm others and delusional ideas. Today her lochia is light she denies any dizziness or shortness of breath. Her pregnancy was complicated by:   Patient Active Problem List    Diagnosis Date Noted    gHTN (G1) 2022     Priority: Medium    40 weeks gestation of pregnancy 2022     Priority: Medium    PLTCS 22 F Apg 8/9 Wt 6#14 2022     Priority: Medium         She does admit to having good home support. Her bowels are regular and she denies any urinary tract symptomology. OB History    Para Term  AB Living   1 1 1 0 0 1   SAB IAB Ectopic Molar Multiple Live Births   0 0 0 0 0 1           Blood pressure 120/68, height 5' 6\" (1.676 m), weight 200 lb (90.7 kg), last menstrual period 2021, not currently breastfeeding. Abdomen: Soft and non-tender; good bowel sounds; no guarding, rebound or rigidity; no CVA tenderness bilaterally. Incision: Clean, Dry and Intact without signs or symptoms of infection. Extremities: No calf tenderness bilaterally. DTR 2/4 bilaterally. No edema. Assessment:   Diagnosis Orders   1. PLTCS 22 F Apg 8/9 Wt 6#14       Chief Complaint   Patient presents with    Postpartum Care     EPDS Score of 8        Plan:  1. Return to the office in  3-4 weeks  2. Signs & Symptoms of mastitis reviewed; notify if occurs  3. Secondary smoke risks reviewed. Increased risks of respiratory problems, Sudden     infant death syndrome, and potential malignancies. 4. Abstinence  5.  Family planning counseling and STD counseling completed - NFP  6. Continue with post operative restrictions  7. No lifting or [place]  8. Patient was seen with total face to face time of 20 minutes. More than 50% of this visit was on counseling and education regarding her    Diagnosis Orders   1. PLTCS 5/24/22 F Apg 8/9 Wt 6#14      and her options. She was also counseled on her preventative health maintenance recommendations and follow-up.

## 2022-06-30 ENCOUNTER — POSTPARTUM VISIT (OUTPATIENT)
Dept: OBGYN CLINIC | Age: 27
End: 2022-06-30

## 2022-06-30 VITALS
HEIGHT: 66 IN | WEIGHT: 203 LBS | SYSTOLIC BLOOD PRESSURE: 120 MMHG | BODY MASS INDEX: 32.62 KG/M2 | DIASTOLIC BLOOD PRESSURE: 72 MMHG

## 2022-06-30 DIAGNOSIS — Z98.891 H/O: CESAREAN SECTION: ICD-10-CM

## 2022-06-30 PROBLEM — Z3A.40 40 WEEKS GESTATION OF PREGNANCY: Status: RESOLVED | Noted: 2022-05-24 | Resolved: 2022-06-30

## 2022-06-30 PROCEDURE — 0503F POSTPARTUM CARE VISIT: CPT | Performed by: ADVANCED PRACTICE MIDWIFE

## 2022-06-30 ASSESSMENT — PATIENT HEALTH QUESTIONNAIRE - PHQ9
SUM OF ALL RESPONSES TO PHQ QUESTIONS 1-9: 0
SUM OF ALL RESPONSES TO PHQ9 QUESTIONS 1 & 2: 0
SUM OF ALL RESPONSES TO PHQ QUESTIONS 1-9: 0
2. FEELING DOWN, DEPRESSED OR HOPELESS: 0
1. LITTLE INTEREST OR PLEASURE IN DOING THINGS: 0
SUM OF ALL RESPONSES TO PHQ QUESTIONS 1-9: 0
SUM OF ALL RESPONSES TO PHQ QUESTIONS 1-9: 0

## 2022-06-30 NOTE — PROGRESS NOTES
[address] OB/GYN [facility]  [place]  [address] [address]  Dept: [phone]    Patient Name: Terri Rosenberg  Patient Age: 32 y.o. Date of Visit: 2022    Chief Complaint   Patient presents with    Postpartum Care       HPI:  DELIVERY DATE: 22  TYPE OF DELIVERY: primary  section  PROVIDER:   PERINEUM: intact    Parminder Key was seen for her 6 week visit. Her Female infant is healthy. She is bottle feeding. She is not experiencing pain. She is rating her pain 0  She reports her lochia is no bleeding  She reports none perineal discomfort. She denies urinary incontinence. Her bowels have returned to her normal pattern. She is back to her normal activity pattern. She has not her first menses. Parminder Key has not engaged in intercourse. She does not report a mood disorder. She feels she is not having difficulty coping. Parminder Key feels her family adjustment is effective. She is still coping with her birth experience.   In the past 7 days:  I have been able to laugh and see the funny side of things: As much as I always could  I have looked forward with enjoyment to things: As much as I ever did  I have blamed myself unnecessarily when things went wrong: No, never  I have been anxious or worried for no good reason: No, not at all  I have felt scared or panicky for no good reason: No, not at all  I haven't been able to cope lately: No, I have been coping as well as ever  I have been so unhappy that I have had difficulty sleeping: Not at all  I have felt sad or miserable: No, not at all  I have been so unhappy that I have been crying: No, never  The thought of harming myself has occurred to me: Never  Total: 0      .  OBJECTIVE:   Wt Readings from Last 3 Encounters:   22 203 lb (92.1 kg)   06/10/22 200 lb (90.7 kg)   22 226 lb 8 oz (102.7 kg)       Blood pressure 120/72, height 5' 6\" (1.676 m), weight 203 lb (92.1 kg), last menstrual period 08/16/2021, not currently breastfeeding. Breast exam: deferred    Abdomen: Soft non-tender without guarding. There is diastasis present. The diastasis is 1 finger breaths of separation. Incision scar visualized no open areas healing well     Perineum: not inspected    Extremities: No calf tenderness bilaterally. No edema. ASSESSMENT/PLAN:    6 week postpartum visit  · She will return for annual  · Labs were not ordered. · Date of last pap: 2021  · Reviewed recommendations for patient to reach prepregnancy weight by 6-12 months postpartum with goal to be a normal BMI (18.5-24.9)  bottle feeding  · Signs & Symptoms of mastitis reviewed; notify if occurs  · Reviewed benefits of longer duration of breastfeeding is associated with improved maternal health, including lower risk of diabetes, hypertension, myocardial infarction, ovarian cancer, and breast cancer. Smoker/non-smoker:   Secondary smoke risks were reviewed, including increased risks of respiratory problems, Sudden Infant Death Syndrome, and potential malignancies.  Smoking cessation counseling:  n/a  Family planning needs  · Family planning counseling was completed. · Patient was advices to avoid interpregnancy intervals shorter than 6 months. Reviewed recommendations for repeat pregnancy after 18 months. · Natural family planning          Patient was seen with total face to face time of 15 minutes.   More than 50% of this visit was counseling and education regarding her Post Partum visit

## 2022-07-03 NOTE — CARE COORDINATION
ANTEPARTUM NOTE    39 weeks gestation of pregnancy [Z3A.39]    Lore Griffith was admitted to L&D on 5/22/2022 for IOL @ 39 6/7 weeks    OB GYN Provider: Dr. Ashley Loza    Will meet with patient after delivery to verify name/address/phone/insurance and discuss discharge planning. Anticipate DC home 2 nights after vaginal delivery or 4 nights after C/S delivery as long as hemodynamically stable.
CASE MANAGEMENT POST-PARTUM TRANSITIONAL CARE PLAN    39 weeks gestation of pregnancy [Z3A.39]  40 weeks gestation of pregnancy [Z3A.40]    OB Provider: Dr. Willian Fleischer met w/ Gray Dickinson at bedside to discuss DCP. She is S/P CS on 2022 @ 40w1d at 80 of female     Writer verified name/address/phone number correct on facesheet. She states she lives with her . [employer] correct. Writer notified Gray Dickinson she has 30 days from date of birth to add  to insurance policy. She verbalized understanding. Gray Dickinson confirmed a safe place for infant to sleep at home. Infant name on BC: Eduardo Jordan. Infant PCP Aria Pierre.      DME: none  HOME CARE: none    Anticipate DC of couplet 2022    Readmission Risk              Risk of Unplanned Readmission:  5
Social Work     Sw reviewed medical record (current active problem list) and tox screens and found no concerns. Sw spoke with mom briefly to explain Sw role, inquire if any needs or concerns, and provide safe sleep education and discuss. Mom denied any needs or questions and informs baby has a safe sleep environment (claudio, marquise and saroj). Mom denied any current s/s of anxiety or depression and is aware to reach out to OB if any s/s occur after dc. Mom reports a great support system that includes her  and both sides of family and denied any current questions or needs. Mom reports this is their first child and states ped will be [facility]. Sw encouraged mom to reach out if any issues or concerns arise.
Statement Selected

## 2023-11-09 ENCOUNTER — HOSPITAL ENCOUNTER (OUTPATIENT)
Age: 28
Setting detail: SPECIMEN
Discharge: HOME | End: 2023-11-09

## 2023-11-09 ENCOUNTER — OFFICE VISIT (OUTPATIENT)
Dept: OBGYN CLINIC | Age: 28
End: 2023-11-09
Payer: COMMERCIAL

## 2023-11-09 VITALS
HEIGHT: 66 IN | WEIGHT: 216 LBS | SYSTOLIC BLOOD PRESSURE: 120 MMHG | BODY MASS INDEX: 34.72 KG/M2 | DIASTOLIC BLOOD PRESSURE: 82 MMHG

## 2023-11-09 DIAGNOSIS — N89.8 VAGINAL DISCHARGE: ICD-10-CM

## 2023-11-09 DIAGNOSIS — R30.0 DYSURIA: Primary | ICD-10-CM

## 2023-11-09 DIAGNOSIS — N89.8 VAGINAL ITCHING: ICD-10-CM

## 2023-11-09 DIAGNOSIS — N94.9 VAGINAL BURNING: ICD-10-CM

## 2023-11-09 DIAGNOSIS — R30.0 DYSURIA: ICD-10-CM

## 2023-11-09 LAB
CANDIDA SPECIES: NEGATIVE
GARDNERELLA VAGINALIS: NEGATIVE
SOURCE: NORMAL
TRICHOMONAS: NEGATIVE

## 2023-11-09 PROCEDURE — 99214 OFFICE O/P EST MOD 30 MIN: CPT | Performed by: NURSE PRACTITIONER

## 2023-11-09 RX ORDER — NITROFURANTOIN 25; 75 MG/1; MG/1
100 CAPSULE ORAL 2 TIMES DAILY
Qty: 14 CAPSULE | Refills: 0 | Status: SHIPPED | OUTPATIENT
Start: 2023-11-09 | End: 2023-11-16

## 2023-11-09 SDOH — ECONOMIC STABILITY: HOUSING INSECURITY
IN THE LAST 12 MONTHS, WAS THERE A TIME WHEN YOU DID NOT HAVE A STEADY PLACE TO SLEEP OR SLEPT IN A SHELTER (INCLUDING NOW)?: NO

## 2023-11-09 SDOH — ECONOMIC STABILITY: FOOD INSECURITY: WITHIN THE PAST 12 MONTHS, YOU WORRIED THAT YOUR FOOD WOULD RUN OUT BEFORE YOU GOT MONEY TO BUY MORE.: NEVER TRUE

## 2023-11-09 SDOH — ECONOMIC STABILITY: FOOD INSECURITY: WITHIN THE PAST 12 MONTHS, THE FOOD YOU BOUGHT JUST DIDN'T LAST AND YOU DIDN'T HAVE MONEY TO GET MORE.: NEVER TRUE

## 2023-11-09 SDOH — ECONOMIC STABILITY: INCOME INSECURITY: HOW HARD IS IT FOR YOU TO PAY FOR THE VERY BASICS LIKE FOOD, HOUSING, MEDICAL CARE, AND HEATING?: NOT HARD AT ALL

## 2023-11-09 ASSESSMENT — PATIENT HEALTH QUESTIONNAIRE - PHQ9
SUM OF ALL RESPONSES TO PHQ QUESTIONS 1-9: 0
SUM OF ALL RESPONSES TO PHQ QUESTIONS 1-9: 0
1. LITTLE INTEREST OR PLEASURE IN DOING THINGS: 0
SUM OF ALL RESPONSES TO PHQ9 QUESTIONS 1 & 2: 0
SUM OF ALL RESPONSES TO PHQ QUESTIONS 1-9: 0
2. FEELING DOWN, DEPRESSED OR HOPELESS: 0
SUM OF ALL RESPONSES TO PHQ QUESTIONS 1-9: 0

## 2023-11-09 ASSESSMENT — ENCOUNTER SYMPTOMS
VOMITING: 0
NAUSEA: 0
COLOR CHANGE: 0
COUGH: 0
SHORTNESS OF BREATH: 0

## 2023-11-09 NOTE — PROGRESS NOTES
[address] [facility],[address] OB/GYN [facility]  [address]  [address]   Dept: [phone]  Dept Fax: [phone]    Agustin Ham is a 29 y.o. female who presents today for her medical conditions/complaintsas noted below. Agustin Ham is c/o of Vaginal Itching, Vaginal Discharge, and Urinary Burning        HPI:     HPI  Pt is here with complaints of noting pain after urinating that started 1 week ago then couple days later started noted vaginal burning and itching also and some increased vaginal discharge. She then 4 days ago started OTC AZO yeast medication and states burning got a little bit better but still noting especially after urinating. Denies vaginal  Odor, or genital lesions. No hematuria, urinary frequency, flank pain,  No pelvic pain, fevers, chills, nausea/vomiting. No recent antibiotics, changes in soaps. No change in partners  LMP- 10/20/23      History reviewed. No pertinent past medical history. Past Surgical History:   Procedure Laterality Date     SECTION N/A 2022     SECTION performed by Priti Hernandez MD at [facility] L&D OR       History reviewed. No pertinent family history. Social History     Tobacco Use    Smoking status: Never    Smokeless tobacco: Never   Substance Use Topics    Alcohol use: Not Currently      Current Outpatient Medications   Medication Sig Dispense Refill    nitrofurantoin, macrocrystal-monohydrate, (MACROBID) 100 MG capsule Take 1 capsule by mouth 2 times daily for 7 days 14 capsule 0     No current facility-administered medications for this visit.      Allergies   Allergen Reactions    Latex        Health Maintenance   Topic Date Due    COVID-19 Vaccine (1) Never done    Depression Screen  2023    Flu vaccine (1) Never done    Pap smear  10/25/2024    DTaP/Tdap/Td vaccine (8 - Td or Tdap) 2032    Hepatitis B vaccine  Completed    Hib vaccine  Completed    HPV

## 2023-11-11 LAB
MICROORGANISM SPEC CULT: ABNORMAL
SPECIMEN DESCRIPTION: ABNORMAL

## 2024-02-26 ENCOUNTER — TELEPHONE (OUTPATIENT)
Dept: OBGYN CLINIC | Age: 29
End: 2024-02-26

## 2024-02-26 NOTE — TELEPHONE ENCOUNTER
Amarilis Moreno calling reporting a positive pregnancy test.    Amarilis Moreno is not a new patient.     This is not Amarilis Moreno's first pregnancy.    ** Please complete if patient has previous birth history, please delete is not applicable**  Number of pregnancies: 1  Mode of delivery (vaginal//both) CSECTION  If NEW patient please instruct patient will need prior OBGYN records.     mAarilis Moreno last menstrual cycle: 24  Based on LMP patient is 5W6D weeks     Dose patient have any concerns?   []YES  [x]NO  If yes, please discuss concern with provider to determine if patient needs additional appointment.      Scheduling Instructions and Education    Please document appointments scheduled during phone call   Missed menses date/provider: 3/20 1:30 HERRERA PALACION/IPV date:USN 3/27 1:45, IPV 2:30    Please forward telephone encounter to provider appointments are scheduled with prior to closing telephone encounter.

## 2024-03-20 ENCOUNTER — OFFICE VISIT (OUTPATIENT)
Dept: OBGYN CLINIC | Age: 29
End: 2024-03-20
Payer: COMMERCIAL

## 2024-03-20 ENCOUNTER — HOSPITAL ENCOUNTER (OUTPATIENT)
Age: 29
Setting detail: SPECIMEN
Discharge: HOME | End: 2024-03-20

## 2024-03-20 VITALS
SYSTOLIC BLOOD PRESSURE: 122 MMHG | HEIGHT: 66 IN | WEIGHT: 216 LBS | BODY MASS INDEX: 34.72 KG/M2 | DIASTOLIC BLOOD PRESSURE: 78 MMHG

## 2024-03-20 DIAGNOSIS — E66.9 OBESITY, UNSPECIFIED CLASSIFICATION, UNSPECIFIED OBESITY TYPE, UNSPECIFIED WHETHER SERIOUS COMORBIDITY PRESENT: ICD-10-CM

## 2024-03-20 DIAGNOSIS — N91.2 AMENORRHEA: Primary | ICD-10-CM

## 2024-03-20 DIAGNOSIS — N92.6 MISSED MENSES: ICD-10-CM

## 2024-03-20 DIAGNOSIS — Z98.891 HISTORY OF CESAREAN SECTION: ICD-10-CM

## 2024-03-20 LAB
BILIRUB UR QL STRIP: NEGATIVE
CLARITY UR: CLEAR
COLOR UR: YELLOW
COMMENT: ABNORMAL
CONTROL: ABNORMAL
GLUCOSE UR STRIP-MCNC: NEGATIVE MG/DL
HGB UR QL STRIP.AUTO: NEGATIVE
KETONES UR STRIP-MCNC: NEGATIVE MG/DL
LEUKOCYTE ESTERASE UR QL STRIP: NEGATIVE
NITRITE UR QL STRIP: NEGATIVE
PH UR STRIP: 6.5 [PH] (ref 5–8)
PREGNANCY TEST URINE, POC: POSITIVE
PROT UR STRIP-MCNC: NEGATIVE MG/DL
SP GR UR STRIP: 1 (ref 1–1.03)
UROBILINOGEN UR STRIP-ACNC: NORMAL EU/DL (ref 0–1)

## 2024-03-20 PROCEDURE — 81025 URINE PREGNANCY TEST: CPT | Performed by: NURSE PRACTITIONER

## 2024-03-20 PROCEDURE — 99214 OFFICE O/P EST MOD 30 MIN: CPT | Performed by: NURSE PRACTITIONER

## 2024-03-20 SDOH — ECONOMIC STABILITY: INCOME INSECURITY: HOW HARD IS IT FOR YOU TO PAY FOR THE VERY BASICS LIKE FOOD, HOUSING, MEDICAL CARE, AND HEATING?: NOT HARD AT ALL

## 2024-03-20 SDOH — ECONOMIC STABILITY: FOOD INSECURITY: WITHIN THE PAST 12 MONTHS, THE FOOD YOU BOUGHT JUST DIDN'T LAST AND YOU DIDN'T HAVE MONEY TO GET MORE.: NEVER TRUE

## 2024-03-20 SDOH — ECONOMIC STABILITY: FOOD INSECURITY: WITHIN THE PAST 12 MONTHS, YOU WORRIED THAT YOUR FOOD WOULD RUN OUT BEFORE YOU GOT MONEY TO BUY MORE.: NEVER TRUE

## 2024-03-20 ASSESSMENT — PATIENT HEALTH QUESTIONNAIRE - PHQ9
SUM OF ALL RESPONSES TO PHQ QUESTIONS 1-9: 0
SUM OF ALL RESPONSES TO PHQ QUESTIONS 1-9: 0
1. LITTLE INTEREST OR PLEASURE IN DOING THINGS: NOT AT ALL
2. FEELING DOWN, DEPRESSED OR HOPELESS: NOT AT ALL
SUM OF ALL RESPONSES TO PHQ9 QUESTIONS 1 & 2: 0
SUM OF ALL RESPONSES TO PHQ QUESTIONS 1-9: 0
SUM OF ALL RESPONSES TO PHQ9 QUESTIONS 1 & 2: 0
1. LITTLE INTEREST OR PLEASURE IN DOING THINGS: NOT AT ALL
SUM OF ALL RESPONSES TO PHQ QUESTIONS 1-9: 0
2. FEELING DOWN, DEPRESSED OR HOPELESS: NOT AT ALL

## 2024-03-20 ASSESSMENT — ANXIETY QUESTIONNAIRES
GAD7 TOTAL SCORE: 1
IF YOU CHECKED OFF ANY PROBLEMS ON THIS QUESTIONNAIRE, HOW DIFFICULT HAVE THESE PROBLEMS MADE IT FOR YOU TO DO YOUR WORK, TAKE CARE OF THINGS AT HOME, OR GET ALONG WITH OTHER PEOPLE: NOT DIFFICULT AT ALL
1. FEELING NERVOUS, ANXIOUS, OR ON EDGE: NOT AT ALL
4. TROUBLE RELAXING: NOT AT ALL
6. BECOMING EASILY ANNOYED OR IRRITABLE: SEVERAL DAYS
5. BEING SO RESTLESS THAT IT IS HARD TO SIT STILL: NOT AT ALL
2. NOT BEING ABLE TO STOP OR CONTROL WORRYING: NOT AT ALL
3. WORRYING TOO MUCH ABOUT DIFFERENT THINGS: NOT AT ALL
7. FEELING AFRAID AS IF SOMETHING AWFUL MIGHT HAPPEN: NOT AT ALL

## 2024-03-20 ASSESSMENT — SOCIAL DETERMINANTS OF HEALTH (SDOH)
WITHIN THE LAST YEAR, HAVE YOU BEEN HUMILIATED OR EMOTIONALLY ABUSED IN OTHER WAYS BY YOUR PARTNER OR EX-PARTNER?: NO
WITHIN THE LAST YEAR, HAVE YOU BEEN AFRAID OF YOUR PARTNER OR EX-PARTNER?: NO
WITHIN THE LAST YEAR, HAVE TO BEEN RAPED OR FORCED TO HAVE ANY KIND OF SEXUAL ACTIVITY BY YOUR PARTNER OR EX-PARTNER?: NO
WITHIN THE LAST YEAR, HAVE YOU BEEN KICKED, HIT, SLAPPED, OR OTHERWISE PHYSICALLY HURT BY YOUR PARTNER OR EX-PARTNER?: NO

## 2024-03-20 NOTE — PROGRESS NOTES
pregnancy    Mild NVP: 6 or less  Moderate NVP: 7-12  Severe NVP: 13 or more  (Adapted from ACOG Practice Bulletin 189)    In the past 7 days:  I have been able to laugh and see the funny side of things: As much as I always could  I have looked forward with enjoyment to things: As much as I ever did  I have blamed myself unnecessarily when things went wrong: No, never  I have been anxious or worried for no good reason: Hardly ever  I have felt scared or panicky for no good reason: No, not at all  I haven't been able to cope lately: No, most of the time I have coped quite well  I have been so unhappy that I have had difficulty sleeping: Not at all  I have felt sad or miserable: No, not at all  I have been so unhappy that I have been crying: No, never  The thought of harming myself has occurred to me: Never  Total: 2         3/20/2024     2:02 PM   RICK-7 SCREENING   Feeling nervous, anxious, or on edge Not at all   Not being able to stop or control worrying Not at all   Worrying too much about different things Not at all   Trouble relaxing Not at all   Being so restless that it is hard to sit still Not at all   Becoming easily annoyed or irritable Several days   Feeling afraid as if something awful might happen Not at all   RICK-7 Total Score 1   How difficult have these problems made it for you to do your work, take care of things at home, or get along with other people? Not difficult at all     Interpretation of RICK-7 score: 5-9 = mild anxiety, 10-14 = moderate anxiety, 15+ = severe anxiety. Recommend referral to behavioral health for scores 10 or greater.

## 2024-03-20 NOTE — PROGRESS NOTES
Obstetricians and Gynecologists.   Patient was given handouts with decision by OB History visit   - Reviewed routine tests done at new OB visit and patient verbally consents to urine drug screen and HIV screening at New OB Visit  - 1 hour glucola needed at OB history: Yes  She denies tobacco use. The patient was counseled on the risks of tobacco abuse, both maternal and fetal. She was instructed to stop smoking if currently using tobacco. Morbidity, mortality, and cessation programs were reviewed. The risks include but are not limited to increased risks of  labor,  delivery, premature rupture of membranes, intrauterine growth restriction, intrauterine fetal demise and abruptio placenta. Secondary smoke risks were also reviewed. Increases in cancer, respiratory problems, and sudden infant death syndrome were reviewed as well.  - She was instructed to call with any concerns or worsening of any symptoms  - She will return for New OB intake visit  High Risk Factors Identified for this pregnancy:   Obesity  -pregravidum BMI34.86  -recommend early 1hr GTT  - recommend ASA 81mg after 12 weeks : yes  - testing indication per ACOG starting at ( increased risk stillbirth):   37 weeks GA: - Prepregnancy BMI 35-39/9  34 weeks GA:- Pre pregnancy BMI >40    Previous high risk obstetrical history:   Hx   -X1 in MAy 2022 d/t failure to progress  - unsure if wants repeat c/s vs TOLAC  Hx gHTN      Patient was seen with total face to face time of 30 minutes. More than 50% of this visit was spent face to face coordinating plan of care and answering questions regarding     Diagnosis Orders   1. Amenorrhea        2. Missed menses  POCT urine pregnancy    Culture, Urine    Urinalysis      3. Obesity, unspecified classification, unspecified obesity type, unspecified whether serious comorbidity present        4. History of  section         . She was also counseled on her preventative health

## 2024-03-21 LAB
MICROORGANISM SPEC CULT: NORMAL
SPECIMEN DESCRIPTION: NORMAL

## 2024-03-27 ENCOUNTER — HOSPITAL ENCOUNTER (OUTPATIENT)
Age: 29
Setting detail: SPECIMEN
Discharge: HOME | End: 2024-03-27

## 2024-03-27 ENCOUNTER — INITIAL PRENATAL (OUTPATIENT)
Dept: OBGYN CLINIC | Age: 29
End: 2024-03-27

## 2024-03-27 VITALS
DIASTOLIC BLOOD PRESSURE: 82 MMHG | SYSTOLIC BLOOD PRESSURE: 114 MMHG | HEART RATE: 75 BPM | WEIGHT: 216.2 LBS | BODY MASS INDEX: 34.9 KG/M2

## 2024-03-27 DIAGNOSIS — Z3A.10 10 WEEKS GESTATION OF PREGNANCY: ICD-10-CM

## 2024-03-27 DIAGNOSIS — Z3A.10 10 WEEKS GESTATION OF PREGNANCY: Primary | ICD-10-CM

## 2024-03-27 PROCEDURE — 0500F INITIAL PRENATAL CARE VISIT: CPT | Performed by: NURSE PRACTITIONER

## 2024-03-27 RX ORDER — DOCOSAHEXAENOIC ACID 200 MG
CAPSULE ORAL
COMMUNITY

## 2024-03-27 RX ORDER — ASPIRIN 81 MG/1
81 TABLET ORAL DAILY
Qty: 90 TABLET | Refills: 1 | Status: SHIPPED | OUTPATIENT
Start: 2024-03-27

## 2024-03-27 SDOH — ECONOMIC STABILITY: FOOD INSECURITY: WITHIN THE PAST 12 MONTHS, THE FOOD YOU BOUGHT JUST DIDN'T LAST AND YOU DIDN'T HAVE MONEY TO GET MORE.: NEVER TRUE

## 2024-03-27 SDOH — ECONOMIC STABILITY: FOOD INSECURITY: WITHIN THE PAST 12 MONTHS, YOU WORRIED THAT YOUR FOOD WOULD RUN OUT BEFORE YOU GOT MONEY TO BUY MORE.: NEVER TRUE

## 2024-03-27 SDOH — ECONOMIC STABILITY: TRANSPORTATION INSECURITY
IN THE PAST 12 MONTHS, HAS THE LACK OF TRANSPORTATION KEPT YOU FROM MEDICAL APPOINTMENTS OR FROM GETTING MEDICATIONS?: NO

## 2024-03-27 SDOH — ECONOMIC STABILITY: TRANSPORTATION INSECURITY
IN THE PAST 12 MONTHS, HAS LACK OF TRANSPORTATION KEPT YOU FROM MEETINGS, WORK, OR FROM GETTING THINGS NEEDED FOR DAILY LIVING?: NO

## 2024-03-27 SDOH — ECONOMIC STABILITY: INCOME INSECURITY: IN THE LAST 12 MONTHS, WAS THERE A TIME WHEN YOU WERE NOT ABLE TO PAY THE MORTGAGE OR RENT ON TIME?: NO

## 2024-03-27 SDOH — ECONOMIC STABILITY: HOUSING INSECURITY: IN THE LAST 12 MONTHS, HOW MANY PLACES HAVE YOU LIVED?: 1

## 2024-03-27 ASSESSMENT — ANXIETY QUESTIONNAIRES
4. TROUBLE RELAXING: NOT AT ALL
5. BEING SO RESTLESS THAT IT IS HARD TO SIT STILL: NOT AT ALL
1. FEELING NERVOUS, ANXIOUS, OR ON EDGE: NOT AT ALL
6. BECOMING EASILY ANNOYED OR IRRITABLE: NOT AT ALL
7. FEELING AFRAID AS IF SOMETHING AWFUL MIGHT HAPPEN: NOT AT ALL
GAD7 TOTAL SCORE: 0
IF YOU CHECKED OFF ANY PROBLEMS ON THIS QUESTIONNAIRE, HOW DIFFICULT HAVE THESE PROBLEMS MADE IT FOR YOU TO DO YOUR WORK, TAKE CARE OF THINGS AT HOME, OR GET ALONG WITH OTHER PEOPLE: NOT DIFFICULT AT ALL
2. NOT BEING ABLE TO STOP OR CONTROL WORRYING: NOT AT ALL
3. WORRYING TOO MUCH ABOUT DIFFERENT THINGS: NOT AT ALL

## 2024-03-27 ASSESSMENT — SOCIAL DETERMINANTS OF HEALTH (SDOH)
HOW HARD IS IT FOR YOU TO PAY FOR THE VERY BASICS LIKE FOOD, HOUSING, MEDICAL CARE, AND HEATING?: NOT HARD AT ALL
WITHIN THE LAST YEAR, HAVE YOU BEEN KICKED, HIT, SLAPPED, OR OTHERWISE PHYSICALLY HURT BY YOUR PARTNER OR EX-PARTNER?: NO
WITHIN THE LAST YEAR, HAVE YOU BEEN AFRAID OF YOUR PARTNER OR EX-PARTNER?: NO
WITHIN THE LAST YEAR, HAVE YOU BEEN HUMILIATED OR EMOTIONALLY ABUSED IN OTHER WAYS BY YOUR PARTNER OR EX-PARTNER?: NO
WITHIN THE LAST YEAR, HAVE TO BEEN RAPED OR FORCED TO HAVE ANY KIND OF SEXUAL ACTIVITY BY YOUR PARTNER OR EX-PARTNER?: NO

## 2024-03-27 NOTE — PROGRESS NOTES
New Obstetric Intake     Patient Name:Amarilis Moreno  Patient Age: 28 y.o.  Date of Visit: 3/27/2024  Patient's estimated gestational age at visit: 10 W1D  Estimated Date of Delivery: 10/22/2024    Subjective:    Any Concerns Today: no    OB History          2    Para   1    Term   1            AB        Living   1         SAB        IAB        Ectopic        Molar        Multiple   0    Live Births   1                Rutherford Score:   Postpartum Depression: Not on file (2022)      History of postpartum depression: no    Generalized Anxiety Screening:      3/20/2024     2:02 PM   RICK 7 SCORE   RICK-7 Total Score 1     Interpretation of RICK-7 score: 5-9 = mild anxiety, 10-14 = moderate anxiety, 15+ = severe anxiety. Recommend referral to behavioral health for scores 10 or greater.     Social Determinants of Health:   Financial Resource Strain: Low Risk  (3/27/2024)    Overall Financial Resource Strain (CARDIA)     Difficulty of Paying Living Expenses: Not hard at all      Food Insecurity: No Food Insecurity (3/27/2024)    Hunger Vital Sign     Worried About Running Out of Food in the Last Year: Never true     Ran Out of Food in the Last Year: Never true      Transportation Needs: No Transportation Needs (3/27/2024)    PRAPARE - Transportation     Lack of Transportation (Medical): No     Lack of Transportation (Non-Medical): No      Intimate Partner Violence: Not At Risk (3/27/2024)    Humiliation, Afraid, Rape, and Kick questionnaire     Fear of Current or Ex-Partner: No     Emotionally Abused: No     Physically Abused: No     Sexually Abused: No       Objective:      /82   Pulse 75   Wt 98.1 kg (216 lb 3.2 oz)   LMP 2024 (Exact Date)      Medications:  Current Outpatient Medications   Medication Sig Dispense Refill    Docosahexaenoic Acid (PRENATAL DHA) 200 MG CAPS Take by mouth       No current facility-administered medications for this visit.     Allergies   Allergen Reactions

## 2024-03-28 LAB
CHLAMYDIA DNA UR QL NAA+PROBE: NEGATIVE
N GONORRHOEA DNA UR QL NAA+PROBE: NEGATIVE
SPECIMEN DESCRIPTION: NORMAL

## 2024-04-18 ENCOUNTER — ROUTINE PRENATAL (OUTPATIENT)
Dept: PERINATAL CARE | Age: 29
End: 2024-04-18
Payer: COMMERCIAL

## 2024-04-18 ENCOUNTER — HOSPITAL ENCOUNTER (OUTPATIENT)
Age: 29
Discharge: HOME | End: 2024-04-18
Payer: COMMERCIAL

## 2024-04-18 VITALS
RESPIRATION RATE: 16 BRPM | HEIGHT: 66 IN | WEIGHT: 218.26 LBS | SYSTOLIC BLOOD PRESSURE: 126 MMHG | BODY MASS INDEX: 35.08 KG/M2 | TEMPERATURE: 99.3 F | DIASTOLIC BLOOD PRESSURE: 76 MMHG | HEART RATE: 84 BPM

## 2024-04-18 DIAGNOSIS — O99.211 OBESITY AFFECTING PREGNANCY IN FIRST TRIMESTER, UNSPECIFIED OBESITY TYPE: ICD-10-CM

## 2024-04-18 DIAGNOSIS — O36.80X0 ENCOUNTER TO DETERMINE FETAL VIABILITY OF PREGNANCY, SINGLE OR UNSPECIFIED FETUS: ICD-10-CM

## 2024-04-18 DIAGNOSIS — Z36.9 FIRST TRIMESTER SCREENING: Primary | ICD-10-CM

## 2024-04-18 DIAGNOSIS — Z87.59 HISTORY OF GESTATIONAL HYPERTENSION: ICD-10-CM

## 2024-04-18 DIAGNOSIS — Z3A.13 13 WEEKS GESTATION OF PREGNANCY: ICD-10-CM

## 2024-04-18 PROCEDURE — 76813 OB US NUCHAL MEAS 1 GEST: CPT | Performed by: OBSTETRICS & GYNECOLOGY

## 2024-04-18 PROCEDURE — 81508 FTL CGEN ABNOR TWO PROTEINS: CPT

## 2024-04-18 PROCEDURE — 76801 OB US < 14 WKS SINGLE FETUS: CPT | Performed by: OBSTETRICS & GYNECOLOGY

## 2024-04-18 PROCEDURE — 36415 COLL VENOUS BLD VENIPUNCTURE: CPT

## 2024-04-19 ENCOUNTER — HOSPITAL ENCOUNTER (OUTPATIENT)
Age: 29
Setting detail: SPECIMEN
Discharge: HOME | End: 2024-04-19

## 2024-04-19 DIAGNOSIS — Z3A.10 10 WEEKS GESTATION OF PREGNANCY: ICD-10-CM

## 2024-04-19 LAB
ABO + RH BLD: NORMAL
AMPHET UR QL SCN: NEGATIVE
BARBITURATES UR QL SCN: NEGATIVE
BASOPHILS # BLD: <0.03 K/UL (ref 0–0.2)
BASOPHILS NFR BLD: 0 % (ref 0–2)
BENZODIAZ UR QL: NEGATIVE
BLOOD GROUP ANTIBODIES SERPL: NEGATIVE
CANNABINOIDS UR QL SCN: NEGATIVE
COCAINE UR QL SCN: NEGATIVE
EOSINOPHIL # BLD: 0.04 K/UL (ref 0–0.44)
EOSINOPHILS RELATIVE PERCENT: 1 % (ref 1–4)
ERYTHROCYTE [DISTWIDTH] IN BLOOD BY AUTOMATED COUNT: 13.3 % (ref 11.8–14.4)
FENTANYL UR QL: NEGATIVE
GLUCOSE 1H P 50 G GLC PO SERPL-MCNC: 162 MG/DL (ref 70–135)
GLUCOSE ADMINISTRATION: ABNORMAL
HBV SURFACE AG SERPL QL IA: NONREACTIVE
HCT VFR BLD AUTO: 36.8 % (ref 36.3–47.1)
HCV AB SERPL QL IA: NONREACTIVE
HGB BLD-MCNC: 12.2 G/DL (ref 11.9–15.1)
HIV 1+2 AB+HIV1 P24 AG SERPL QL IA: NONREACTIVE
IMM GRANULOCYTES # BLD AUTO: <0.03 K/UL (ref 0–0.3)
IMM GRANULOCYTES NFR BLD: 0 %
LYMPHOCYTES NFR BLD: 1.19 K/UL (ref 1.1–3.7)
LYMPHOCYTES RELATIVE PERCENT: 20 % (ref 24–43)
MCH RBC QN AUTO: 30.4 PG (ref 25.2–33.5)
MCHC RBC AUTO-ENTMCNC: 33.2 G/DL (ref 28.4–34.8)
MCV RBC AUTO: 91.8 FL (ref 82.6–102.9)
METHADONE UR QL: NEGATIVE
MONOCYTES NFR BLD: 0.34 K/UL (ref 0.1–1.2)
MONOCYTES NFR BLD: 6 % (ref 3–12)
NEUTROPHILS NFR BLD: 73 % (ref 36–65)
NEUTS SEG NFR BLD: 4.38 K/UL (ref 1.5–8.1)
NRBC BLD-RTO: 0 PER 100 WBC
OPIATES UR QL SCN: NEGATIVE
OXYCODONE UR QL SCN: NEGATIVE
PCP UR QL SCN: NEGATIVE
PLATELET # BLD AUTO: 239 K/UL (ref 138–453)
PMV BLD AUTO: 11 FL (ref 8.1–13.5)
RBC # BLD AUTO: 4.01 M/UL (ref 3.95–5.11)
RUBV IGG SERPL QL IA: >500 IU/ML
T PALLIDUM AB SER QL IA: NONREACTIVE
TEST INFORMATION: NORMAL
WBC OTHER # BLD: 6 K/UL (ref 3.5–11.3)

## 2024-04-21 LAB
AFP INTERPRETATION: NORMAL
AFP SPECIMEN: NORMAL
CRL: 80.9 MM
CROWN RUMP LENGTH TWIN B: NORMAL MM
CROWN RUMP LENGTH: 80.9
DATE OF BIRTH: NORMAL
DONOR EGG?: NORMAL
GESTATIONAL AGE: NORMAL
HX OF HEREDITARY DISORDERS: NORMAL
IN VITRO FERTILIZATION: NORMAL
MATERNAL AGE AT EDD: 29.4 YR
MATERNAL SCREEN, EER: NORMAL
MATERNAL WEIGHT: 218
MOM FOR NT, TWIN B: NORMAL
MOM FOR NT: 1.4
MOM FOR PAPP-A: 0.82
MONOCHORIONIC TWINS: NORMAL
MSHCG-MOM: 1.14
MSHCG: NORMAL IU/L
NUCHAL TRANSLUC (NT): 2.6
NUCHAL TRANSLUC (NT): 2.6 MM
NUCHAL TRANSLUCENCY TWIN B: NORMAL MM
NUMBER OF FETUSES: 1
NUMBER OF FETUSES: NORMAL
PAPP-A MOM: 756.3 NG/ML
PATIENT WEIGHT UNITS: NORMAL
PATIENT WEIGHT: NORMAL
PREV TRISOMY PREG: NORMAL
RACE (MATERNAL): NORMAL
RACE: NORMAL
READING MD CERT NUM: NORMAL
READING MD NAME: NORMAL
REPEAT SPECIMEN?: NORMAL
SMOKING: NORMAL
SMOKING: NORMAL
SONOGRAPHER CERT NO: NORMAL
SONOGRAPHER CERT NO: NORMAL
SONOGRAPHER NAME: NORMAL
SONOGRAPHER NAME: NORMAL
ULTRASOUND DATE: NORMAL
ULTRASOUND DATE: NORMAL

## 2024-04-22 ENCOUNTER — PATIENT MESSAGE (OUTPATIENT)
Dept: OBGYN CLINIC | Age: 29
End: 2024-04-22

## 2024-04-22 DIAGNOSIS — O99.810 ABNORMAL GLUCOSE TOLERANCE AFFECTING PREGNANCY, ANTEPARTUM: Primary | ICD-10-CM

## 2024-04-22 LAB — VZV IGG SER QL IA: 2.26

## 2024-04-23 NOTE — TELEPHONE ENCOUNTER
Rachael Polanco 4/23/2024 8:16 AM EDT      ----- Message -----  From: Amarilis Moreno  Sent: 4/22/2024 5:30 PM EDT  To: [facility] Clinical Support Pool  Subject: glucose tolerance     Hello,   Is there anyway Lesly or somebody from the office could call me tomorrow regarding this. I’ve never had this done so I just have a couple questions.

## 2024-04-25 ENCOUNTER — ROUTINE PRENATAL (OUTPATIENT)
Dept: OBGYN CLINIC | Age: 29
End: 2024-04-25

## 2024-04-25 VITALS — SYSTOLIC BLOOD PRESSURE: 120 MMHG | BODY MASS INDEX: 35.19 KG/M2 | DIASTOLIC BLOOD PRESSURE: 72 MMHG | WEIGHT: 218 LBS

## 2024-04-25 DIAGNOSIS — Z98.891 HISTORY OF CESAREAN SECTION: ICD-10-CM

## 2024-04-25 DIAGNOSIS — Z3A.14 14 WEEKS GESTATION OF PREGNANCY: ICD-10-CM

## 2024-04-25 DIAGNOSIS — Z34.92 NORMAL PREGNANCY, SECOND TRIMESTER: Primary | ICD-10-CM

## 2024-04-25 DIAGNOSIS — Z87.59 HISTORY OF GESTATIONAL HYPERTENSION: ICD-10-CM

## 2024-04-25 PROCEDURE — 99024 POSTOP FOLLOW-UP VISIT: CPT | Performed by: STUDENT IN AN ORGANIZED HEALTH CARE EDUCATION/TRAINING PROGRAM

## 2024-04-25 RX ORDER — ONDANSETRON 4 MG/1
4 TABLET, ORALLY DISINTEGRATING ORAL EVERY 8 HOURS PRN
Qty: 30 TABLET | Refills: 0 | Status: SHIPPED | OUTPATIENT
Start: 2024-04-25

## 2024-04-25 NOTE — PROGRESS NOTES
Prenatal Visit    Amarilis Moreno is a 28 y.o. female  at 14w2d    Subjective:  The patient was seen and evaluated. She has no complaints. She reports Negative fetal movements. She denies contractions, vaginal bleeding and leakage of fluid. Signs and symptoms of  labor as well as labor were reviewed. Dates were reviewed with the patient. Estimated Date of Delivery: 10/22/24.         Discussed resident involvement in triage and labor and delivery.  Discussed call group.  Patient verbalized understanding.    Vitals:  /72   Wt 98.9 kg (218 lb)   LMP 2024 (Exact Date)   BMI 35.19 kg/m²       Assessment & Plan:  Amarilis Moreno is a 28 y.o. female  at 14w2d   - Initial prenatal labs were reviewed   - An 18-22 week anatomy ultrasound has been ordered   - First trimester screening and MSAFP Single Marker/NIPT: low risk FTS   - Carrier screening declined   - Influenza and Covid vaccinations recommended per ACOG: R/B/A discussed with increased risk of both maternal and fetal morbidity and mortality in unvaccinated pregnant patients.    - Aspirin indicated yes    - Continue prenatal vitamin        FOB Name: Marvel    Last Pap Smear: 10/25/21 NEG  [x] Urine collected for culture- 3/20/24 Dania Adan MA    [x] Negative date 3/20   [] Positive date   [x] UDS collected on 3/20/24 > negative Linda Bella DO   [x] Gc/ct collected 3/27/2024 Michelle Boyd LPN  > negative Linda Bella DO   [x] Prenatal Labs completed     Genetic Screening:  [x]Accepted NIPS vs FTS **FTS 3/27/2024 Michelle Boyd LPN   [] Undecided   []Declined   [x]Completed  [x] Low risk FTS Linda Bella DO   [] Abn for     Carrier Screening:  [] Undecided   [] Accepted   [x] Declined  [] Known negative CF/SMA/Fragile X  [] Results     Baby aspirin screen:  [x]Positive  []Negative    Early 1 hour GTT: ordered 3/20/24  [x]Yes  [] No    Anatomy scan:  [x]Referral Sent 3/27/2024 Michelle Boyd LPN   [x]Scheduled

## 2024-05-03 ENCOUNTER — HOSPITAL ENCOUNTER (OUTPATIENT)
Age: 29
Setting detail: SPECIMEN
Discharge: HOME | End: 2024-05-03

## 2024-05-03 DIAGNOSIS — O99.810 ABNORMAL GLUCOSE TOLERANCE AFFECTING PREGNANCY, ANTEPARTUM: ICD-10-CM

## 2024-05-03 LAB
AMOUNT GLUCOSE GIVEN: 100 G
GLUCOSE 2H P 75 G GLC PO SERPL-MCNC: 83 MG/DL
GLUCOSE TOLERANCE TEST 1 HOUR: 209 MG/DL
GLUCOSE TOLERANCE TEST 2 HOUR: 169 MG/DL
GLUCOSE TOLERANCE TEST 3 HOUR: 51 MG/DL

## 2024-05-08 ENCOUNTER — TELEPHONE (OUTPATIENT)
Dept: OBGYN CLINIC | Age: 29
End: 2024-05-08

## 2024-05-08 DIAGNOSIS — O24.319 PREGESTATIONAL DIABETES MELLITUS, MODIFIED WHITE CLASS B: Primary | ICD-10-CM

## 2024-05-09 RX ORDER — GLUCOSAMINE HCL/CHONDROITIN SU 500-400 MG
CAPSULE ORAL
Qty: 200 STRIP | Refills: 5 | Status: SHIPPED | OUTPATIENT
Start: 2024-05-09

## 2024-05-09 RX ORDER — GLUCOSAMINE HCL/CHONDROITIN SU 500-400 MG
200 CAPSULE ORAL 4 TIMES DAILY
Qty: 200 EACH | Refills: 5 | Status: SHIPPED | OUTPATIENT
Start: 2024-05-09 | End: 2024-06-28

## 2024-05-09 RX ORDER — BLOOD-GLUCOSE METER
1 KIT MISCELLANEOUS DAILY PRN
Qty: 1 EACH | Refills: 0 | Status: SHIPPED | OUTPATIENT
Start: 2024-05-09

## 2024-05-09 RX ORDER — LANCETS 30 GAUGE
200 EACH MISCELLANEOUS 4 TIMES DAILY
Qty: 200 EACH | Refills: 5 | Status: SHIPPED | OUTPATIENT
Start: 2024-05-09

## 2024-05-09 NOTE — TELEPHONE ENCOUNTER
Please apologize trying to work through results.     She did fail her 3 hr GTT 2/4 elevations   This is considered pre-gestational diabetes.    Please pend diabetic supplies to her pharmacy she needs to start checking her sugars 4 times daily and sending them to us weekly.  We can send her glucose logs through [employer] or have her come to the office to pick..  We want her fasting glucoses less than 90 and her 2-hour after meal glucose is to be under 120.  Please place referral to [facility] for nutritionist.  We also need to place a referral to [facility] for pregestational diabetes.    Please note in care coordination.

## 2024-05-09 NOTE — TELEPHONE ENCOUNTER
Pt made aware of recommendations. Supplies and MFM referral pending. Blood sugar logs sent to patient through [employer]. CC note updated.

## 2024-05-21 ENCOUNTER — ROUTINE PRENATAL (OUTPATIENT)
Dept: OBGYN CLINIC | Age: 29
End: 2024-05-21

## 2024-05-21 VITALS — BODY MASS INDEX: 35.19 KG/M2 | WEIGHT: 218 LBS | SYSTOLIC BLOOD PRESSURE: 114 MMHG | DIASTOLIC BLOOD PRESSURE: 60 MMHG

## 2024-05-21 DIAGNOSIS — O09.92 HIGH-RISK PREGNANCY IN SECOND TRIMESTER: Primary | ICD-10-CM

## 2024-05-21 DIAGNOSIS — Z98.891 HISTORY OF CESAREAN SECTION: ICD-10-CM

## 2024-05-21 DIAGNOSIS — Z87.59 HISTORY OF GESTATIONAL HYPERTENSION: ICD-10-CM

## 2024-05-21 DIAGNOSIS — O24.319 PREGESTATIONAL DIABETES MELLITUS, MODIFIED WHITE CLASS B: ICD-10-CM

## 2024-05-21 DIAGNOSIS — Z3A.18 18 WEEKS GESTATION OF PREGNANCY: ICD-10-CM

## 2024-05-21 DIAGNOSIS — O99.210 OBESITY IN PREGNANCY: ICD-10-CM

## 2024-05-21 PROCEDURE — 0502F SUBSEQUENT PRENATAL CARE: CPT | Performed by: NURSE PRACTITIONER

## 2024-05-21 NOTE — PROGRESS NOTES
checking glucose fasting and 2 hours after breakfast, lunch and dinner.) 1 each 0    ondansetron (ZOFRAN-ODT) 4 MG disintegrating tablet Take 1 tablet by mouth every 8 hours as needed for Nausea or Vomiting 30 tablet 0    Docosahexaenoic Acid (PRENATAL DHA) 200 MG CAPS Take by mouth      aspirin 81 MG EC tablet Take 1 tablet by mouth daily 90 tablet 1     No current facility-administered medications for this visit.           History reviewed. No pertinent past medical history.    Past Surgical History:   Procedure Laterality Date     SECTION N/A 2022     SECTION performed by Lorenzo Castellanos MD at [facility] L[facility]D OR     Headaches: no  Swelling: no  Bleeding: no  Discharge: no   Dysuria: no  Nausea: no  Heartburn: no  Epigastric pain: no  Contractions: no  Leakage of fluid: no  + fetal movement     1. High-risk pregnancy in second trimester  Negative FTS  Anatomy US scheduled   2. 18 weeks gestation of pregnancy      3. Pregestational diabetes mellitus, modified White class B  - failed early 1 hr and early 3 hr GTT  elevations  - referral MFM and nutritionist and diabetic supplies sent 2024   - Had nutritionist consult with [facility] on 5/15/24  - Consult MFM scheduled 6/10/24  - She would prefer us to manage her glucoses    Reviewed patient is a pregestational diabetic dx in this pregnancy diet controlled   Current Plan of care includes   [] Diet controlled  [] Insulin controlled. Regimen   [x] Monitoring blood sugars fasting in the am and q2 hours after breakfast/lunch/dinner  [x] Fetal Growth scans every 4-6 weeks  [] Managed by MFM  [] Managed by - she would like us to manage  [x] Sending blood sugars in weekly for review- reviewed past week all levels WNL  [x] NST/BPP weekly after 32 weeks  []Agreeable with recommendations for delivery between 39w0d - 39w6d (medication controlled)  []Agreeable with recommendations for delivery between 39w0d and 40w6d (non medication

## 2024-06-04 ENCOUNTER — HOSPITAL ENCOUNTER (OUTPATIENT)
Age: 29
Discharge: HOME | End: 2024-06-04
Payer: COMMERCIAL

## 2024-06-04 ENCOUNTER — ROUTINE PRENATAL (OUTPATIENT)
Dept: PERINATAL CARE | Age: 29
End: 2024-06-04
Payer: COMMERCIAL

## 2024-06-04 VITALS
SYSTOLIC BLOOD PRESSURE: 126 MMHG | TEMPERATURE: 98.5 F | HEART RATE: 78 BPM | WEIGHT: 212 LBS | HEIGHT: 66 IN | DIASTOLIC BLOOD PRESSURE: 68 MMHG | BODY MASS INDEX: 34.07 KG/M2 | RESPIRATION RATE: 16 BRPM

## 2024-06-04 DIAGNOSIS — Z36.86 ENCOUNTER FOR SCREENING FOR RISK OF PRE-TERM LABOR: ICD-10-CM

## 2024-06-04 DIAGNOSIS — Z3A.20 20 WEEKS GESTATION OF PREGNANCY: ICD-10-CM

## 2024-06-04 DIAGNOSIS — O99.212 OBESITY AFFECTING PREGNANCY IN SECOND TRIMESTER, UNSPECIFIED OBESITY TYPE: ICD-10-CM

## 2024-06-04 DIAGNOSIS — O24.119 PRE-EXISTING TYPE 2 DIABETES MELLITUS DURING PREGNANCY, ANTEPARTUM: Primary | ICD-10-CM

## 2024-06-04 DIAGNOSIS — Z87.59 HISTORY OF GESTATIONAL HYPERTENSION: ICD-10-CM

## 2024-06-04 LAB
CREAT UR-MCNC: 67.2 MG/DL (ref 28–217)
EST. AVERAGE GLUCOSE BLD GHB EST-MCNC: 82 MG/DL
HBA1C MFR BLD: 4.5 % (ref 4–6)
TOTAL PROTEIN, URINE: 7 MG/DL
URINE TOTAL PROTEIN CREATININE RATIO: 0.1

## 2024-06-04 PROCEDURE — 84156 ASSAY OF PROTEIN URINE: CPT

## 2024-06-04 PROCEDURE — 76817 TRANSVAGINAL US OBSTETRIC: CPT | Performed by: OBSTETRICS & GYNECOLOGY

## 2024-06-04 PROCEDURE — 36415 COLL VENOUS BLD VENIPUNCTURE: CPT

## 2024-06-04 PROCEDURE — 99203 OFFICE O/P NEW LOW 30 MIN: CPT | Performed by: OBSTETRICS & GYNECOLOGY

## 2024-06-04 PROCEDURE — 82105 ALPHA-FETOPROTEIN SERUM: CPT

## 2024-06-04 PROCEDURE — 82570 ASSAY OF URINE CREATININE: CPT

## 2024-06-04 PROCEDURE — 83036 HEMOGLOBIN GLYCOSYLATED A1C: CPT

## 2024-06-04 PROCEDURE — 76811 OB US DETAILED SNGL FETUS: CPT | Performed by: OBSTETRICS & GYNECOLOGY

## 2024-06-07 LAB
AFP INTERPRETATION: NORMAL
AFP MOM: 0.62
AFP SPECIMEN: NORMAL
AFP: 29 NG/ML
DATE OF BIRTH: NORMAL
DATING METHOD: NORMAL
DETERMINED BY: NORMAL
DIABETIC: NEGATIVE
DONOR EGG?: NORMAL
DUE DATE: NORMAL
ESTIMATED DUE DATE: NORMAL
FAMILY HISTORY NTD: NEGATIVE
GESTATIONAL AGE: NORMAL
IN VITRO FERTILIZATION: NORMAL
INSULIN REQ DIABETES: NO
LAST MENSTRUAL PERIOD: NORMAL
MATERNAL AGE AT EDD: 29.4 YR
MATERNAL WEIGHT: 212
MONOCHORIONIC TWINS: NORMAL
NUMBER OF FETUSES: NORMAL
PATIENT WEIGHT UNITS: NORMAL
PATIENT WEIGHT: NORMAL
RACE (MATERNAL): NORMAL
RACE: NORMAL
REPEAT SPECIMEN?: NORMAL
SMOKING: NORMAL
SMOKING: NORMAL
VALPROIC/CARBAMAZEP: NORMAL
ZZ NTE CLEAN UP: HISTORY: NO

## 2024-06-21 ENCOUNTER — ROUTINE PRENATAL (OUTPATIENT)
Dept: OBGYN CLINIC | Age: 29
End: 2024-06-21

## 2024-06-21 VITALS
BODY MASS INDEX: 34.23 KG/M2 | SYSTOLIC BLOOD PRESSURE: 116 MMHG | WEIGHT: 213 LBS | DIASTOLIC BLOOD PRESSURE: 70 MMHG | HEIGHT: 66 IN

## 2024-06-21 DIAGNOSIS — O09.92 HIGH-RISK PREGNANCY IN SECOND TRIMESTER: Primary | ICD-10-CM

## 2024-06-21 DIAGNOSIS — O99.210 OBESITY IN PREGNANCY: ICD-10-CM

## 2024-06-21 DIAGNOSIS — Z3A.22 22 WEEKS GESTATION OF PREGNANCY: ICD-10-CM

## 2024-06-21 DIAGNOSIS — Z87.59 HISTORY OF GESTATIONAL HYPERTENSION: ICD-10-CM

## 2024-06-21 DIAGNOSIS — Z98.891 HISTORY OF CESAREAN SECTION: ICD-10-CM

## 2024-06-21 DIAGNOSIS — O24.319 PREGESTATIONAL DIABETES MELLITUS, MODIFIED WHITE CLASS B: ICD-10-CM

## 2024-06-21 PROCEDURE — 0502F SUBSEQUENT PRENATAL CARE: CPT | Performed by: ADVANCED PRACTICE MIDWIFE

## 2024-06-21 NOTE — PROGRESS NOTES
SUBJECTIVE:    Amarilis is here for her return OB visit. denies concerns today.   She reports  feeling fetal movement.  She denies vaginal bleeding.  She denies vaginal discharge.  She denies leaking of fluid.  She denies uterine cramping.  She denies  nausea and/or vomiting.    OBJECTIVE:  Blood pressure 116/70, height 1.676 m (5' 6\"), weight 96.6 kg (213 lb), last menstrual period 2024, not currently breastfeeding.    Total weight gain: -1.361 kg (-3 lb)      ASSESSMENT/PLAN:  IUP @ 22+3 weeks  S=D    High Risk Pregnancy  Due date is based on LMP and confirmed with 10w1d early dating ultrasound  Patient's prenatal labs are completed.  Patient's blood type O positive and rhogam is not indicated in the pregnancy.   Early glucola indicated: yes  Completed: Yes  Results: 162 Fail  Plan: fasting 3 hour completed 5/3/24 83/209/169/51  Patient Accepted  genetic screening.   Accepted and first trimester screeningand test(s) are low risk for trisomy 18/21 (FTS)  Anatomy scan scheduled 24 completed. Placenta posterior,normal cord insertion: Yes cervical length 5.12 cm, NO low lying placenta, no placenta previa . F/up at 24 weeks for growth/echo d/t preGDM  Second trimester 24-28 week labs:   [x] Ordered 2024 ANA Pedersen - BRIGIDA   Total weight gain in pregnancy reviewed: Yes  Fetal movement was reviewed.  Reviewed signs and symptoms of  labor: Yes  Reviewed signs and symptoms of preeclampsia: Yes  Reviewed signs and symptoms of marleni hick contractions:  Yes    2. 22 weeks gestation of pregnancy  - CBC; Future  - Culture, Urine; Future    3. Pregestational diabetes mellitus, modified White class B  - diet controlled   - has f/up USN and fetal echo scheduled   - discussed fetal surveillance at 32 weeks     4. Obesity in pregnancy    5. History of gestational hypertension  - taking baby ASA     6. History of  section  - remains unsure on route of delivery         She was counseled

## 2024-07-08 ENCOUNTER — ROUTINE PRENATAL (OUTPATIENT)
Dept: PERINATAL CARE | Age: 29
End: 2024-07-08
Payer: COMMERCIAL

## 2024-07-08 VITALS
SYSTOLIC BLOOD PRESSURE: 125 MMHG | RESPIRATION RATE: 16 BRPM | HEART RATE: 90 BPM | BODY MASS INDEX: 34.08 KG/M2 | TEMPERATURE: 98.2 F | DIASTOLIC BLOOD PRESSURE: 64 MMHG | WEIGHT: 212.08 LBS | HEIGHT: 66 IN

## 2024-07-08 DIAGNOSIS — Z87.59 HISTORY OF GESTATIONAL HYPERTENSION: ICD-10-CM

## 2024-07-08 DIAGNOSIS — Z3A.24 24 WEEKS GESTATION OF PREGNANCY: ICD-10-CM

## 2024-07-08 DIAGNOSIS — O24.119 PRE-EXISTING TYPE 2 DIABETES MELLITUS DURING PREGNANCY, ANTEPARTUM: Primary | ICD-10-CM

## 2024-07-08 DIAGNOSIS — Z36.4 ULTRASOUND FOR ANTENATAL SCREENING FOR FETAL GROWTH RESTRICTION: ICD-10-CM

## 2024-07-08 DIAGNOSIS — O99.212 OBESITY AFFECTING PREGNANCY IN SECOND TRIMESTER, UNSPECIFIED OBESITY TYPE: ICD-10-CM

## 2024-07-08 PROCEDURE — 99999 PR OFFICE/OUTPT VISIT,PROCEDURE ONLY: CPT | Performed by: OBSTETRICS & GYNECOLOGY

## 2024-07-08 PROCEDURE — 76827 ECHO EXAM OF FETAL HEART: CPT | Performed by: OBSTETRICS & GYNECOLOGY

## 2024-07-08 PROCEDURE — 93325 DOPPLER ECHO COLOR FLOW MAPG: CPT | Performed by: OBSTETRICS & GYNECOLOGY

## 2024-07-08 PROCEDURE — 76825 ECHO EXAM OF FETAL HEART: CPT | Performed by: OBSTETRICS & GYNECOLOGY

## 2024-07-08 PROCEDURE — 76816 OB US FOLLOW-UP PER FETUS: CPT | Performed by: OBSTETRICS & GYNECOLOGY

## 2024-07-12 ENCOUNTER — ROUTINE PRENATAL (OUTPATIENT)
Dept: OBGYN CLINIC | Age: 29
End: 2024-07-12

## 2024-07-12 ENCOUNTER — PATIENT MESSAGE (OUTPATIENT)
Dept: OBGYN CLINIC | Age: 29
End: 2024-07-12

## 2024-07-12 VITALS — WEIGHT: 211 LBS | DIASTOLIC BLOOD PRESSURE: 60 MMHG | SYSTOLIC BLOOD PRESSURE: 116 MMHG | BODY MASS INDEX: 34.06 KG/M2

## 2024-07-12 DIAGNOSIS — Z3A.25 25 WEEKS GESTATION OF PREGNANCY: Primary | ICD-10-CM

## 2024-07-12 PROCEDURE — 0502F SUBSEQUENT PRENATAL CARE: CPT | Performed by: OBSTETRICS & GYNECOLOGY

## 2024-07-12 NOTE — PROGRESS NOTES
ANA Mina CNM     Fetal Surveillance:  [x] Yes 32wks d/t pregdm 2024 Carly Fernandez ANA Mina CNM   [] No    Covid Vaccine: no, declined  Flu Vaccine:  []Given **  [x] Declined 2024 Linda Bella DO   Tdap:  []Given **  [] Declined **  Rhogam: O+  []Given   [x] Not indicated     Early 1hr GTT: FAILED (162)  Early 3 hr GTT: **  1hr GTT: not indicated   3hr GTT:  [] Results     []Breast Pump Order Signed/Sent    36 weeks US:  []Completed     GBS:  [] Positive   [] Negative from **    Apts with :  [x] Date: 24  Gestational Age: 25w3d  [] Date: ** Gestational Age: **    Apts with :  [] Date: ** Gestational Age: **  [] Date: ** Gestational Age: **    Depression/Anxiety screening (date/results/sign off)  1st trimester- 3/20/24 Linda Bella DO   *EPDS score: 2  *GAD7 score: 1  *MOOD score: 0    2nd trimester  *EPDS score:  *GAD7 score:    3rd trimester  *EPDS score:  *GAD7 score:     Assessment:  1Archie Moreno is a 29 y.o. female  2.   3. 25w3d    Patient Active Problem List    Diagnosis Date Noted    History of gestational hypertension 2022     Priority: Medium    PLTCS 22 F Apg 8/9 Wt 6#14 2022     Priority: Medium    History of  section 2024        Diagnosis Orders   1. 25 weeks gestation of pregnancy  CBC with Auto Differential    Culture, Urine            Plan:  The patient will return to the office for her next visit in 4 weeks. See antepartum flow sheet.   Counseled on s/s of preeclampsia.  Counseled on s/s of  labor.  Counseled on fetal movement  Discussed glucose control and management.  Continue to check fasting/2hr PP glucose daily.          Patient was seen with total face to face time of 20 minutes. More than 50% of this visit was on counseling and education regarding her    Diagnosis Orders   1. 25 weeks gestation of pregnancy  CBC with Auto Differential    Culture, Urine       and her options. She was

## 2024-07-15 NOTE — TELEPHONE ENCOUNTER
From: Amarilis Moreno  To: Dr. Servando Britt  Sent: 7/12/2024 2:32 PM EDT  Subject: Question from appointment     During my appointment you mentioned some stuff if possibly I start getting hemorrhoids or to help use the restroom. Were you going to prescribe that stuff or should I just pick that stuff up? If so, what are like some of the brands just so I can have them just in case?

## 2024-07-16 NOTE — TELEPHONE ENCOUNTER
If you are having difficulty with bowel movements I would recommend Senna/Sennakot. This is different than colace which is a stool softener and often not helpful. Senna can be purchased over the counter. Start with 1 tablet once a day depending on how constipated you are. Can titrate to what you need such as 1 tablet in the morning and 1 at night or 2 in the morning, 1 at night, etc.     For hemorrhoid care the best is to keep the stool soft and  not need to push. Can use tucks pads and otc hemorrhoid ointments.     Dr. Bella

## 2024-07-24 ENCOUNTER — PATIENT MESSAGE (OUTPATIENT)
Dept: OBGYN CLINIC | Age: 29
End: 2024-07-24

## 2024-07-24 NOTE — TELEPHONE ENCOUNTER
Traveling in pregnancy:    If traveling while pregnant wear compression socks (knee high or higher) and move every 2 hours to reduce risk of blood clot. If you are flying I would recommend calling the airline if over 28 weeks as some airlines have restrictions later in pregnancy. Please be aware that anyone is at a risk of an unforeseen emergency or early delivery.     Dr. Bella

## 2024-07-24 NOTE — TELEPHONE ENCOUNTER
From: Amarilis Moreno  To: Dr. Servando Britt  Sent: 7/24/2024 11:27 AM EDT  Subject: Travel Question    Hello,   I may have to travel and fly to [facility] for work at the end on August and I will be around 32 weeks pregnant. Is that okay to travel then or should talk to my boss to pass for safety?

## 2024-07-30 ENCOUNTER — PATIENT MESSAGE (OUTPATIENT)
Dept: OBGYN CLINIC | Age: 29
End: 2024-07-30

## 2024-07-31 NOTE — TELEPHONE ENCOUNTER
From: Amarilis Moreno  To: Dr. Servando Britt  Sent: 7/30/2024 10:31 PM EDT  Subject: Blood Sugar Logs     I apologize I thought I sent these but had them in my drafts.   7/2-7/8

## 2024-08-01 NOTE — TELEPHONE ENCOUNTER
Blood sugars are normal! Continue to monitor. Some morning values are close and 2 values are 90 or higher. May have to start a low dose night time insulin in the future.     Dr. Bella

## 2024-08-07 ENCOUNTER — ROUTINE PRENATAL (OUTPATIENT)
Dept: PERINATAL CARE | Age: 29
End: 2024-08-07
Payer: COMMERCIAL

## 2024-08-07 VITALS
SYSTOLIC BLOOD PRESSURE: 130 MMHG | HEIGHT: 66 IN | WEIGHT: 211.42 LBS | HEART RATE: 79 BPM | DIASTOLIC BLOOD PRESSURE: 75 MMHG | RESPIRATION RATE: 16 BRPM | BODY MASS INDEX: 33.98 KG/M2 | TEMPERATURE: 98.1 F

## 2024-08-07 DIAGNOSIS — Z36.4 ULTRASOUND FOR ANTENATAL SCREENING FOR FETAL GROWTH RESTRICTION: ICD-10-CM

## 2024-08-07 DIAGNOSIS — Z36.3 ENCOUNTER FOR ROUTINE SCREENING FOR MALFORMATION USING ULTRASONICS: ICD-10-CM

## 2024-08-07 DIAGNOSIS — Z3A.29 29 WEEKS GESTATION OF PREGNANCY: ICD-10-CM

## 2024-08-07 DIAGNOSIS — O99.213 OBESITY AFFECTING PREGNANCY IN THIRD TRIMESTER, UNSPECIFIED OBESITY TYPE: ICD-10-CM

## 2024-08-07 DIAGNOSIS — O24.119 PRE-EXISTING TYPE 2 DIABETES MELLITUS DURING PREGNANCY, ANTEPARTUM: Primary | ICD-10-CM

## 2024-08-07 PROCEDURE — 76805 OB US >/= 14 WKS SNGL FETUS: CPT | Performed by: OBSTETRICS & GYNECOLOGY

## 2024-08-07 PROCEDURE — 99999 PR OFFICE/OUTPT VISIT,PROCEDURE ONLY: CPT | Performed by: OBSTETRICS & GYNECOLOGY

## 2024-08-07 PROCEDURE — 76819 FETAL BIOPHYS PROFIL W/O NST: CPT | Performed by: OBSTETRICS & GYNECOLOGY

## 2024-08-08 ENCOUNTER — ROUTINE PRENATAL (OUTPATIENT)
Dept: OBGYN CLINIC | Age: 29
End: 2024-08-08

## 2024-08-08 VITALS — DIASTOLIC BLOOD PRESSURE: 68 MMHG | SYSTOLIC BLOOD PRESSURE: 124 MMHG | WEIGHT: 210.8 LBS | BODY MASS INDEX: 34.02 KG/M2

## 2024-08-08 DIAGNOSIS — Z98.891 HISTORY OF CESAREAN SECTION: ICD-10-CM

## 2024-08-08 DIAGNOSIS — O24.319 PREGESTATIONAL DIABETES MELLITUS, MODIFIED WHITE CLASS B: ICD-10-CM

## 2024-08-08 DIAGNOSIS — E66.09 CLASS 1 OBESITY DUE TO EXCESS CALORIES WITHOUT SERIOUS COMORBIDITY WITH BODY MASS INDEX (BMI) OF 34.0 TO 34.9 IN ADULT: ICD-10-CM

## 2024-08-08 DIAGNOSIS — Z3A.29 29 WEEKS GESTATION OF PREGNANCY: ICD-10-CM

## 2024-08-08 DIAGNOSIS — Z87.59 HISTORY OF GESTATIONAL HYPERTENSION: ICD-10-CM

## 2024-08-08 DIAGNOSIS — O09.93 HIGH-RISK PREGNANCY IN THIRD TRIMESTER: Primary | ICD-10-CM

## 2024-08-08 PROCEDURE — 99024 POSTOP FOLLOW-UP VISIT: CPT | Performed by: STUDENT IN AN ORGANIZED HEALTH CARE EDUCATION/TRAINING PROGRAM

## 2024-08-08 NOTE — PROGRESS NOTES
Prenatal Visit    Amarilis Moreno is a 29 y.o. female  at 29w2d    The patient was seen and evaluated. She has no complaints. There was positive fetal movements. She denies contractions, vaginal bleeding and leakage of fluid. Signs and symptoms of  labor as well as labor were reviewed. The S/S of Pre-Eclampsia were reviewed with the patient in detail. She is to report any of these if they occur. She currently denies any of these.    Discussed resident involvement in triage and labor and delivery process. Discussed call group. All questions answered. Patient verbalized understanding and agreement.     Vitals:  /68   Wt 95.6 kg (210 lb 12.8 oz)   LMP 2024 (Exact Date)   BMI 34.02 kg/m²       Assessment & Plan:  Amarilis Moreno is a 29 y.o. female  at 29w2d   - 28 week labs ordered   - Influenza and Covid vaccinations recommended per ACOG: R/B/A discussed with increased risk of both maternal and fetal morbidity and mortality in unvaccinated pregnant patients.    - TDAP and RSV vaccinations recommended per ACOG. R/B/A discussed. She understands must received RSV vaccine at pharmacy.   - Continue prenatal vitamin      Insurance: [employer]    IPV bag/mug given:2024 Linda Bella DO   Genetic Information given/reviewed:3/20/24 Lesly Avalos CNP   1st trimester education packet given:  2nd trimester education packet given:2024 Linda Bella DO   3rd trimester education packet given:      FOB Name: Marvel    Last Pap Smear: 10/25/21 NEG  [x] Urine collected for culture- 3/20/24 Linda Bella DO    [x] Negative date 3/20   [] Positive date   [x] UDS collected on 3/20/24 > negative Linda Bella DO   [x] Gc/ct collected 2024 Linda Bella DO  > negative Linda Bella DO   [x] Prenatal Labs completed     Genetic Screening:  [x]Accepted NIPS vs FTS **FTS 2024 Linda Bella DO   [] Undecided   []Declined   [x]Completed  [x] Low risk FTS

## 2024-08-11 PROBLEM — O24.319 PREGESTATIONAL DIABETES MELLITUS, MODIFIED WHITE CLASS B: Status: ACTIVE | Noted: 2024-08-11

## 2024-08-14 ENCOUNTER — TELEPHONE (OUTPATIENT)
Dept: OBGYN CLINIC | Age: 29
End: 2024-08-14

## 2024-08-14 NOTE — TELEPHONE ENCOUNTER
Needs set up for . I think following with MFM for BPP.     Dr. Bella     Called pt to set up NST as she should be seeing MFM- pt was not happy and a little taken back as she was just here and was not told this- she remembered MFM said later in pregnancy-     I apologized to patient and stated when I see  testing I assume we are starting at 32 weeks- stated their our some cases they do not start until 36 weeks- I stated I can confirm with Dr Bella-     Pt stated multiple times that she wanted to make sure Dr Britt is aware of her care and her blood sugar logs as Dr Bella has been the one responding - I told pt we do not have one main ob providers for the ob pt- pt rotate with all providers and closer to the end of pregnancy between both dr. She stated she understood that and nothing is wrong with seeing Dr Bella- just she did not have this with her first pregnancy and dr britt was her ob and very personable- I apologized and stated that is not how the office runs- I did inform pt that blood sugar logs are routed to both providers as some are on insulin and need to be responded in timely manner- stated depending call schedule- on call post call days one provider can respond sooner then the other- pt again stated wanted to make sure that dr britt is aware since Dr Bella is the only one to respond- stated all blood sugars have been routed to Dr Britt as well-     (FYI- I do not send to providers if they are out of the office or on vacation for extended period of time so it does not clutter the inbox)    Stated will ask Dr Bella to confirm when we need to start  testing. And can call her back this afternoon    Pt stated that was ok.

## 2024-08-14 NOTE — TELEPHONE ENCOUNTER
Thank you for reaching out to the patient. This has been a known diagnosis since failure of her early 3 hour GTT with documentation of needs for  testing starting at 32 weeks. She will follow with MFM for BPP as well as growth and repeat fetal echo and follow in this office for NSTs. I apologize if I did not reiterate this at our last visit on the .     Blood sugars go to both physicians as you stated. We have both responded at different times to her blood sugar values.     Thank you!    Dr. Bella

## 2024-08-15 NOTE — TELEPHONE ENCOUNTER
Pt was made aware of all recommendations and did apologize on Dr Bella behave - pt stated it was ok and figured that is why we are starting  testing    The week of - pt has an appointment already in our office on - she will be a few days shy of 32 weeks ( 32 weeks on ) ok to just proceed with regular prenatal visit that day and start  testing the following week or do we need to r.s vs start it a few days early?

## 2024-08-15 NOTE — TELEPHONE ENCOUNTER
Either was is sufficient. Whatever scheduling works best for the patient! Can cancel the prenatal appointment and schedule  and prenatal the following week if she would like.     Dr. Bella

## 2024-08-26 ENCOUNTER — ROUTINE PRENATAL (OUTPATIENT)
Dept: OBGYN CLINIC | Age: 29
End: 2024-08-26

## 2024-08-26 ENCOUNTER — HOSPITAL ENCOUNTER (OUTPATIENT)
Age: 29
Setting detail: SPECIMEN
Discharge: HOME | End: 2024-08-26

## 2024-08-26 VITALS — BODY MASS INDEX: 34.06 KG/M2 | DIASTOLIC BLOOD PRESSURE: 68 MMHG | WEIGHT: 211 LBS | SYSTOLIC BLOOD PRESSURE: 106 MMHG

## 2024-08-26 DIAGNOSIS — Z87.59 HISTORY OF GESTATIONAL HYPERTENSION: ICD-10-CM

## 2024-08-26 DIAGNOSIS — Z3A.31 31 WEEKS GESTATION OF PREGNANCY: ICD-10-CM

## 2024-08-26 DIAGNOSIS — O24.319 PREGESTATIONAL DIABETES MELLITUS, MODIFIED WHITE CLASS B: ICD-10-CM

## 2024-08-26 DIAGNOSIS — O09.93 HIGH-RISK PREGNANCY IN THIRD TRIMESTER: Primary | ICD-10-CM

## 2024-08-26 DIAGNOSIS — Z3A.25 25 WEEKS GESTATION OF PREGNANCY: ICD-10-CM

## 2024-08-26 DIAGNOSIS — E66.09 CLASS 1 OBESITY DUE TO EXCESS CALORIES WITHOUT SERIOUS COMORBIDITY WITH BODY MASS INDEX (BMI) OF 34.0 TO 34.9 IN ADULT: ICD-10-CM

## 2024-08-26 DIAGNOSIS — Z98.891 HISTORY OF CESAREAN SECTION: ICD-10-CM

## 2024-08-26 PROCEDURE — 0502F SUBSEQUENT PRENATAL CARE: CPT | Performed by: NURSE PRACTITIONER

## 2024-08-26 NOTE — PATIENT INSTRUCTIONS
ask your healthcare professional. [employer] disclaims any warranty or liability for your use of this information.          Labor: Care Instructions  Your Care Instructions     labor is the start of labor between 20 and 36 weeks of pregnancy. A full-term pregnancy lasts 37 to 42 weeks. In labor, the uterus contracts to open the cervix. This is the first stage of childbirth.  labor can be caused by a problem with the baby, the mother, or both. Often the cause is not known.  In some cases, doctors use medicines to try to delay labor until 34 or more weeks of pregnancy. By this time, a baby has grown enough so that problems are not likely. In some cases--such as with a serious infection--it is healthier for the baby to be born early. Your treatment will depend on how far along you are in your pregnancy and on your health and your baby's health.  Follow-up care is a key part of your treatment and safety. Be sure to make and go to all appointments, and call your doctor if you are having problems. It's also a good idea to know your test results and keep a list of the medicines you take.  How can you care for yourself at home?  If your doctor prescribed medicines, take them exactly as directed. Call your doctor if you think you are having a problem with your medicine.  Rest until your doctor advises you about activity. He or she will tell you if you should stay in bed most of the time. You may need to arrange for  if you have young children.  Do not have sexual intercourse unless your doctor says it is safe.  Use pads, not tampons, if you have vaginal bleeding.  Make sure to drink plenty of fluids. Dehydration can lead to contractions. If you have kidney, heart, or liver disease and have to limit fluids, talk with your doctor before you increase the amount of fluids you drink.  Do not smoke or allow others to smoke around you. If you need help quitting, talk to your doctor about  always ask your healthcare professional. [employer] disclaims any warranty or liability for your use of this information.

## 2024-08-26 NOTE — PROGRESS NOTES
Gestational age @ 31w6d  Indications @ GDM   NST started @  BP @ 106/68  Weight @ 211 lb   Patient recording movements. Patient stated fetal movement active

## 2024-08-26 NOTE — PROGRESS NOTES
S:  Here for NST for fetal surveillance secondary to high risk pregnancy- pre gestational diabetes     Insurance: [employer]    IPV bag/mug given:2024 Linda Bella DO   Genetic Information given/reviewed:3/20/24 Lesly Avalos CNP   1st trimester education packet given:  2nd trimester education packet given:2024 Linda Bella DO   3rd trimester education packet given:      FOB Name: Marvel    Last Pap Smear: 10/25/21 NEG  [x] Urine collected for culture- 3/20/24 Linda Bella DO    [x] Negative date 3/20   [] Positive date   [x] UDS collected on 3/20/24 > negative Linda Bella DO   [x] Gc/ct collected 2024 Linda Bella DO  > negative Linda Bella DO   [x] Prenatal Labs completed     Genetic Screening:  [x]Accepted NIPS vs FTS **FTS 2024 Linda Bella DO   [] Undecided   []Declined   [x]Completed  [x] Low risk FTS Linda Bella DO   [] Abn for     Carrier Screening:  [] Undecided   [] Accepted   [x] Declined  [] Known negative CF/SMA/Fragile X  [] Results     Baby aspirin screen:  [x]Positive  []Negative    Early 1 hour GTT: ordered 3/20/24  [x]Yes 162 FAIL. 3 hour 83/209/169/51 2/4 elevations fail dx preGDM  [] No    AFP: declined  []Ordered  []Completed    Anatomy scan:  [x]Referral Sent 2024 Linda Bella DO   [x]Scheduled 24  [x]Completed MFM : Posterior, normal cord insertion, 3 VC, Female, Normal Anatomy. CL 5cm. F/u 24 weeks growth and echo  [x] Follow up 24 weeks growth and fetal echo > completed.   MFM : right lateral placenta, MARGUERITE 19, growth 59%tile f/up 32 wks    Fetal Echo:   [x] Yes d/t pregdm  [] No    High Risk Factors:  Obesity  -pregravidum BMI34.86  -recommend early 1hr GTT  - recommend ASA 81mg after 12 weeks : yes  - testing indication per ACOG starting at ( increased risk stillbirth):   37 weeks GA: - Prepregnancy BMI 35-39/9  34 weeks GA:- Pre pregnancy BMI >40    Abnormal glucose test  - failed early  exercise at least 5 days a weeks or a minimum of 150 minutes per week.  Amarilis Moreno has been compliant with monitoring blood sugars and sending in for review.  Strongly advised patient to be compliant with blood sugar monitoring as advised to minimize the potential of adverse  complications (eg. Fetal demise/stillbirth, polyhydramnios/oligohydramnios, fetal growth abnormalities, pregnancy loss, hypertensive disorders of pregnancy, indicated  delivery, etc) and potential maternal morbidities, etc.   Recommended  fasting BS <90, and 2 hr PP<120.   - Fetal nonstress test; Future    6. Class 1 obesity due to excess calories without serious comorbidity with body mass index (BMI) of 34.0 to 34.9 in adult  -pregravidum BMI34.86  -recommend early 1hr GTT  - recommend ASA 81mg after 12 weeks : yes  - Fetal nonstress test; Future      Reactive NST    Keep scheduled fetal surveillance appointment  Continue Fetal Kick Counts    Of the 30 minute duration appointment visit, Lesly Avalos CNP spent at least 50% of the face-to-face time in counseling, explanation of diagnosis, planning of further management, and answering all questions.

## 2024-08-27 ENCOUNTER — PATIENT MESSAGE (OUTPATIENT)
Dept: OBGYN CLINIC | Age: 29
End: 2024-08-27

## 2024-08-27 LAB
BASOPHILS # BLD: 0.03 K/UL (ref 0–0.2)
BASOPHILS NFR BLD: 0 % (ref 0–2)
EOSINOPHIL # BLD: 0.05 K/UL (ref 0–0.44)
EOSINOPHILS RELATIVE PERCENT: 1 % (ref 1–4)
ERYTHROCYTE [DISTWIDTH] IN BLOOD BY AUTOMATED COUNT: 14.4 % (ref 11.8–14.4)
HCT VFR BLD AUTO: 37.1 % (ref 36.3–47.1)
HGB BLD-MCNC: 11.5 G/DL (ref 11.9–15.1)
IMM GRANULOCYTES # BLD AUTO: 0.04 K/UL (ref 0–0.3)
IMM GRANULOCYTES NFR BLD: 1 %
LYMPHOCYTES NFR BLD: 1.59 K/UL (ref 1.1–3.7)
LYMPHOCYTES RELATIVE PERCENT: 20 % (ref 24–43)
MCH RBC QN AUTO: 30.7 PG (ref 25.2–33.5)
MCHC RBC AUTO-ENTMCNC: 31 G/DL (ref 28.4–34.8)
MCV RBC AUTO: 99.2 FL (ref 82.6–102.9)
MONOCYTES NFR BLD: 0.43 K/UL (ref 0.1–1.2)
MONOCYTES NFR BLD: 5 % (ref 3–12)
NEUTROPHILS NFR BLD: 73 % (ref 36–65)
NEUTS SEG NFR BLD: 5.86 K/UL (ref 1.5–8.1)
NRBC BLD-RTO: 0 PER 100 WBC
PLATELET # BLD AUTO: 198 K/UL (ref 138–453)
PMV BLD AUTO: 11.4 FL (ref 8.1–13.5)
RBC # BLD AUTO: 3.74 M/UL (ref 3.95–5.11)
WBC OTHER # BLD: 8 K/UL (ref 3.5–11.3)

## 2024-08-28 LAB
MICROORGANISM SPEC CULT: NORMAL
SERVICE CMNT-IMP: NORMAL
SPECIMEN DESCRIPTION: NORMAL

## 2024-09-04 ENCOUNTER — ROUTINE PRENATAL (OUTPATIENT)
Dept: OBGYN CLINIC | Age: 29
End: 2024-09-04
Payer: COMMERCIAL

## 2024-09-04 VITALS
SYSTOLIC BLOOD PRESSURE: 124 MMHG | DIASTOLIC BLOOD PRESSURE: 72 MMHG | BODY MASS INDEX: 34.26 KG/M2 | WEIGHT: 212.25 LBS

## 2024-09-04 DIAGNOSIS — O99.213 OBESITY AFFECTING PREGNANCY IN THIRD TRIMESTER, UNSPECIFIED OBESITY TYPE: Primary | ICD-10-CM

## 2024-09-04 DIAGNOSIS — Z98.891 HISTORY OF CESAREAN SECTION: ICD-10-CM

## 2024-09-04 DIAGNOSIS — Z3A.33 33 WEEKS GESTATION OF PREGNANCY: ICD-10-CM

## 2024-09-04 DIAGNOSIS — O09.93 HIGH-RISK PREGNANCY IN THIRD TRIMESTER: ICD-10-CM

## 2024-09-04 DIAGNOSIS — O24.419 GESTATIONAL DIABETES MELLITUS (GDM) AFFECTING PREGNANCY: ICD-10-CM

## 2024-09-04 PROCEDURE — 59025 FETAL NON-STRESS TEST: CPT | Performed by: OBSTETRICS & GYNECOLOGY

## 2024-09-04 PROCEDURE — 90471 IMMUNIZATION ADMIN: CPT | Performed by: OBSTETRICS & GYNECOLOGY

## 2024-09-04 PROCEDURE — 0502F SUBSEQUENT PRENATAL CARE: CPT | Performed by: OBSTETRICS & GYNECOLOGY

## 2024-09-04 PROCEDURE — 90715 TDAP VACCINE 7 YRS/> IM: CPT | Performed by: OBSTETRICS & GYNECOLOGY

## 2024-09-05 ENCOUNTER — ROUTINE PRENATAL (OUTPATIENT)
Dept: PERINATAL CARE | Age: 29
End: 2024-09-05
Payer: COMMERCIAL

## 2024-09-05 VITALS
DIASTOLIC BLOOD PRESSURE: 66 MMHG | TEMPERATURE: 98.2 F | WEIGHT: 212 LBS | RESPIRATION RATE: 16 BRPM | BODY MASS INDEX: 34.07 KG/M2 | HEART RATE: 78 BPM | HEIGHT: 66 IN | SYSTOLIC BLOOD PRESSURE: 126 MMHG

## 2024-09-05 DIAGNOSIS — Z3A.33 33 WEEKS GESTATION OF PREGNANCY: ICD-10-CM

## 2024-09-05 DIAGNOSIS — O99.213 OBESITY AFFECTING PREGNANCY IN THIRD TRIMESTER, UNSPECIFIED OBESITY TYPE: ICD-10-CM

## 2024-09-05 DIAGNOSIS — O24.119 PRE-EXISTING TYPE 2 DIABETES MELLITUS DURING PREGNANCY, ANTEPARTUM: Primary | ICD-10-CM

## 2024-09-05 DIAGNOSIS — Z36.4 ULTRASOUND FOR ANTENATAL SCREENING FOR FETAL GROWTH RESTRICTION: ICD-10-CM

## 2024-09-05 PROCEDURE — 93325 DOPPLER ECHO COLOR FLOW MAPG: CPT | Performed by: OBSTETRICS & GYNECOLOGY

## 2024-09-05 PROCEDURE — 76826 ECHO EXAM OF FETAL HEART: CPT | Performed by: OBSTETRICS & GYNECOLOGY

## 2024-09-05 PROCEDURE — 76827 ECHO EXAM OF FETAL HEART: CPT | Performed by: OBSTETRICS & GYNECOLOGY

## 2024-09-05 PROCEDURE — 76816 OB US FOLLOW-UP PER FETUS: CPT | Performed by: OBSTETRICS & GYNECOLOGY

## 2024-09-05 PROCEDURE — 76819 FETAL BIOPHYS PROFIL W/O NST: CPT | Performed by: OBSTETRICS & GYNECOLOGY

## 2024-09-10 ENCOUNTER — ROUTINE PRENATAL (OUTPATIENT)
Dept: OBGYN CLINIC | Age: 29
End: 2024-09-10

## 2024-09-10 VITALS — DIASTOLIC BLOOD PRESSURE: 72 MMHG | SYSTOLIC BLOOD PRESSURE: 116 MMHG | BODY MASS INDEX: 34.22 KG/M2 | WEIGHT: 212 LBS

## 2024-09-10 DIAGNOSIS — O99.213 OBESITY AFFECTING PREGNANCY IN THIRD TRIMESTER, UNSPECIFIED OBESITY TYPE: ICD-10-CM

## 2024-09-10 DIAGNOSIS — Z3A.34 34 WEEKS GESTATION OF PREGNANCY: Primary | ICD-10-CM

## 2024-09-10 DIAGNOSIS — O09.93 HIGH-RISK PREGNANCY IN THIRD TRIMESTER: ICD-10-CM

## 2024-09-10 DIAGNOSIS — Z98.891 HISTORY OF CESAREAN SECTION: ICD-10-CM

## 2024-09-10 DIAGNOSIS — O24.419 GESTATIONAL DIABETES MELLITUS (GDM) AFFECTING PREGNANCY: ICD-10-CM

## 2024-09-10 PROCEDURE — 0502F SUBSEQUENT PRENATAL CARE: CPT | Performed by: STUDENT IN AN ORGANIZED HEALTH CARE EDUCATION/TRAINING PROGRAM

## 2024-09-16 ENCOUNTER — ROUTINE PRENATAL (OUTPATIENT)
Dept: OBGYN CLINIC | Age: 29
End: 2024-09-16
Payer: COMMERCIAL

## 2024-09-16 VITALS
WEIGHT: 213.8 LBS | DIASTOLIC BLOOD PRESSURE: 60 MMHG | BODY MASS INDEX: 34.51 KG/M2 | SYSTOLIC BLOOD PRESSURE: 114 MMHG | HEART RATE: 82 BPM

## 2024-09-16 DIAGNOSIS — Z98.891 HISTORY OF CESAREAN SECTION: ICD-10-CM

## 2024-09-16 DIAGNOSIS — O99.213 OBESITY AFFECTING PREGNANCY IN THIRD TRIMESTER, UNSPECIFIED OBESITY TYPE: ICD-10-CM

## 2024-09-16 DIAGNOSIS — O24.119 PRE-EXISTING TYPE 2 DIABETES MELLITUS DURING PREGNANCY, ANTEPARTUM: Primary | ICD-10-CM

## 2024-09-16 DIAGNOSIS — O09.93 HIGH-RISK PREGNANCY IN THIRD TRIMESTER: ICD-10-CM

## 2024-09-16 PROCEDURE — 59025 FETAL NON-STRESS TEST: CPT | Performed by: OBSTETRICS & GYNECOLOGY

## 2024-09-16 PROCEDURE — 0502F SUBSEQUENT PRENATAL CARE: CPT | Performed by: OBSTETRICS & GYNECOLOGY

## 2024-09-25 ENCOUNTER — ROUTINE PRENATAL (OUTPATIENT)
Dept: OBGYN CLINIC | Age: 29
End: 2024-09-25
Payer: COMMERCIAL

## 2024-09-25 ENCOUNTER — PATIENT MESSAGE (OUTPATIENT)
Dept: OBGYN CLINIC | Age: 29
End: 2024-09-25

## 2024-09-25 ENCOUNTER — HOSPITAL ENCOUNTER (OUTPATIENT)
Age: 29
Setting detail: SPECIMEN
Discharge: HOME | End: 2024-09-25

## 2024-09-25 VITALS
WEIGHT: 213.6 LBS | BODY MASS INDEX: 34.48 KG/M2 | SYSTOLIC BLOOD PRESSURE: 124 MMHG | HEART RATE: 78 BPM | DIASTOLIC BLOOD PRESSURE: 70 MMHG

## 2024-09-25 DIAGNOSIS — O24.119 PRE-EXISTING TYPE 2 DIABETES MELLITUS DURING PREGNANCY, ANTEPARTUM: Primary | ICD-10-CM

## 2024-09-25 DIAGNOSIS — Z98.891 HISTORY OF CESAREAN SECTION: ICD-10-CM

## 2024-09-25 DIAGNOSIS — O99.213 OBESITY AFFECTING PREGNANCY IN THIRD TRIMESTER, UNSPECIFIED OBESITY TYPE: ICD-10-CM

## 2024-09-25 DIAGNOSIS — O09.93 HIGH-RISK PREGNANCY IN THIRD TRIMESTER: ICD-10-CM

## 2024-09-25 PROCEDURE — 59025 FETAL NON-STRESS TEST: CPT | Performed by: OBSTETRICS & GYNECOLOGY

## 2024-09-25 PROCEDURE — 0502F SUBSEQUENT PRENATAL CARE: CPT | Performed by: OBSTETRICS & GYNECOLOGY

## 2024-09-28 LAB
MICROORGANISM SPEC CULT: NORMAL
SPECIMEN DESCRIPTION: NORMAL

## 2024-10-03 ENCOUNTER — ROUTINE PRENATAL (OUTPATIENT)
Dept: OBGYN CLINIC | Age: 29
End: 2024-10-03

## 2024-10-03 VITALS — WEIGHT: 214 LBS | BODY MASS INDEX: 34.54 KG/M2 | DIASTOLIC BLOOD PRESSURE: 62 MMHG | SYSTOLIC BLOOD PRESSURE: 118 MMHG

## 2024-10-03 DIAGNOSIS — O24.119 PRE-EXISTING TYPE 2 DIABETES MELLITUS DURING PREGNANCY, ANTEPARTUM: ICD-10-CM

## 2024-10-03 DIAGNOSIS — O09.93 HIGH-RISK PREGNANCY IN THIRD TRIMESTER: ICD-10-CM

## 2024-10-03 DIAGNOSIS — O99.213 OBESITY AFFECTING PREGNANCY IN THIRD TRIMESTER, UNSPECIFIED OBESITY TYPE: ICD-10-CM

## 2024-10-03 DIAGNOSIS — Z3A.37 37 WEEKS GESTATION OF PREGNANCY: Primary | ICD-10-CM

## 2024-10-03 DIAGNOSIS — Z98.891 HISTORY OF CESAREAN SECTION: ICD-10-CM

## 2024-10-03 NOTE — PROGRESS NOTES
Gestational age 37w2d  Indications:  OBESITY, ABN GLUCOSE, PREGDM, HX C/S, HX GHTN  NST started @ 1:35 pm  /62  Weight 214lbs  Patient recording movements. Patient stated fetal movement present.  
FAILED (162)  Early 3 hr GTT: Failed  1hr GTT: not indicated   3hr GTT: n/a    []Breast Pump Order Signed/Sent    36 weeks US:  [x]Completed growth and fluid normal    GBS: 24   [] Positive   [x] Negative from     Apts with :  [x] Date: 24  Gestational Age: 25w3d  [x] Date: 2024 Gestational Age: 33w1d  [] Date: 24  Gestational Age: 36w1d    Apts with :  [x] Date:  Gestational Age: 29w  [x] Date: 9/10 Gestational Age:  [x] Date: 10/3 Gestational Age:37w         Counseling   - Warning signs reviewed and recommendations when to call or present to the hospital if she experiences signs or symptoms of  labor and pre-eclampsia were reviewed.   - The patient was counseled on labor and delivery. Route of delivery and counseling on vaginal, operative vaginal, and  sections were completed with the risks of each to both the patient as well as her baby. The possibility of a blood transfusion was discussed as well.   - The patient was counseled on types of analgesia during labor, the need to choose her pediatrician, and postpartum plans for contraception  - The patient was counseled on the call ahead policy. She has been instructed to call the office at anytime prior to going into the hospital if possible. Exceptions were reviewed including but not limited to: decreased fetal movement, vaginal Bleeding or hemorrhage, trauma, readily expectant delivery, or any instance where she feels 911 should be utilized.    The problem list reflects the active issues addressed during today's visit.      Patient Active Problem List    Diagnosis Date Noted    History of gestational hypertension 2022     Priority: Medium    PLTCS 22 F Apg  Wt 6#14 2022     Priority: Medium    Breech presentation, no version 2024     As of 2024       Pregestational diabetes mellitus, modified White class B 2024    History of  section 2024     Return in

## 2024-10-09 ENCOUNTER — ROUTINE PRENATAL (OUTPATIENT)
Dept: OBGYN CLINIC | Age: 29
End: 2024-10-09
Payer: COMMERCIAL

## 2024-10-09 VITALS — DIASTOLIC BLOOD PRESSURE: 74 MMHG | BODY MASS INDEX: 35.02 KG/M2 | SYSTOLIC BLOOD PRESSURE: 116 MMHG | WEIGHT: 217 LBS

## 2024-10-09 DIAGNOSIS — O99.213 OBESITY AFFECTING PREGNANCY IN THIRD TRIMESTER, UNSPECIFIED OBESITY TYPE: ICD-10-CM

## 2024-10-09 DIAGNOSIS — Z87.59 HISTORY OF GESTATIONAL HYPERTENSION: ICD-10-CM

## 2024-10-09 DIAGNOSIS — Z98.891 HISTORY OF CESAREAN SECTION: ICD-10-CM

## 2024-10-09 DIAGNOSIS — O24.119 PRE-EXISTING TYPE 2 DIABETES MELLITUS DURING PREGNANCY, ANTEPARTUM: Primary | ICD-10-CM

## 2024-10-09 DIAGNOSIS — O09.93 HIGH-RISK PREGNANCY IN THIRD TRIMESTER: ICD-10-CM

## 2024-10-09 PROCEDURE — 59025 FETAL NON-STRESS TEST: CPT | Performed by: OBSTETRICS & GYNECOLOGY

## 2024-10-09 PROCEDURE — 0502F SUBSEQUENT PRENATAL CARE: CPT | Performed by: OBSTETRICS & GYNECOLOGY

## 2024-10-09 NOTE — PROGRESS NOTES
Gestational age 38w1d  Indications  OBESITY, ABN GLUCOSE, PREGDM, HX C/S, HX GHTN   NST started @  BP @  Pulse @  Weight @  Patient recording movements. Patient stated fetal movement present

## 2024-10-09 NOTE — PROGRESS NOTES
Gestational age 38w1d  Indications: OBESITY, ABN GLUCOSE, PREGDM, HX C/S, HX GHTN   NST started @ 2:15  /74  Weight 217 lbs  Patient recording movements. Patient stated fetal movement present.

## 2024-10-10 NOTE — PROGRESS NOTES
NST (Non Stress Test) Worksheet  10/10/24    Diagnosis:  Amarilis Moreno is a 29 y.o. female    38w2d  1. Pre-existing type 2 diabetes mellitus during pregnancy, antepartum    2. Obesity affecting pregnancy in third trimester, unspecified obesity type    3. High-risk pregnancy in third trimester    4. History of  section    5. History of gestational hypertension          Indication for Testing:  Pregestational DM - diet controlled       Scheduled Procedure:  Non Stress Test (59025-CPT)      Findings:  Total time of tracing to complete testing: >20 Minutes (Minimum must be 20 minutes)  NST is  reactive  CATEGORY 1 TRACING  Variability is moderate  Baseline FHR of 130 bpm    Accelerations are identified 15 beats above the baseline for 15 minutes: Yes (>32 weeks gestation)    Accelerations are identified 10 beats above the baseline for 10 minutes: NA (28-32 weeks gestation)                  RTO 1 week FOR A FOLLOW UP APPOINTMENT WITH  TESTING.    The patient, Amarilis Moreno is a 29 y.o. female, was seen with a total time spent of 20 minutes for the visit on this date of service by the provider for  testing only. The time component had both face to face and non face to face time spent in determining the total time component.  Counseling and education regarding her diagnosis listed below and her options regarding those diagnoses were also included in determining her time component.      Diagnosis Orders   1. Pre-existing type 2 diabetes mellitus during pregnancy, antepartum        2. Obesity affecting pregnancy in third trimester, unspecified obesity type        3. High-risk pregnancy in third trimester        4. History of  section        5. History of gestational hypertension             The patient had her preventative health maintenance recommendations and follow-up reviewed with her at the completion of her visit.        Electronically signed by Servando Britt DO on 10/10/2024 at

## 2024-10-14 ENCOUNTER — TELEPHONE (OUTPATIENT)
Dept: OBGYN CLINIC | Age: 29
End: 2024-10-14

## 2024-10-14 NOTE — TELEPHONE ENCOUNTER
Pt calling wondering if a fax from [facility] faxed anything over. Pt would like a call back

## 2024-10-16 ENCOUNTER — ROUTINE PRENATAL (OUTPATIENT)
Dept: OBGYN CLINIC | Age: 29
End: 2024-10-16
Payer: COMMERCIAL

## 2024-10-16 VITALS
BODY MASS INDEX: 35.02 KG/M2 | WEIGHT: 217 LBS | HEART RATE: 80 BPM | SYSTOLIC BLOOD PRESSURE: 120 MMHG | DIASTOLIC BLOOD PRESSURE: 60 MMHG

## 2024-10-16 DIAGNOSIS — O24.119 PRE-EXISTING TYPE 2 DIABETES MELLITUS DURING PREGNANCY, ANTEPARTUM: Primary | ICD-10-CM

## 2024-10-16 DIAGNOSIS — Z87.59 HISTORY OF GESTATIONAL HYPERTENSION: ICD-10-CM

## 2024-10-16 DIAGNOSIS — O99.213 OBESITY AFFECTING PREGNANCY IN THIRD TRIMESTER, UNSPECIFIED OBESITY TYPE: ICD-10-CM

## 2024-10-16 DIAGNOSIS — O09.93 HIGH-RISK PREGNANCY IN THIRD TRIMESTER: ICD-10-CM

## 2024-10-16 DIAGNOSIS — Z98.891 HISTORY OF CESAREAN SECTION: ICD-10-CM

## 2024-10-16 PROCEDURE — 0502F SUBSEQUENT PRENATAL CARE: CPT | Performed by: OBSTETRICS & GYNECOLOGY

## 2024-10-16 PROCEDURE — 59025 FETAL NON-STRESS TEST: CPT | Performed by: OBSTETRICS & GYNECOLOGY

## 2024-10-16 NOTE — PROGRESS NOTES
Gestational age 39w1d  Indications OBESITY, ABN GLUCOSE, PREGDM, HX C/S, HX GHTN   NST started 1:31  /60  Pulse 80  Weight 217LB  Patient recording movements. Patient stated fetal movement ACTIVE

## 2024-10-17 NOTE — PROGRESS NOTES
NST (Non Stress Test) Worksheet  10/17/24    Diagnosis:  Amarilis Moreno is a 29 y.o. female    39w2d  1. Pre-existing type 2 diabetes mellitus during pregnancy, antepartum    2. Obesity affecting pregnancy in third trimester, unspecified obesity type    3. High-risk pregnancy in third trimester    4. History of  section    5. History of gestational hypertension          Indication for Testing:  Pregestational DM - diet controlled    Scheduled Procedure:  Non Stress Test (59025-CPT)      Findings:  Total time of tracing to complete testing: >20 Minutes (Minimum must be 20 minutes)  NST is  reactive  CATEGORY 1 TRACING  Variability is moderate  Baseline FHR of 130 bpm    Accelerations are identified 15 beats above the baseline for 15 minutes: Yes (>32 weeks gestation)    Accelerations are identified 10 beats above the baseline for 10 minutes: NA (28-32 weeks gestation)        RTO for postpartum care    The patient, Amarilis Moreno is a 29 y.o. female, was seen with a total time spent of 20 minutes for the visit on this date of service by the provider for  testing only. The time component had both face to face and non face to face time spent in determining the total time component.  Counseling and education regarding her diagnosis listed below and her options regarding those diagnoses were also included in determining her time component.      Diagnosis Orders   1. Pre-existing type 2 diabetes mellitus during pregnancy, antepartum  Fetal nonstress test      2. Obesity affecting pregnancy in third trimester, unspecified obesity type  Fetal nonstress test      3. High-risk pregnancy in third trimester  Fetal nonstress test      4. History of  section  Fetal nonstress test      5. History of gestational hypertension  Fetal nonstress test           The patient had her preventative health maintenance recommendations and follow-up reviewed with her at the completion of her

## 2024-10-18 DIAGNOSIS — O09.93 HIGH-RISK PREGNANCY IN THIRD TRIMESTER: ICD-10-CM

## 2024-10-18 DIAGNOSIS — O24.119 PRE-EXISTING TYPE 2 DIABETES MELLITUS DURING PREGNANCY, ANTEPARTUM: ICD-10-CM

## 2024-10-18 DIAGNOSIS — O99.213 OBESITY AFFECTING PREGNANCY IN THIRD TRIMESTER, UNSPECIFIED OBESITY TYPE: ICD-10-CM

## 2024-10-18 DIAGNOSIS — Z98.891 HISTORY OF CESAREAN SECTION: ICD-10-CM

## 2024-10-18 DIAGNOSIS — Z87.59 HISTORY OF GESTATIONAL HYPERTENSION: ICD-10-CM

## 2024-10-18 NOTE — H&P
OBSTETRICAL HISTORY AND PHYSICAL  [facility]    Date: 10/21/2024       Time: 12:44 PM   Patient Name: Amarilis Moreno     Patient : 1995  Room/Bed: OBR2/OBR2-01    Admission Date/Time: 10/21/2024  9:55 AM      CC: Scheduled  Section (Repeat)     HPI: Amarilis Moreno is a 29 y.o.  at 39w6d who presents for a scheduled repeat  section. Patient denies any fever, chills, N/V, headaches, vision changes, chest pain, shortness of breath, RUQ pain, abdominal pain, and increased swelling/tenderness in bilateral lower extremities. Patient denies any vaginal discharge and any urinary complaints. The patient reports fetal movement is present, denies contractions, denies loss of fluid, denies vaginal bleeding.    Her pregnancy has been complicated by pregestational diabetes and a history of C/S x 1.         DATING:  LMP: Patient's last menstrual period was 2024 (exact date).  Estimated Date of Delivery: 10/22/24   Based on: Early ultrasound at 10 weeks 1 days gestation    PREGNANCY RISK FACTORS:  Patient Active Problem List   Diagnosis    History of gestational hypertension    PLTCS 22 F Apg 8/9 Wt 6#14    History of  section    Pregestational diabetes mellitus, modified White class B    Breech presentation, no version    39 weeks gestation of pregnancy        Steroids Given In This Pregnancy:  no     REVIEW OF SYSTEMS:  Constitutional: negative fever, chills  HEENT: negative headaches, visual disturbances  Respiratory: negative dyspnea, cough, wheezing  Cardiovascular: negative chest pain, palpitations  Gastrointestinal: negative abdominal pain, RUQ pain, N/V, diarrhea, constipation  Genitourinary: negative dysuria, frequency, hesitancy, hematuria, changes in vaginal discharge  Dermatological: negative rash  Hematologic: negative bruising  Immunologic/Lymphatic: negative recent illness, recent sick contact, LE swelling   Musculoskeletal: negative back

## 2024-10-21 ENCOUNTER — ANESTHESIA EVENT (OUTPATIENT)
Dept: LABOR AND DELIVERY | Age: 29
End: 2024-10-21
Payer: COMMERCIAL

## 2024-10-21 ENCOUNTER — ANESTHESIA (OUTPATIENT)
Dept: LABOR AND DELIVERY | Age: 29
End: 2024-10-21
Payer: COMMERCIAL

## 2024-10-21 ENCOUNTER — HOSPITAL ENCOUNTER (INPATIENT)
Age: 29
LOS: 3 days | Discharge: HOME | End: 2024-10-24
Attending: OBSTETRICS & GYNECOLOGY | Admitting: OBSTETRICS & GYNECOLOGY
Payer: COMMERCIAL

## 2024-10-21 DIAGNOSIS — Z98.890 POST-OPERATIVE STATE: Primary | ICD-10-CM

## 2024-10-21 PROBLEM — Z3A.39 39 WEEKS GESTATION OF PREGNANCY: Status: ACTIVE | Noted: 2024-10-21

## 2024-10-21 PROBLEM — Z3A.39 39 WEEKS GESTATION OF PREGNANCY: Status: RESOLVED | Noted: 2024-10-21 | Resolved: 2024-10-21

## 2024-10-21 LAB
ABO + RH BLD: NORMAL
AMPHET UR QL SCN: NEGATIVE
ARM BAND NUMBER: NORMAL
BARBITURATES UR QL SCN: NEGATIVE
BENZODIAZ UR QL: NEGATIVE
BLOOD BANK SAMPLE EXPIRATION: NORMAL
BLOOD GROUP ANTIBODIES SERPL: NEGATIVE
CANNABINOIDS UR QL SCN: NEGATIVE
COCAINE UR QL SCN: NEGATIVE
ERYTHROCYTE [DISTWIDTH] IN BLOOD BY AUTOMATED COUNT: 13.7 % (ref 11.8–14.4)
FENTANYL UR QL: NEGATIVE
HCT VFR BLD AUTO: 33.4 % (ref 36.3–47.1)
HGB BLD-MCNC: 11.3 G/DL (ref 11.9–15.1)
MCH RBC QN AUTO: 31 PG (ref 25.2–33.5)
MCHC RBC AUTO-ENTMCNC: 33.8 G/DL (ref 28.4–34.8)
MCV RBC AUTO: 91.8 FL (ref 82.6–102.9)
METHADONE UR QL: NEGATIVE
NRBC BLD-RTO: 0 PER 100 WBC
OPIATES UR QL SCN: NEGATIVE
OXYCODONE UR QL SCN: NEGATIVE
PCP UR QL SCN: NEGATIVE
PLATELET # BLD AUTO: 202 K/UL (ref 138–453)
PMV BLD AUTO: 11.9 FL (ref 8.1–13.5)
RBC # BLD AUTO: 3.64 M/UL (ref 3.95–5.11)
T PALLIDUM AB SER QL IA: NONREACTIVE
TEST INFORMATION: NORMAL
WBC OTHER # BLD: 6.7 K/UL (ref 3.5–11.3)

## 2024-10-21 PROCEDURE — 6360000002 HC RX W HCPCS: Performed by: NURSE ANESTHETIST, CERTIFIED REGISTERED

## 2024-10-21 PROCEDURE — 3700000000 HC ANESTHESIA ATTENDED CARE: Performed by: OBSTETRICS & GYNECOLOGY

## 2024-10-21 PROCEDURE — 2720000010 HC SURG SUPPLY STERILE: Performed by: OBSTETRICS & GYNECOLOGY

## 2024-10-21 PROCEDURE — 6360000002 HC RX W HCPCS

## 2024-10-21 PROCEDURE — 2500000003 HC RX 250 WO HCPCS

## 2024-10-21 PROCEDURE — 86850 RBC ANTIBODY SCREEN: CPT

## 2024-10-21 PROCEDURE — 3700000001 HC ADD 15 MINUTES (ANESTHESIA): Performed by: OBSTETRICS & GYNECOLOGY

## 2024-10-21 PROCEDURE — 7100000000 HC PACU RECOVERY - FIRST 15 MIN: Performed by: OBSTETRICS & GYNECOLOGY

## 2024-10-21 PROCEDURE — 86901 BLOOD TYPING SEROLOGIC RH(D): CPT

## 2024-10-21 PROCEDURE — 6370000000 HC RX 637 (ALT 250 FOR IP)

## 2024-10-21 PROCEDURE — 2580000003 HC RX 258

## 2024-10-21 PROCEDURE — 1220000000 HC SEMI PRIVATE OB R&B

## 2024-10-21 PROCEDURE — 80307 DRUG TEST PRSMV CHEM ANLYZR: CPT

## 2024-10-21 PROCEDURE — 7100000001 HC PACU RECOVERY - ADDTL 15 MIN: Performed by: OBSTETRICS & GYNECOLOGY

## 2024-10-21 PROCEDURE — 86900 BLOOD TYPING SEROLOGIC ABO: CPT

## 2024-10-21 PROCEDURE — 6360000002 HC RX W HCPCS: Performed by: ANESTHESIOLOGY

## 2024-10-21 PROCEDURE — 85027 COMPLETE CBC AUTOMATED: CPT

## 2024-10-21 PROCEDURE — 2580000003 HC RX 258: Performed by: NURSE ANESTHETIST, CERTIFIED REGISTERED

## 2024-10-21 PROCEDURE — 2709999900 HC NON-CHARGEABLE SUPPLY: Performed by: OBSTETRICS & GYNECOLOGY

## 2024-10-21 PROCEDURE — 2500000003 HC RX 250 WO HCPCS: Performed by: NURSE ANESTHETIST, CERTIFIED REGISTERED

## 2024-10-21 PROCEDURE — 59510 CESAREAN DELIVERY: CPT | Performed by: OBSTETRICS & GYNECOLOGY

## 2024-10-21 PROCEDURE — 3609079900 HC CESAREAN SECTION: Performed by: OBSTETRICS & GYNECOLOGY

## 2024-10-21 PROCEDURE — 86780 TREPONEMA PALLIDUM: CPT

## 2024-10-21 RX ORDER — SIMETHICONE 80 MG
80 TABLET,CHEWABLE ORAL EVERY 6 HOURS PRN
Status: DISCONTINUED | OUTPATIENT
Start: 2024-10-21 | End: 2024-10-24 | Stop reason: HOSPADM

## 2024-10-21 RX ORDER — SODIUM CHLORIDE 0.9 % (FLUSH) 0.9 %
5-40 SYRINGE (ML) INJECTION EVERY 12 HOURS SCHEDULED
Status: DISCONTINUED | OUTPATIENT
Start: 2024-10-21 | End: 2024-10-24 | Stop reason: HOSPADM

## 2024-10-21 RX ORDER — TRANEXAMIC ACID 10 MG/ML
INJECTION, SOLUTION INTRAVENOUS
Status: DISCONTINUED | OUTPATIENT
Start: 2024-10-21 | End: 2024-10-21 | Stop reason: SDUPTHER

## 2024-10-21 RX ORDER — ONDANSETRON 4 MG/1
4 TABLET, ORALLY DISINTEGRATING ORAL EVERY 8 HOURS PRN
Status: DISCONTINUED | OUTPATIENT
Start: 2024-10-21 | End: 2024-10-24 | Stop reason: HOSPADM

## 2024-10-21 RX ORDER — IBUPROFEN 600 MG/1
600 TABLET, FILM COATED ORAL EVERY 6 HOURS PRN
Qty: 40 TABLET | Refills: 1 | Status: SHIPPED | OUTPATIENT
Start: 2024-10-21

## 2024-10-21 RX ORDER — ONDANSETRON 2 MG/ML
4 INJECTION INTRAMUSCULAR; INTRAVENOUS EVERY 6 HOURS PRN
Status: DISCONTINUED | OUTPATIENT
Start: 2024-10-21 | End: 2024-10-24 | Stop reason: HOSPADM

## 2024-10-21 RX ORDER — SODIUM CHLORIDE 0.9 % (FLUSH) 0.9 %
5-40 SYRINGE (ML) INJECTION EVERY 12 HOURS SCHEDULED
Status: DISCONTINUED | OUTPATIENT
Start: 2024-10-21 | End: 2024-10-21

## 2024-10-21 RX ORDER — BISACODYL 10 MG
10 SUPPOSITORY, RECTAL RECTAL DAILY PRN
Status: DISCONTINUED | OUTPATIENT
Start: 2024-10-21 | End: 2024-10-24 | Stop reason: HOSPADM

## 2024-10-21 RX ORDER — ENOXAPARIN SODIUM 100 MG/ML
40 INJECTION SUBCUTANEOUS DAILY
Status: DISCONTINUED | OUTPATIENT
Start: 2024-10-22 | End: 2024-10-24 | Stop reason: HOSPADM

## 2024-10-21 RX ORDER — ONDANSETRON 2 MG/ML
4 INJECTION INTRAMUSCULAR; INTRAVENOUS EVERY 6 HOURS PRN
Status: DISCONTINUED | OUTPATIENT
Start: 2024-10-21 | End: 2024-10-21

## 2024-10-21 RX ORDER — VITAMIN A, ASCORBIC ACID, CHOLECALCIFEROL, .ALPHA.-TOCOPHEROL ACETATE, DL-, THIAMINE MONONITRATE, RIBOFLAVIN, NIACINAMIDE, PYRIDOXINE HYDROCHLORIDE, FOLIC ACID, CYANOCOBALAMIN, CALCIUM CARBONATE, IRON, ZINC OXIDE, AND CUPRIC OXIDE 4000; 120; 400; 22; 1.84; 3; 20; 10; 1; 12; 200; 29; 25; 2 [IU]/1; MG/1; [IU]/1; [IU]/1; MG/1; MG/1; MG/1; MG/1; MG/1; UG/1; MG/1; MG/1; MG/1; MG/1
1 TABLET ORAL DAILY
Status: DISCONTINUED | OUTPATIENT
Start: 2024-10-21 | End: 2024-10-24 | Stop reason: HOSPADM

## 2024-10-21 RX ORDER — SCOLOPAMINE TRANSDERMAL SYSTEM 1 MG/1
1 PATCH, EXTENDED RELEASE TRANSDERMAL
Status: DISCONTINUED | OUTPATIENT
Start: 2024-10-21 | End: 2024-10-24 | Stop reason: HOSPADM

## 2024-10-21 RX ORDER — SENNA AND DOCUSATE SODIUM 50; 8.6 MG/1; MG/1
2 TABLET, FILM COATED ORAL DAILY
Status: DISCONTINUED | OUTPATIENT
Start: 2024-10-22 | End: 2024-10-24 | Stop reason: HOSPADM

## 2024-10-21 RX ORDER — SODIUM CHLORIDE 0.9 % (FLUSH) 0.9 %
10 SYRINGE (ML) INJECTION PRN
Status: DISCONTINUED | OUTPATIENT
Start: 2024-10-21 | End: 2024-10-21

## 2024-10-21 RX ORDER — ONDANSETRON 2 MG/ML
4 INJECTION INTRAMUSCULAR; INTRAVENOUS EVERY 6 HOURS PRN
Status: DISCONTINUED | OUTPATIENT
Start: 2024-10-21 | End: 2024-10-21 | Stop reason: SDUPTHER

## 2024-10-21 RX ORDER — CEPHALEXIN 500 MG/1
500 CAPSULE ORAL EVERY 8 HOURS SCHEDULED
Status: COMPLETED | OUTPATIENT
Start: 2024-10-22 | End: 2024-10-22

## 2024-10-21 RX ORDER — DEXAMETHASONE SODIUM PHOSPHATE 10 MG/ML
INJECTION, SOLUTION INTRAMUSCULAR; INTRAVENOUS
Status: DISCONTINUED | OUTPATIENT
Start: 2024-10-21 | End: 2024-10-21 | Stop reason: SDUPTHER

## 2024-10-21 RX ORDER — SODIUM CHLORIDE, SODIUM LACTATE, POTASSIUM CHLORIDE, AND CALCIUM CHLORIDE .6; .31; .03; .02 G/100ML; G/100ML; G/100ML; G/100ML
1000 INJECTION, SOLUTION INTRAVENOUS ONCE
Status: COMPLETED | OUTPATIENT
Start: 2024-10-21 | End: 2024-10-21

## 2024-10-21 RX ORDER — POLYETHYLENE GLYCOL 3350 17 G/17G
17 POWDER, FOR SOLUTION ORAL DAILY
Status: DISCONTINUED | OUTPATIENT
Start: 2024-10-21 | End: 2024-10-24 | Stop reason: HOSPADM

## 2024-10-21 RX ORDER — ACETAMINOPHEN 500 MG
1000 TABLET ORAL EVERY 6 HOURS
Status: DISCONTINUED | OUTPATIENT
Start: 2024-10-21 | End: 2024-10-24 | Stop reason: HOSPADM

## 2024-10-21 RX ORDER — OXYCODONE HYDROCHLORIDE 5 MG/1
5 TABLET ORAL EVERY 4 HOURS PRN
Status: DISCONTINUED | OUTPATIENT
Start: 2024-10-21 | End: 2024-10-24 | Stop reason: HOSPADM

## 2024-10-21 RX ORDER — DIPHENHYDRAMINE HYDROCHLORIDE 50 MG/ML
INJECTION INTRAMUSCULAR; INTRAVENOUS
Status: DISCONTINUED | OUTPATIENT
Start: 2024-10-21 | End: 2024-10-21 | Stop reason: SDUPTHER

## 2024-10-21 RX ORDER — METRONIDAZOLE 500 MG/1
500 TABLET ORAL EVERY 8 HOURS SCHEDULED
Status: DISPENSED | OUTPATIENT
Start: 2024-10-21 | End: 2024-10-23

## 2024-10-21 RX ORDER — SODIUM CHLORIDE 9 MG/ML
INJECTION, SOLUTION INTRAVENOUS PRN
Status: DISCONTINUED | OUTPATIENT
Start: 2024-10-21 | End: 2024-10-24 | Stop reason: HOSPADM

## 2024-10-21 RX ORDER — SENNA AND DOCUSATE SODIUM 50; 8.6 MG/1; MG/1
1 TABLET, FILM COATED ORAL 2 TIMES DAILY
Qty: 30 TABLET | Refills: 1 | Status: SHIPPED | OUTPATIENT
Start: 2024-10-21

## 2024-10-21 RX ORDER — KETOROLAC TROMETHAMINE 30 MG/ML
30 INJECTION, SOLUTION INTRAMUSCULAR; INTRAVENOUS EVERY 6 HOURS
Status: COMPLETED | OUTPATIENT
Start: 2024-10-21 | End: 2024-10-22

## 2024-10-21 RX ORDER — IBUPROFEN 800 MG/1
800 TABLET, FILM COATED ORAL EVERY 8 HOURS
Status: DISCONTINUED | OUTPATIENT
Start: 2024-10-22 | End: 2024-10-24 | Stop reason: HOSPADM

## 2024-10-21 RX ORDER — SODIUM CHLORIDE, SODIUM LACTATE, POTASSIUM CHLORIDE, CALCIUM CHLORIDE 600; 310; 30; 20 MG/100ML; MG/100ML; MG/100ML; MG/100ML
INJECTION, SOLUTION INTRAVENOUS CONTINUOUS
Status: DISCONTINUED | OUTPATIENT
Start: 2024-10-21 | End: 2024-10-21

## 2024-10-21 RX ORDER — NALOXONE HYDROCHLORIDE 0.4 MG/ML
INJECTION, SOLUTION INTRAMUSCULAR; INTRAVENOUS; SUBCUTANEOUS PRN
Status: DISCONTINUED | OUTPATIENT
Start: 2024-10-21 | End: 2024-10-21

## 2024-10-21 RX ORDER — CITRIC ACID/SODIUM CITRATE 334-500MG
30 SOLUTION, ORAL ORAL ONCE
Status: COMPLETED | OUTPATIENT
Start: 2024-10-21 | End: 2024-10-21

## 2024-10-21 RX ORDER — OXYCODONE HYDROCHLORIDE 5 MG/1
10 TABLET ORAL EVERY 4 HOURS PRN
Status: DISCONTINUED | OUTPATIENT
Start: 2024-10-21 | End: 2024-10-24 | Stop reason: HOSPADM

## 2024-10-21 RX ORDER — SODIUM CHLORIDE, SODIUM LACTATE, POTASSIUM CHLORIDE, CALCIUM CHLORIDE 600; 310; 30; 20 MG/100ML; MG/100ML; MG/100ML; MG/100ML
INJECTION, SOLUTION INTRAVENOUS
Status: DISCONTINUED | OUTPATIENT
Start: 2024-10-21 | End: 2024-10-21 | Stop reason: SDUPTHER

## 2024-10-21 RX ORDER — SODIUM CHLORIDE 9 MG/ML
INJECTION, SOLUTION INTRAVENOUS PRN
Status: DISCONTINUED | OUTPATIENT
Start: 2024-10-21 | End: 2024-10-21

## 2024-10-21 RX ORDER — BUPIVACAINE HYDROCHLORIDE 7.5 MG/ML
INJECTION, SOLUTION INTRASPINAL
Status: COMPLETED | OUTPATIENT
Start: 2024-10-21 | End: 2024-10-21

## 2024-10-21 RX ORDER — SODIUM CHLORIDE 0.9 % (FLUSH) 0.9 %
5-40 SYRINGE (ML) INJECTION PRN
Status: DISCONTINUED | OUTPATIENT
Start: 2024-10-21 | End: 2024-10-24 | Stop reason: HOSPADM

## 2024-10-21 RX ORDER — ACETAMINOPHEN 325 MG/1
975 TABLET ORAL ONCE
Status: COMPLETED | OUTPATIENT
Start: 2024-10-21 | End: 2024-10-21

## 2024-10-21 RX ORDER — KETOROLAC TROMETHAMINE 30 MG/ML
INJECTION, SOLUTION INTRAMUSCULAR; INTRAVENOUS
Status: DISCONTINUED | OUTPATIENT
Start: 2024-10-21 | End: 2024-10-21 | Stop reason: SDUPTHER

## 2024-10-21 RX ORDER — ACETAMINOPHEN 500 MG
1000 TABLET ORAL EVERY 6 HOURS PRN
Qty: 30 TABLET | Refills: 1 | Status: SHIPPED | OUTPATIENT
Start: 2024-10-21

## 2024-10-21 RX ORDER — TRANEXAMIC ACID 10 MG/ML
1000 INJECTION, SOLUTION INTRAVENOUS ONCE
Status: DISCONTINUED | OUTPATIENT
Start: 2024-10-21 | End: 2024-10-21

## 2024-10-21 RX ORDER — OXYCODONE HYDROCHLORIDE 5 MG/1
5 TABLET ORAL EVERY 6 HOURS PRN
Qty: 20 TABLET | Refills: 0 | Status: SHIPPED | OUTPATIENT
Start: 2024-10-21 | End: 2024-10-26

## 2024-10-21 RX ORDER — MORPHINE SULFATE 1 MG/ML
INJECTION, SOLUTION EPIDURAL; INTRATHECAL; INTRAVENOUS
Status: COMPLETED | OUTPATIENT
Start: 2024-10-21 | End: 2024-10-21

## 2024-10-21 RX ADMIN — DIPHENHYDRAMINE HYDROCHLORIDE 12.5 MG: 50 INJECTION INTRAMUSCULAR; INTRAVENOUS at 12:50

## 2024-10-21 RX ADMIN — Medication 2000 MG: at 22:12

## 2024-10-21 RX ADMIN — MORPHINE SULFATE 0.2 MG: 1 INJECTION, SOLUTION EPIDURAL; INTRATHECAL; INTRAVENOUS at 12:41

## 2024-10-21 RX ADMIN — METRONIDAZOLE 500 MG: 500 TABLET ORAL at 22:12

## 2024-10-21 RX ADMIN — OXYCODONE 5 MG: 5 TABLET ORAL at 22:14

## 2024-10-21 RX ADMIN — BUPIVACAINE HYDROCHLORIDE IN DEXTROSE 15 MG: 7.5 INJECTION, SOLUTION SUBARACHNOID at 12:41

## 2024-10-21 RX ADMIN — SODIUM CITRATE AND CITRIC ACID MONOHYDRATE 30 ML: 500; 334 SOLUTION ORAL at 10:57

## 2024-10-21 RX ADMIN — Medication 2000 MG: at 11:44

## 2024-10-21 RX ADMIN — PHENYLEPHRINE HYDROCHLORIDE 50 MCG/MIN: 10 INJECTION INTRAVENOUS at 12:50

## 2024-10-21 RX ADMIN — OXYCODONE 10 MG: 5 TABLET ORAL at 18:09

## 2024-10-21 RX ADMIN — Medication 87.3 MILLI-UNITS/MIN: at 14:02

## 2024-10-21 RX ADMIN — ACETAMINOPHEN 1000 MG: 500 TABLET ORAL at 22:12

## 2024-10-21 RX ADMIN — SODIUM CHLORIDE, POTASSIUM CHLORIDE, SODIUM LACTATE AND CALCIUM CHLORIDE 1000 ML: 600; 310; 30; 20 INJECTION, SOLUTION INTRAVENOUS at 10:51

## 2024-10-21 RX ADMIN — SODIUM CHLORIDE, POTASSIUM CHLORIDE, SODIUM LACTATE AND CALCIUM CHLORIDE: 600; 310; 30; 20 INJECTION, SOLUTION INTRAVENOUS at 11:28

## 2024-10-21 RX ADMIN — Medication 909 ML/HR: at 13:07

## 2024-10-21 RX ADMIN — SODIUM CHLORIDE, PRESERVATIVE FREE 20 MG: 5 INJECTION INTRAVENOUS at 10:57

## 2024-10-21 RX ADMIN — KETOROLAC TROMETHAMINE 30 MG: 30 INJECTION, SOLUTION INTRAMUSCULAR; INTRAVENOUS at 19:36

## 2024-10-21 RX ADMIN — KETOROLAC TROMETHAMINE 30 MG: 30 INJECTION, SOLUTION INTRAMUSCULAR; INTRAVENOUS at 13:34

## 2024-10-21 RX ADMIN — TRANEXAMIC ACID 1 G: 10 INJECTION, SOLUTION INTRAVENOUS at 12:48

## 2024-10-21 RX ADMIN — ACETAMINOPHEN 975 MG: 325 TABLET ORAL at 10:57

## 2024-10-21 RX ADMIN — SODIUM CHLORIDE, POTASSIUM CHLORIDE, SODIUM LACTATE AND CALCIUM CHLORIDE: 600; 310; 30; 20 INJECTION, SOLUTION INTRAVENOUS at 12:22

## 2024-10-21 RX ADMIN — PHENYLEPHRINE HYDROCHLORIDE 100 MCG: 10 INJECTION INTRAVENOUS at 12:49

## 2024-10-21 RX ADMIN — ACETAMINOPHEN 1000 MG: 500 TABLET ORAL at 16:21

## 2024-10-21 RX ADMIN — TRANEXAMIC ACID 1000 MG: 10 INJECTION, SOLUTION INTRAVENOUS at 12:30

## 2024-10-21 RX ADMIN — DEXAMETHASONE SODIUM PHOSPHATE 10 MG: 10 INJECTION, SOLUTION INTRAMUSCULAR; INTRAVENOUS at 12:50

## 2024-10-21 ASSESSMENT — PAIN SCALES - GENERAL
PAINLEVEL_OUTOF10: 5
PAINLEVEL_OUTOF10: 0
PAINLEVEL_OUTOF10: 4
PAINLEVEL_OUTOF10: 6

## 2024-10-21 ASSESSMENT — PAIN DESCRIPTION - PAIN TYPE: TYPE: SURGICAL PAIN

## 2024-10-21 ASSESSMENT — PAIN DESCRIPTION - LOCATION
LOCATION: ABDOMEN;INCISION
LOCATION: ABDOMEN
LOCATION: ABDOMEN

## 2024-10-21 ASSESSMENT — PAIN DESCRIPTION - ORIENTATION
ORIENTATION: MID
ORIENTATION: MID

## 2024-10-21 ASSESSMENT — PAIN DESCRIPTION - DESCRIPTORS
DESCRIPTORS: DISCOMFORT;CRAMPING
DESCRIPTORS: DISCOMFORT;CRAMPING

## 2024-10-21 NOTE — OP NOTE
Operative Note  Department of Obstetrics and Gynecology  [facility]     Patient: Amarilis Moreno   : 1995  MRN: [id number]       Acct: [id number]   PCP: No primary care provider on file.  Date of Procedure: 10/21/24    Pre-operative Diagnosis: 29 y.o. female  at 39w6d   Pregestational Diabetes Mellitus  History of  Section x1  History of Gestational Hypertension  BMI 35      Post-operative Diagnosis: 29 y.o. female  at 39w6d   Pregestational Diabetes Mellitus  History of  Section x1  History of Gestational Hypertension  BMI 35    Female Infant  Thin Lower Uterine segment w/o dehiscence       Procedure: repeat low transverse  section    Indications: Patient is a 29 year old  @ 39w6d who presents for a scheduled repeat  Section (CS) as she declines a trial of labor after  section. Patient has a history of  Section x1 in  secondary to failure to progress in the 1st stage. At this time, repeat  section will proceed.     Surgeon: Dr. Britt  Assistants: Mary Mazariegos, PGY3, Ginny Wagoner, PGY1    Anesthesia: spinal with duramorph    Procedure Details   The patient was seen pre-operatively. The risks, benefits, complications, treatment options, and expected outcomes were discussed with the patient. The patient concurred with the proposed plan, giving informed consent. The patient was taken to the Operating Room, identified as Amarilis Moreno and the procedure verified as  Delivery. A Time Out was held and the above information confirmed.     After spinal anesthesia, the patient was draped and prepped in the usual sterile manner. A Pfannenstiel incision was made and carried down through the subcutaneous tissue to the fascia using scalpel. Fascial incision was made and extended transversely using slater scissors for sharp dissection. The fascia was  from the underlying rectus tissue superiorly

## 2024-10-21 NOTE — CARE COORDINATION
ANTEPARTUM NOTE    History of  delivery [Z98.891]  39 weeks gestation of pregnancy [Z3A.39]    Amarilis was admitted to L&D on 10/21/24 for scheduled repeat CS @ 39w6d    OB GYN Provider: Dr. Britt    Will meet with patient after delivery to verify name/address/phone/insurance and discuss discharge planning.     Anticipate DC home 2 nights after vaginal delivery or 4 nights after C/S delivery as long as hemodynamically stable.

## 2024-10-21 NOTE — BRIEF OP NOTE
Department of Obstetrics and Gynecology  Obstetrical Brief Operative Report  [facility]    Patient: Amarilis Moreno   : 1995  MRN: [id number]       Acct: [id number]   Date of Procedure: 10/21/24    Pre-operative Diagnosis: 29 y.o. female  at 39w6d   Pregestational Diabetes Mellitus  History of  Section x1  History of Gestational Hypertension  BMI 35     Post-operative Diagnosis: 29 y.o. female  at 39w6d   Pregestational Diabetes Mellitus  History of  Section x1  History of Gestational Hypertension  BMI 35   [place] Female Infant    Procedure: repeat low transverse  section    Surgeon: Dr. Britt  Assistant(s): Mary Mazariegos, PGY3; Ginny Wagoner, PGY1    Anesthesia: spinal with Duramorph    Information for the patient's :  Pola Moreno [[id number]]   female   Birth Weight: 3.375 kg (7 lb 7.1 oz)  Information for the patient's :  Pola Moreno [[id number]]        Findings:  Live Born 7 lb 7 oz female infant in cephalic presentation with Apgars of 8 at 1 minute and 9 at five minutes, normal appearing uterus tubes and ovaries   Estimated Blood Loss: QBL pending  Total IV Fluids: 1000ml  Urine output: 800ml clear urine   Drains:  davis catheter  Specimens:  cord blood and cord gases  Instrument and Sponge Count: Correct  Complications: none  Condition: Infant stable, transfer to General Care Nursery, Mother stable, transfer to post anesthesia recovery    See operative report for full details.    Mary Mazariegos MD  Ob/Gyn Resident  10/21/2024, 2:01 PM

## 2024-10-21 NOTE — PLAN OF CARE
Problem: Postpartum  Goal: Experiences normal postpartum course  Description:  Postpartum OB-Pregnancy care plan goal which identifies if the mother is experiencing a normal postpartum course  Outcome: Progressing  Goal: Appropriate maternal -  bonding  Description:  Postpartum OB-Pregnancy care plan goal which identifies if the mother and  are bonding appropriately  Outcome: Progressing  Goal: Establishment of infant feeding pattern  Description:  Postpartum OB-Pregnancy care plan goal which identifies if the mother is establishing a feeding pattern with their   Outcome: Progressing  Goal: Incisions, wounds, or drain sites healing without S/S of infection  Outcome: Progressing     Problem: Pain  Goal: Verbalizes/displays adequate comfort level or baseline comfort level  Outcome: Progressing     Problem: Infection - Adult  Goal: Absence of infection at discharge  Outcome: Progressing  Goal: Absence of infection during hospitalization  Outcome: Progressing  Goal: Absence of fever/infection during anticipated neutropenic period  Outcome: Progressing     Problem: Safety - Adult  Goal: Free from fall injury  Outcome: Progressing     Problem: Discharge Planning  Goal: Discharge to home or other facility with appropriate resources  Outcome: Progressing     Problem: Chronic Conditions and Co-morbidities  Goal: Patient's chronic conditions and co-morbidity symptoms are monitored and maintained or improved  Outcome: Progressing  Plans of care initiated.

## 2024-10-21 NOTE — ANESTHESIA PRE PROCEDURE
Department of Anesthesiology  Preprocedure Note       Name:  Amarilis Moreno   Age:  29 y.o.  :  1995                                          MRN:  [id number]         Date:  10/21/2024      Surgeon: Surgeon(s):  Servando Britt DO    Procedure: Procedure(s):   SECTION    Medications prior to admission:   Prior to Admission medications    Medication Sig Start Date End Date Taking? Authorizing Provider   Docosahexaenoic Acid (PRENATAL DHA) 200 MG CAPS Take by mouth   Yes Provider, MD Virgil   aspirin 81 MG EC tablet Take 1 tablet by mouth daily 3/27/24  Yes Lesly Avalos APRN - CNP   Alcohol Swabs 70 % PADS 200 each by Does not apply route in the morning, at noon, in the evening, and at bedtime for 200 doses 5/9/24 10/9/24  Lesly Avalos APRN - CNP   blood glucose monitor strips Test 4 times a day & as needed for symptoms of irregular blood glucose. Dispense sufficient amount for indicated testing frequency plus additional to accommodate PRN testing needs. 24   Lesly Avalos APRN - CNP   Lancets MISC 200 each by Does not apply route 4 times daily 24   Lesly Avalos APRN - CNP   Blood Glucose Monitoring Suppl (FREESTYLE LITE) LUCY 1 Device by Does not apply route daily as needed (DAILY PRN- for checking glucose fasting and 2 hours after breakfast, lunch and dinner.) 24   Lesly Avalos APRN - CNP       Current medications:    Current Facility-Administered Medications   Medication Dose Route Frequency Provider Last Rate Last Admin    lactated ringers IV soln infusion SOLN   IntraVENous Continuous Agnes Wagoner DO        lactated ringers bolus 1,000 mL  1,000 mL IntraVENous Once Agnes Wagoner DO 1,000 mL/hr at 10/21/24 1051 1,000 mL at 10/21/24 1051    sodium chloride flush 0.9 % injection 5-40 mL  5-40 mL IntraVENous 2 times per day Agnes Wagoner DO        sodium chloride flush 0.9 % injection 10 mL  10 mL IntraVENous PRN Ciaran

## 2024-10-21 NOTE — DISCHARGE SUMMARY
Obstetric Discharge Summary  [facility]    Patient Name: Amarilis Moreno  Patient : 1995  Primary Care Physician: No primary care provider on file.  Admit Date: 10/21/2024    Principal Diagnosis: IUP at 39w6d, admitted for Scheduled Repeat CS     Her pregnancy has been complicated by:   Patient Active Problem List   Diagnosis    History of gestational hypertension (G1)    PLTCS 22 F Apg 8/9 Wt 6#14    Hx C/S x2    Pregestational diabetes mellitus, modified White class B    RLTCS 10/21/24 F Apg 8/9 Wt 7#7    Post-operative state       Infection Present?: No  Hospital Acquired: No    Surgical Operations & Procedures:  Analgesia: spinal  Delivery Type:  Delivery: RLTCS   Laceration(s): See L&D Summary    Consultations: NICU, and Anesthesia    Pertinent Findings & Procedures:   Amarilis Moreno is a 29 y.o. female  at 39w6d admitted for Scheduled Repeat CS. Has history of CS x1 and declines TOLAC; received Ancef, Bicitra, Tylenol, Pepcid, Scop Patch, TXA x1 pre-op.     She delivered by repeat low transverse  a Live Born infant on 10/21/24.       Information for the patient's :  Pola Moreno [[id number]]   female   Birth Weight: 3.375 kg (7 lb 7.1 oz)    Apgars: 8 at 1 minute and 9 at 5 minutes.     ERAS for  Section  Received ERAS education outpatient: No  Consumed electrolyte-rich clear fluid prior to surgery: No  Received pre-operative Tylenol and Pepcid: Yes  Received Scopolamine patch: Yes  Received post-operative scheduled Motrin and Tylenol: Yes  Meng catheter discontinued 12 hours post-operatively: Yes        ERAS requirements met (3/6): Yes    Postpartum course: She received Ancef>Keflex and Flagyl x48hrs and Lovenox 40mg QD  POD #1: Hgb 9.8. Rx iron sent    Course of patient: uncomplicated    Discharge to: Home    Readmission planned: no     Indication for 6 week PP 2 hour GTT?: yes, Pregestational DM    Eligible for 2 week PP virtual

## 2024-10-21 NOTE — ANESTHESIA PROCEDURE NOTES
Spinal Block    Patient location during procedure: OB  Reason for block: procedure for pain, post-op pain management, primary anesthetic and at surgeon's request  Staffing  Performed: anesthesiologist   Anesthesiologist: Tanvir Lopez MD  Performed by: Kuldeep Saez APRN - CRNA  Authorized by: Tanvir Lopez MD    Spinal Block  Patient position: sitting  Prep: Betadine  Patient monitoring: continuous pulse ox and frequent blood pressure checks  Approach: midline  Location: L3/L4  Guidance: paresthesia technique  Provider prep: sterile gloves and mask  Local infiltration: lidocaine  Needle  Needle type: Narayan   Needle gauge: 24 G  Needle length: 4 in  Needle insertion depth: 8 cm  Assessment  Sensory level: T4  Swirl obtained: Yes  CSF: clear  Attempts: 1  Hemodynamics: stable  Preanesthetic Checklist  Completed: patient identified, IV checked, site marked, risks and benefits discussed, surgical/procedural consents, equipment checked, pre-op evaluation, timeout performed, anesthesia consent given, oxygen available, monitors applied/VS acknowledged, fire risk safety assessment completed and verbalized and blood product R/B/A discussed and consented

## 2024-10-22 PROBLEM — Z98.890 POST-OPERATIVE STATE: Status: ACTIVE | Noted: 2024-10-22

## 2024-10-22 LAB
ERYTHROCYTE [DISTWIDTH] IN BLOOD BY AUTOMATED COUNT: 13.9 % (ref 11.8–14.4)
GLUCOSE BLD-MCNC: 95 MG/DL (ref 65–105)
HCT VFR BLD AUTO: 29.2 % (ref 36.3–47.1)
HGB BLD-MCNC: 9.8 G/DL (ref 11.9–15.1)
MCH RBC QN AUTO: 31.7 PG (ref 25.2–33.5)
MCHC RBC AUTO-ENTMCNC: 33.6 G/DL (ref 28.4–34.8)
MCV RBC AUTO: 94.5 FL (ref 82.6–102.9)
NRBC BLD-RTO: 0 PER 100 WBC
PLATELET # BLD AUTO: 176 K/UL (ref 138–453)
PMV BLD AUTO: 11.6 FL (ref 8.1–13.5)
RBC # BLD AUTO: 3.09 M/UL (ref 3.95–5.11)
WBC OTHER # BLD: 11.2 K/UL (ref 3.5–11.3)

## 2024-10-22 PROCEDURE — 2580000003 HC RX 258

## 2024-10-22 PROCEDURE — 6370000000 HC RX 637 (ALT 250 FOR IP)

## 2024-10-22 PROCEDURE — 1220000000 HC SEMI PRIVATE OB R&B

## 2024-10-22 PROCEDURE — 59510 CESAREAN DELIVERY: CPT | Performed by: STUDENT IN AN ORGANIZED HEALTH CARE EDUCATION/TRAINING PROGRAM

## 2024-10-22 PROCEDURE — 36415 COLL VENOUS BLD VENIPUNCTURE: CPT

## 2024-10-22 PROCEDURE — 6360000002 HC RX W HCPCS

## 2024-10-22 PROCEDURE — 85027 COMPLETE CBC AUTOMATED: CPT

## 2024-10-22 PROCEDURE — 82947 ASSAY GLUCOSE BLOOD QUANT: CPT

## 2024-10-22 RX ORDER — FERROUS SULFATE 325(65) MG
325 TABLET ORAL 2 TIMES DAILY
Qty: 180 TABLET | Refills: 3 | Status: SHIPPED | OUTPATIENT
Start: 2024-10-22

## 2024-10-22 RX ORDER — LANOLIN ALCOHOL/MO/W.PET/CERES
3 CREAM (GRAM) TOPICAL NIGHTLY PRN
Status: DISCONTINUED | OUTPATIENT
Start: 2024-10-22 | End: 2024-10-24 | Stop reason: HOSPADM

## 2024-10-22 RX ADMIN — METRONIDAZOLE 500 MG: 500 TABLET ORAL at 05:21

## 2024-10-22 RX ADMIN — MAGNESIUM HYDROXIDE 30 ML: 400 SUSPENSION ORAL at 09:06

## 2024-10-22 RX ADMIN — SIMETHICONE 80 MG: 80 TABLET, CHEWABLE ORAL at 21:53

## 2024-10-22 RX ADMIN — Medication 1 TABLET: at 09:06

## 2024-10-22 RX ADMIN — IBUPROFEN 800 MG: 800 TABLET, FILM COATED ORAL at 20:22

## 2024-10-22 RX ADMIN — POLYETHYLENE GLYCOL 3350 17 G: 17 POWDER, FOR SOLUTION ORAL at 23:47

## 2024-10-22 RX ADMIN — METRONIDAZOLE 500 MG: 500 TABLET ORAL at 21:45

## 2024-10-22 RX ADMIN — Medication 3 MG: at 03:17

## 2024-10-22 RX ADMIN — SODIUM CHLORIDE, PRESERVATIVE FREE 10 ML: 5 INJECTION INTRAVENOUS at 01:45

## 2024-10-22 RX ADMIN — CEPHALEXIN 500 MG: 500 CAPSULE ORAL at 05:21

## 2024-10-22 RX ADMIN — SODIUM CHLORIDE, PRESERVATIVE FREE 10 ML: 5 INJECTION INTRAVENOUS at 14:05

## 2024-10-22 RX ADMIN — ENOXAPARIN SODIUM 40 MG: 100 INJECTION SUBCUTANEOUS at 09:06

## 2024-10-22 RX ADMIN — OXYCODONE 10 MG: 5 TABLET ORAL at 08:01

## 2024-10-22 RX ADMIN — KETOROLAC TROMETHAMINE 30 MG: 30 INJECTION, SOLUTION INTRAMUSCULAR; INTRAVENOUS at 14:02

## 2024-10-22 RX ADMIN — OXYCODONE 10 MG: 5 TABLET ORAL at 16:41

## 2024-10-22 RX ADMIN — Medication 2000 MG: at 05:20

## 2024-10-22 RX ADMIN — OXYCODONE 10 MG: 5 TABLET ORAL at 21:45

## 2024-10-22 RX ADMIN — OXYCODONE 10 MG: 5 TABLET ORAL at 03:20

## 2024-10-22 RX ADMIN — ACETAMINOPHEN 1000 MG: 500 TABLET ORAL at 17:39

## 2024-10-22 RX ADMIN — METRONIDAZOLE 500 MG: 500 TABLET ORAL at 14:02

## 2024-10-22 RX ADMIN — KETOROLAC TROMETHAMINE 30 MG: 30 INJECTION, SOLUTION INTRAMUSCULAR; INTRAVENOUS at 07:59

## 2024-10-22 RX ADMIN — CEPHALEXIN 500 MG: 500 CAPSULE ORAL at 14:02

## 2024-10-22 RX ADMIN — ACETAMINOPHEN 1000 MG: 500 TABLET ORAL at 23:47

## 2024-10-22 RX ADMIN — KETOROLAC TROMETHAMINE 30 MG: 30 INJECTION, SOLUTION INTRAMUSCULAR; INTRAVENOUS at 01:44

## 2024-10-22 RX ADMIN — ACETAMINOPHEN 1000 MG: 500 TABLET ORAL at 05:20

## 2024-10-22 RX ADMIN — CEPHALEXIN 500 MG: 500 CAPSULE ORAL at 21:45

## 2024-10-22 RX ADMIN — SODIUM CHLORIDE, PRESERVATIVE FREE 10 ML: 5 INJECTION INTRAVENOUS at 08:02

## 2024-10-22 RX ADMIN — SODIUM CHLORIDE, PRESERVATIVE FREE 10 ML: 5 INJECTION INTRAVENOUS at 21:45

## 2024-10-22 RX ADMIN — OXYCODONE 10 MG: 5 TABLET ORAL at 12:40

## 2024-10-22 RX ADMIN — ACETAMINOPHEN 1000 MG: 500 TABLET ORAL at 11:39

## 2024-10-22 RX ADMIN — SENNOSIDES AND DOCUSATE SODIUM 2 TABLET: 50; 8.6 TABLET ORAL at 09:06

## 2024-10-22 ASSESSMENT — PAIN DESCRIPTION - DESCRIPTORS
DESCRIPTORS: DISCOMFORT;CRAMPING
DESCRIPTORS: SORE
DESCRIPTORS: ACHING
DESCRIPTORS: ACHING
DESCRIPTORS: SORE;CRAMPING
DESCRIPTORS: SORE
DESCRIPTORS: DISCOMFORT;CRAMPING
DESCRIPTORS: ACHING
DESCRIPTORS: DISCOMFORT;CRAMPING

## 2024-10-22 ASSESSMENT — PAIN SCALES - GENERAL
PAINLEVEL_OUTOF10: 3
PAINLEVEL_OUTOF10: 7
PAINLEVEL_OUTOF10: 6
PAINLEVEL_OUTOF10: 5
PAINLEVEL_OUTOF10: 8
PAINLEVEL_OUTOF10: 6
PAINLEVEL_OUTOF10: 5
PAINLEVEL_OUTOF10: 7
PAINLEVEL_OUTOF10: 6

## 2024-10-22 ASSESSMENT — PAIN - FUNCTIONAL ASSESSMENT
PAIN_FUNCTIONAL_ASSESSMENT: ACTIVITIES ARE NOT PREVENTED

## 2024-10-22 ASSESSMENT — PAIN DESCRIPTION - ORIENTATION
ORIENTATION: MID
ORIENTATION: MID
ORIENTATION: LOWER;MID
ORIENTATION: MID

## 2024-10-22 ASSESSMENT — PAIN DESCRIPTION - LOCATION
LOCATION: ABDOMEN
LOCATION: INCISION
LOCATION: ABDOMEN
LOCATION: ABDOMEN
LOCATION: INCISION
LOCATION: ABDOMEN
LOCATION: INCISION

## 2024-10-22 NOTE — CARE COORDINATION
CASE MANAGEMENT POST-PARTUM TRANSITIONAL CARE PLAN    History of  delivery [Z98.891]  39 weeks gestation of pregnancy [Z3A.39]    OB Provider: Dr. Britt    Writer met w/ Amarilis and her  Marvel at her bedside to discuss DCP. She is S/P C/S on 10/21/24 @ 39w6d at 1306 of female infant    Writer verified address/phone number correct on facesheet. She states she lives with her  and their other daughter. She denied barriers with transportation home, to doctor's appointments or with paying for medications upon discharge home.     [employer] insurance correct. Writer notified them they have 30 days from date of birth to add  to insurance policy. They verbalized understanding.    Infant name on BC: Aracely.   Infant PCP Dr. Piper Crews.     DME: [employer]  HOME CARE: None    Anticipate DC home of couplet in private vehicle in 2-4 days status post C/S delivery.    Readmission Risk              Risk of Unplanned Readmission:  6

## 2024-10-22 NOTE — ANESTHESIA POSTPROCEDURE EVALUATION
Department of Anesthesiology  Postprocedure Note    Patient: Amarilis Moreno  MRN: [id number]  YOB: 1995  Date of evaluation: 10/22/2024    Procedure Summary       Date: 10/21/24 Room / Location: [address]    Anesthesia Start: 1222 Anesthesia Stop: 1344    Procedure:  SECTION (Abdomen) Diagnosis:       History of  delivery      (History of  delivery [Z98.891])    Surgeons: Servando Britt DO Responsible Provider: Yue De Jesus MD    Anesthesia Type: spinal, general ASA Status: 3            Anesthesia Type: No value filed.    Marvin Phase I: Marvin Score: 10    Marvin Phase II:      Anesthesia Post Evaluation    Patient location during evaluation: floor  Patient participation: complete - patient participated  Level of consciousness: awake and alert  Pain score: 2  Airway patency: patent  Nausea & Vomiting: no vomiting and no nausea  Cardiovascular status: hemodynamically stable  Respiratory status: acceptable  Hydration status: stable  Pain management: adequate    No notable events documented.

## 2024-10-22 NOTE — CARE COORDINATION
Social Work     Sw reviewed medical record (current active problem list) and tox screens and found no current concerns.     Sw spoke with mom briefly to explain Sw role, inquire if any needs or concerns, and provide safe sleep education and discuss.  Mom denied any needs or questions and informs baby has a safe sleep environment (bass and crib).     Mom denied any current s/s of anxiety or depression and is aware to reach out to OB if any s/s occur after dc.     Mom reports a really good support system and denied any current questions or needs.      Mom reports this is her 2nd child, also has a 2 year old daughter.       Mom states ped will be [facility] (Dr. Crews).      Sw encouraged mom to reach out if any issues or concerns arise.

## 2024-10-22 NOTE — ANESTHESIA POSTPROCEDURE EVALUATION
Department of Anesthesiology  Postprocedure Note    Patient: Amarilis Moreno  MRN: [id number]  YOB: 1995  Date of evaluation: 10/21/2024    Procedure Summary       Date: 10/21/24 Room / Location: [address]    Anesthesia Start: 1222 Anesthesia Stop: 1344    Procedure:  SECTION (Abdomen) Diagnosis:       History of  delivery      (History of  delivery [Z98.891])    Surgeons: Servando Britt DO Responsible Provider: Yue De Jesus MD    Anesthesia Type: spinal, general ASA Status: 3            Anesthesia Type: No value filed.    Marvin Phase I: Marvin Score: 10    Marvin Phase II:      Anesthesia Post Evaluation    Patient location during evaluation: PACU  Patient participation: complete - patient participated  Level of consciousness: awake and alert  Pain score: 3  Airway patency: patent  Nausea & Vomiting: no nausea and no vomiting  Cardiovascular status: hemodynamically stable  Respiratory status: acceptable  Hydration status: euvolemic  Pain management: adequate    No notable events documented.

## 2024-10-23 PROCEDURE — 2580000003 HC RX 258

## 2024-10-23 PROCEDURE — 6370000000 HC RX 637 (ALT 250 FOR IP)

## 2024-10-23 PROCEDURE — 6360000002 HC RX W HCPCS

## 2024-10-23 PROCEDURE — 1220000000 HC SEMI PRIVATE OB R&B

## 2024-10-23 RX ADMIN — ACETAMINOPHEN 1000 MG: 500 TABLET ORAL at 06:18

## 2024-10-23 RX ADMIN — POLYETHYLENE GLYCOL 3350 17 G: 17 POWDER, FOR SOLUTION ORAL at 12:43

## 2024-10-23 RX ADMIN — OXYCODONE 10 MG: 5 TABLET ORAL at 01:45

## 2024-10-23 RX ADMIN — IBUPROFEN 800 MG: 800 TABLET, FILM COATED ORAL at 20:59

## 2024-10-23 RX ADMIN — OXYCODONE 10 MG: 5 TABLET ORAL at 15:32

## 2024-10-23 RX ADMIN — MAGNESIUM HYDROXIDE 30 ML: 400 SUSPENSION ORAL at 08:18

## 2024-10-23 RX ADMIN — ENOXAPARIN SODIUM 40 MG: 100 INJECTION SUBCUTANEOUS at 09:40

## 2024-10-23 RX ADMIN — OXYCODONE 10 MG: 5 TABLET ORAL at 23:36

## 2024-10-23 RX ADMIN — METRONIDAZOLE 500 MG: 500 TABLET ORAL at 06:19

## 2024-10-23 RX ADMIN — ACETAMINOPHEN 1000 MG: 500 TABLET ORAL at 18:37

## 2024-10-23 RX ADMIN — OXYCODONE 10 MG: 5 TABLET ORAL at 11:25

## 2024-10-23 RX ADMIN — OXYCODONE 10 MG: 5 TABLET ORAL at 19:34

## 2024-10-23 RX ADMIN — IBUPROFEN 800 MG: 800 TABLET, FILM COATED ORAL at 12:36

## 2024-10-23 RX ADMIN — SENNOSIDES AND DOCUSATE SODIUM 2 TABLET: 50; 8.6 TABLET ORAL at 08:18

## 2024-10-23 RX ADMIN — SODIUM CHLORIDE, PRESERVATIVE FREE 10 ML: 5 INJECTION INTRAVENOUS at 08:18

## 2024-10-23 RX ADMIN — SIMETHICONE 80 MG: 80 TABLET, CHEWABLE ORAL at 12:42

## 2024-10-23 RX ADMIN — IBUPROFEN 800 MG: 800 TABLET, FILM COATED ORAL at 04:28

## 2024-10-23 RX ADMIN — Medication 1 TABLET: at 08:18

## 2024-10-23 RX ADMIN — OXYCODONE 10 MG: 5 TABLET ORAL at 06:18

## 2024-10-23 RX ADMIN — SIMETHICONE 80 MG: 80 TABLET, CHEWABLE ORAL at 19:35

## 2024-10-23 RX ADMIN — ACETAMINOPHEN 1000 MG: 500 TABLET ORAL at 12:36

## 2024-10-23 ASSESSMENT — PAIN DESCRIPTION - DESCRIPTORS
DESCRIPTORS: ACHING;SORE;TENDER
DESCRIPTORS: SORE
DESCRIPTORS: PRESSURE;DISCOMFORT
DESCRIPTORS: ACHING

## 2024-10-23 ASSESSMENT — PAIN SCALES - GENERAL
PAINLEVEL_OUTOF10: 6
PAINLEVEL_OUTOF10: 6
PAINLEVEL_OUTOF10: 7
PAINLEVEL_OUTOF10: 7
PAINLEVEL_OUTOF10: 6
PAINLEVEL_OUTOF10: 7
PAINLEVEL_OUTOF10: 7
PAINLEVEL_OUTOF10: 6

## 2024-10-23 ASSESSMENT — PAIN DESCRIPTION - LOCATION
LOCATION: INCISION;ABDOMEN;BACK
LOCATION: ABDOMEN
LOCATION: INCISION
LOCATION: ABDOMEN;INCISION

## 2024-10-23 ASSESSMENT — PAIN DESCRIPTION - ORIENTATION
ORIENTATION: MID
ORIENTATION: LOWER

## 2024-10-23 ASSESSMENT — PAIN - FUNCTIONAL ASSESSMENT: PAIN_FUNCTIONAL_ASSESSMENT: ACTIVITIES ARE NOT PREVENTED

## 2024-10-23 NOTE — PLAN OF CARE
Problem: Vaginal Birth or  Section  Goal: Fetal and maternal status remain reassuring during the birth process  Description:  Birth OB-Pregnancy care plan goal which identifies if the fetal and maternal status remain reassuring during the birth process  Outcome: Progressing     Problem: Postpartum  Goal: Experiences normal postpartum course  Description:  Postpartum OB-Pregnancy care plan goal which identifies if the mother is experiencing a normal postpartum course  Outcome: Progressing  Goal: Appropriate maternal -  bonding  Description:  Postpartum OB-Pregnancy care plan goal which identifies if the mother and  are bonding appropriately  Outcome: Progressing  Goal: Establishment of infant feeding pattern  Description:  Postpartum OB-Pregnancy care plan goal which identifies if the mother is establishing a feeding pattern with their   Outcome: Progressing  Goal: Incisions, wounds, or drain sites healing without S/S of infection  Outcome: Progressing

## 2024-10-24 VITALS
WEIGHT: 217 LBS | HEIGHT: 66 IN | TEMPERATURE: 98.1 F | SYSTOLIC BLOOD PRESSURE: 122 MMHG | HEART RATE: 90 BPM | OXYGEN SATURATION: 98 % | DIASTOLIC BLOOD PRESSURE: 85 MMHG | BODY MASS INDEX: 34.87 KG/M2 | RESPIRATION RATE: 18 BRPM

## 2024-10-24 PROCEDURE — 99024 POSTOP FOLLOW-UP VISIT: CPT | Performed by: OBSTETRICS & GYNECOLOGY

## 2024-10-24 PROCEDURE — 6360000002 HC RX W HCPCS

## 2024-10-24 PROCEDURE — 6370000000 HC RX 637 (ALT 250 FOR IP)

## 2024-10-24 RX ADMIN — SENNOSIDES AND DOCUSATE SODIUM 2 TABLET: 50; 8.6 TABLET ORAL at 07:56

## 2024-10-24 RX ADMIN — POLYETHYLENE GLYCOL 3350 17 G: 17 POWDER, FOR SOLUTION ORAL at 07:56

## 2024-10-24 RX ADMIN — IBUPROFEN 800 MG: 800 TABLET, FILM COATED ORAL at 06:48

## 2024-10-24 RX ADMIN — ACETAMINOPHEN 1000 MG: 500 TABLET ORAL at 03:32

## 2024-10-24 RX ADMIN — OXYCODONE 10 MG: 5 TABLET ORAL at 07:54

## 2024-10-24 RX ADMIN — ACETAMINOPHEN 1000 MG: 500 TABLET ORAL at 09:39

## 2024-10-24 RX ADMIN — OXYCODONE 10 MG: 5 TABLET ORAL at 11:57

## 2024-10-24 RX ADMIN — OXYCODONE 5 MG: 5 TABLET ORAL at 03:33

## 2024-10-24 RX ADMIN — ENOXAPARIN SODIUM 40 MG: 100 INJECTION SUBCUTANEOUS at 08:03

## 2024-10-24 RX ADMIN — Medication 1 TABLET: at 07:56

## 2024-10-24 RX ADMIN — MAGNESIUM HYDROXIDE 30 ML: 400 SUSPENSION ORAL at 07:56

## 2024-10-24 ASSESSMENT — PAIN SCALES - GENERAL
PAINLEVEL_OUTOF10: 5
PAINLEVEL_OUTOF10: 5
PAINLEVEL_OUTOF10: 7
PAINLEVEL_OUTOF10: 5
PAINLEVEL_OUTOF10: 7

## 2024-10-24 ASSESSMENT — PAIN DESCRIPTION - LOCATION
LOCATION: BACK;INCISION
LOCATION: INCISION;BACK
LOCATION: BACK;INCISION
LOCATION: INCISION
LOCATION: INCISION

## 2024-10-24 ASSESSMENT — PAIN DESCRIPTION - ORIENTATION: ORIENTATION: LOWER

## 2024-10-24 ASSESSMENT — PAIN DESCRIPTION - DESCRIPTORS
DESCRIPTORS: DISCOMFORT;SORE
DESCRIPTORS: DISCOMFORT;SORE
DESCRIPTORS: SORE
DESCRIPTORS: DISCOMFORT;SORE

## 2024-10-24 NOTE — PLAN OF CARE
Problem: Postpartum  Goal: Experiences normal postpartum course  Description:  Postpartum OB-Pregnancy care plan goal which identifies if the mother is experiencing a normal postpartum course  10/24/2024 0055 by Ольга Aquino RN  Outcome: Progressing     Problem: Postpartum  Goal: Appropriate maternal -  bonding  Description:  Postpartum OB-Pregnancy care plan goal which identifies if the mother and  are bonding appropriately  10/24/2024 0055 by Ольга Aquino RN  Outcome: Progressing     Problem: Postpartum  Goal: Establishment of infant feeding pattern  Description:  Postpartum OB-Pregnancy care plan goal which identifies if the mother is establishing a feeding pattern with their   10/24/2024 0055 by Ольга Aquino RN  Outcome: Progressing     Problem: Postpartum  Goal: Incisions, wounds, or drain sites healing without S/S of infection  10/24/2024 0055 by Ольга Aquino RN  Outcome: Progressing     Problem: Pain  Goal: Verbalizes/displays adequate comfort level or baseline comfort level  10/24/2024 0055 by Ольга Aquino RN  Outcome: Progressing     Problem: Infection - Adult  Goal: Absence of infection at discharge  10/24/2024 0055 by Ольга Aquino RN  Outcome: Progressing     Problem: Infection - Adult  Goal: Absence of infection during hospitalization  10/24/2024 0055 by Ольга Aquino RN  Outcome: Progressing     Problem: Infection - Adult  Goal: Absence of fever/infection during anticipated neutropenic period  10/24/2024 0055 by Ольга Aquino RN  Outcome: Progressing     Problem: Safety - Adult  Goal: Free from fall injury  10/24/2024 0055 by Ольга Aquino RN  Outcome: Progressing     Problem: Discharge Planning  Goal: Discharge to home or other facility with appropriate resources  10/24/2024 0055 by Ольга Aquino RN  Outcome: Progressing     Problem: Chronic Conditions and Co-morbidities  Goal: Patient's chronic

## 2024-10-24 NOTE — PROGRESS NOTES
Obstetrical Rounds:    POD/PPD #: 3  Hospital Day: 4  Procedure: repeat  section    Date: 10/24/2024  Time: 9:27 AM        Patient Name: Amarilis Moreno  Patient : 1995  Room/Bed: 0748/0748-01  Admission Date/Time: 10/21/2024  9:55 AM  MRN #: [id number]  [facility] #: [id number]        Attending Physician Statement  I have discussed the care of Amarilis Moreno, including pertinent history and exam findings,  with the resident. I have reviewed their note in the electronic medical record. I have seen and examined the patient and the key elements of all parts of the encounter have been performed/reviewed by me .  I agree with the assessment, plan and orders as documented by the resident. Pt seen & examined. Pt without c/c. Plan discharge later today or tomorrow.     Vitals:    10/24/24 0754   BP: 128/86   Pulse: 85   Resp: 16   Temp: 97.9 °F (36.6 °C)   SpO2: 99%       Admission on 10/21/2024   Component Date Value Ref Range Status    Blood Bank Sample Expiration 10/21/2024 10/24/2024,2359   Final    Arm Band Number 10/21/2024 BE [id number]   Final    ABO/Rh 10/21/2024 O POSITIVE   Final    Antibody Screen 10/21/2024 NEGATIVE   Final    WBC 10/21/2024 6.7  3.5 - 11.3 k/uL Final    RBC 10/21/2024 3.64 (L)  3.95 - 5.11 m/uL Final    Hemoglobin 10/21/2024 11.3 (L)  11.9 - 15.1 g/dL Final    Hematocrit 10/21/2024 33.4 (L)  36.3 - 47.1 % Final    MCV 10/21/2024 91.8  82.6 - 102.9 fL Final    MCH 10/21/2024 31.0  25.2 - 33.5 pg Final    MCHC 10/21/2024 33.8  28.4 - 34.8 g/dL Final    RDW 10/21/2024 13.7  11.8 - 14.4 % Final    Platelets 10/21/2024 202  138 - 453 k/uL Final    MPV 10/21/2024 11.9  8.1 - 13.5 fL Final    NRBC Automated 10/21/2024 0.0  0.0 per 100 WBC Final    Amphetamine Screen, Ur 10/21/2024 NEGATIVE  NEGATIVE Final    Cutoff: 1000 ng/mL    Barbiturate Screen, Ur 10/21/2024 NEGATIVE  NEGATIVE Final    Cutoff: 200 ng/ml    Benzodiazepine Screen, Urine 10/21/2024 NEGATIVE  NEGATIVE Final    Cutoff: 200 ng/ml 
CLINICAL PHARMACY NOTE: MEDS TO BEDS    Total # of Prescriptions Filled: 5   The following medications were delivered to the patient:  Ibuprofen 600mg tabs  Acetaminophen 500mg tabs  Senna-s 8.6-50mg tabs  Oxycodone 5mg tabs  Ferosul 325mg tabs    Additional Documentation:  Delivered to pt + 1 in room 748 on 10/24 at 11:39A. Copay was $8.74 paid with [employer]  
POST OPERATIVE DAY # 1    Amarilis Moreno is a 29 y.o. female   This patient was seen and examined today.     Her pregnancy was complicated by:   Patient Active Problem List   Diagnosis    History of gestational hypertension (G1)    PLTCS 22 F Apg 8/9 Wt 6#14    Hx C/S x2    Pregestational diabetes mellitus, modified White class B    RLTCS 10/21/24 F Apg 8/9 Wt 7#7       Today she is doing well without any chief complaint. Her lochia is light. She denies chest pain, shortness of breath, and headache. She is bottle feeding and she denies any signs or symptoms of mastitis.  She is ambulating well. She is voiding without difficulty. She currently denies S/S of postpartum depression. Flatus present.  Bowel movement absent. She is tolerating solids.    Vital Signs:  Vitals:    10/21/24 1515 10/21/24 1530 10/21/24 1546 10/21/24 1600   BP: 110/61 113/63 (!) 110/55 120/66   Pulse: 65 87 83 82   Resp: 18 18 26 18   Temp:    98.1 °F (36.7 °C)   TempSrc:    Oral   SpO2: 97% 100% 96% 98%   Weight:       Height:            Urine Input & Output last 24hrs:     Intake/Output Summary (Last 24 hours) at 10/22/2024 0400  Last data filed at 10/21/2024 1545  Gross per 24 hour   Intake 1100 ml   Output 2445 ml   Net -1345 ml       Physical Exam:  General:  no apparent distress, alert, and cooperative  Neurologic:  alert, oriented, normal speech, no focal findings or movement disorder noted  Lungs:  No increased work of breathing, good air exchange  Heart:  regular rate    Abdomen: abdomen soft, non-distended, non-tender   Fundus: non-tender, normal size, firm, below umbilicus  Incision: pressure dressing changed to silver dressing. Incision clean/dry/intact  Extremities:  no calf tenderness, non edematous    Labs:  Lab Results   Component Value Date    WBC 6.7 10/21/2024    HGB 11.3 (L) 10/21/2024    HCT 33.4 (L) 10/21/2024    MCV 91.8 10/21/2024     10/21/2024       Assessment/Plan:  Amarilis Moreno is a  POD # 1 s/p 
POST OPERATIVE DAY # 2    Amarilis Moreno is a 29 y.o. female   This patient was seen and examined today.     Her pregnancy was complicated by:   Patient Active Problem List   Diagnosis    History of gestational hypertension (G1)    PLTCS 5/24/22 F Apg 8/9 Wt 6#14    Hx C/S x2    Pregestational diabetes mellitus, modified White class B    RLTCS 10/21/24 F Apg 8/9 Wt 7#7    Post-operative state       Today she is doing well without any chief complaint. Her lochia is light. She denies chest pain, shortness of breath, and headache. She is bottle feeding and she denies any signs or symptoms of mastitis.  She is ambulating well. She is voiding without difficulty. She currently denies S/S of postpartum depression. Flatus present.  Bowel movement absent. She is tolerating solids.    Vital Signs:  Vitals:    10/22/24 0830 10/22/24 1142 10/22/24 1641 10/22/24 2130   BP: 112/65 113/79 107/65 126/70   Pulse: 73 87 81 77   Resp: 16 16 16 16   Temp: 97.8 °F (36.6 °C) 98.1 °F (36.7 °C) 98.3 °F (36.8 °C) 98.2 °F (36.8 °C)   TempSrc: Oral Oral  Oral   SpO2: 98%   97%   Weight:       Height:            Urine Input & Output last 24hrs:     Intake/Output Summary (Last 24 hours) at 10/23/2024 0038  Last data filed at 10/22/2024 1142  Gross per 24 hour   Intake --   Output 1600 ml   Net -1600 ml       Physical Exam:  General:  no apparent distress, alert, and cooperative  Neurologic:  alert, oriented, normal speech, no focal findings or movement disorder noted  Lungs:  No increased work of breathing, good air exchange  Heart:  regular rate    Abdomen: abdomen soft, non-distended, non-tender   Fundus: non-tender, normal size, firm, below umbilicus  Incision: Silver dressing in place with minimal saturation  Extremities:  no calf tenderness, non edematous    Labs:  Lab Results   Component Value Date    WBC 11.2 10/22/2024    HGB 9.8 (L) 10/22/2024    HCT 29.2 (L) 10/22/2024    MCV 94.5 10/22/2024     10/22/2024 
HGB 9.8 (L) 10/22/2024    HCT 29.2 (L) 10/22/2024    MCV 94.5 10/22/2024     10/22/2024       Assessment/Plan:  Amarilis Moreno is a  POD # 3 s/p RLTCS   - Doing wel, VSS    - female infant in General Care Nursery   - Encourage ambulation and use of incentive spirometer   - S/p D/C davis catheter and saline lock IV on POD #1    - CBC completed, Hgb 9.8   - Motrin/tylenol/Reba   - Ancef x24hrs/Keflex/flagyl   - Lovenox 40 mg qD    Rh positive/Rubella immune   - No indication for Rhogam or MMR vaccine    Bottle feeding   - Counseled on s/sx of mastitis  - Denies s/sx      IOL (1095mL)   - POD 1 Hgb 9.8   - Denies s/sx of anemia    - Rx for iron sent for discharge      Pregestational DM   - Diagnosed by early 1hr and 3hr GTT   - Diet controlled    - Encouraged to complete 6 weeks 2hr GTT PP    BMI 35    Continue post-op care.    Counseling Completed:  Secondary Smoke risks and Sudden Infant Death Syndrome were reviewed with recommendations. Infant sleeping, \"back to sleep\" and avoidance of co-sleeping recommendations were reviewed.  Signs and Symptoms of Post Partum Depression were reviewed. The patient is to call if any occur.  Signs and symptoms of Mastitis were reviewed. The patient is to call if any occur for follow up.  Discharge instructions including pelvic rest, incision care, 15 lb weight restriction, no driving with pain medicine and office follow-up were reviewed with patient     Attending Physician: Dr. Susan Choi MD  Ob/Gyn Resident  10/24/2024, 4:37 AM     
F/U with Dr Callaway in 1 to 2 weeks. If you have any nose bleeding that doesn't stop, go to the nearest hospital.

## 2024-10-29 ENCOUNTER — NURSE ONLY (OUTPATIENT)
Dept: OBGYN CLINIC | Age: 29
End: 2024-10-29

## 2024-10-29 VITALS
DIASTOLIC BLOOD PRESSURE: 80 MMHG | WEIGHT: 209.6 LBS | HEART RATE: 72 BPM | SYSTOLIC BLOOD PRESSURE: 128 MMHG | BODY MASS INDEX: 33.68 KG/M2 | HEIGHT: 66 IN

## 2024-10-29 PROCEDURE — 99024 POSTOP FOLLOW-UP VISIT: CPT | Performed by: NURSE PRACTITIONER

## 2024-10-29 NOTE — PROGRESS NOTES
Patient is present for  bandage removal. Patient states 2/10. Patient shows no s/s of infection.  incision- intact, well approximated, clean no erythema increased warmth or drainage, normal induration. Incision cleaned with normal saline.    Patient was educated on incision care: cleaning area with warm soap and water daily, keeping area dry, not lifting more than 15 lbs, using pillows to prop baby when holding and/or nursing. Patient was instructed to call office with any questions.    2 week PP  SILVIA  6 week PP  ALEXANDRE

## 2024-11-04 ENCOUNTER — TELEPHONE (OUTPATIENT)
Dept: OBGYN CLINIC | Age: 29
End: 2024-11-04

## 2024-11-04 NOTE — TELEPHONE ENCOUNTER
Monitor for worsening pain not controlled by motrin/tylenol. Monitor for excessive abdominal tenderness, foul smelling vaginal discharge, fever, chills, nausea, vomiting. The small amount of blood is okay. Will evaluate on Wednesday unless other symptoms arise and needs to be seen sooner.     Dr. Bella

## 2024-11-04 NOTE — TELEPHONE ENCOUNTER
Dime size amt of blood from incision site, has not bled since. Pain between incision site and belly button, area is soft. Pain 4/10 happens when she coughs, gets up from sitting. Patient is taking tylenol and motrin every 6 hours. Appt with Dr. Bella on Wednesday. Patient stopped taking Oxy on Wednesday.

## 2024-11-06 ENCOUNTER — POSTPARTUM VISIT (OUTPATIENT)
Dept: OBGYN CLINIC | Age: 29
End: 2024-11-06

## 2024-11-06 VITALS — SYSTOLIC BLOOD PRESSURE: 118 MMHG | DIASTOLIC BLOOD PRESSURE: 70 MMHG | BODY MASS INDEX: 32.93 KG/M2 | WEIGHT: 204 LBS

## 2024-11-06 DIAGNOSIS — Z98.890 POSTOPERATIVE STATE: ICD-10-CM

## 2024-11-06 DIAGNOSIS — O24.319 PREGESTATIONAL DIABETES MELLITUS, MODIFIED WHITE CLASS B: ICD-10-CM

## 2024-11-06 PROCEDURE — 0503F POSTPARTUM CARE VISIT: CPT | Performed by: STUDENT IN AN ORGANIZED HEALTH CARE EDUCATION/TRAINING PROGRAM

## 2024-11-06 ASSESSMENT — ANXIETY QUESTIONNAIRES
6. BECOMING EASILY ANNOYED OR IRRITABLE: NOT AT ALL
4. TROUBLE RELAXING: NOT AT ALL
1. FEELING NERVOUS, ANXIOUS, OR ON EDGE: NOT AT ALL
5. BEING SO RESTLESS THAT IT IS HARD TO SIT STILL: NOT AT ALL
3. WORRYING TOO MUCH ABOUT DIFFERENT THINGS: NOT AT ALL
IF YOU CHECKED OFF ANY PROBLEMS ON THIS QUESTIONNAIRE, HOW DIFFICULT HAVE THESE PROBLEMS MADE IT FOR YOU TO DO YOUR WORK, TAKE CARE OF THINGS AT HOME, OR GET ALONG WITH OTHER PEOPLE: NOT DIFFICULT AT ALL
2. NOT BEING ABLE TO STOP OR CONTROL WORRYING: NOT AT ALL
7. FEELING AFRAID AS IF SOMETHING AWFUL MIGHT HAPPEN: NOT AT ALL
GAD7 TOTAL SCORE: 0

## 2024-11-06 NOTE — PROGRESS NOTES
Postpartum Visit    Amarilis Moreno is a 29 y.o. female , Post Partum    The patient was seen. Reports some occasional midline pain with movement (below the umbilicus). Bowel/bladder normal. No fundal tenderness. No foul smelling discharge. Discussed healing process, abdominal binder, motrin. Discussed warning signs of endometritis or hernia. No hernia palpated on exam today. No s/s endometritis.    She is  breast feeding and denies signs or symptoms of mastitis.  The patient completed the E.P.D.S. Post Partum Depression Evaluation form and EPDS Score of 1. She does not have signs or symptoms of post partum depression. She denies any suicidal thoughts with a plan, intent to harm others, and delusional ideas. She does admit to having good home support. Today her lochia is light she denies any dizziness or shortness of breath.  Her bowels are regular and she denies any urinary tract symptomology. She is using abstinence for contraception.     Her pregnancy was complicated by:  Patient Active Problem List    Diagnosis Date Noted    History of gestational hypertension (G1) 2022     Priority: Medium    PLTCS 22 F Apg 8/9 Wt 6#14 2022     Priority: Medium    Post-operative state 10/22/2024    RLTCS 10/21/24 F Apg 8/9 Wt 7#7 10/21/2024    Pregestational diabetes mellitus, modified White class B 2024     Overview Note:     Diagnosed from early GTT in pregnancy  Diet controlled      Hx C/S x2 2024       Vitals:   Blood pressure 118/70, weight 92.5 kg (204 lb), last menstrual period 2024, not currently breastfeeding.    Physical Exam:  Chaperone for Exam: Chaperone was offered and patient declined    General:  no apparent distress, alert, and cooperative  Lungs:  No increased work of breathing, no conversational dyspnea  Heart: Regular rate and rhythm  Abdomen: Abdomen soft, non-tender, no rebound, guarding, rigidity  Fundus: non-tender, normal size, firm, below umbilicus  Incision:

## 2024-11-07 ENCOUNTER — PATIENT MESSAGE (OUTPATIENT)
Dept: OBGYN CLINIC | Age: 29
End: 2024-11-07

## 2024-11-14 NOTE — TELEPHONE ENCOUNTER
Hi Amarilis,     Based on your exam everything felt good and without concerns for hernia or infection. Use abdominal binder to help as well. As long as eating and drink well and able to have normal bowel movements and bladder emptying then continue as is. We can consider ordering imaging in the future if you would like, however everything seems okay right now. Thank you! Let us know if you have further questions.     Dr. Bella

## 2024-11-21 PROBLEM — Z98.890 POST-OPERATIVE STATE: Status: RESOLVED | Noted: 2024-10-22 | Resolved: 2024-11-21

## 2024-12-03 ENCOUNTER — HOSPITAL ENCOUNTER (OUTPATIENT)
Age: 29
Setting detail: SPECIMEN
Discharge: HOME | End: 2024-12-03

## 2024-12-03 DIAGNOSIS — R30.0 BURNING WITH URINATION: ICD-10-CM

## 2024-12-03 DIAGNOSIS — R30.0 BURNING WITH URINATION: Primary | ICD-10-CM

## 2024-12-03 LAB
BACTERIA URNS QL MICRO: ABNORMAL
BILIRUB UR QL STRIP: NEGATIVE
CLARITY UR: ABNORMAL
COLOR UR: YELLOW
EPI CELLS #/AREA URNS HPF: ABNORMAL /HPF (ref 0–5)
GLUCOSE UR STRIP-MCNC: NEGATIVE MG/DL
HGB UR QL STRIP.AUTO: NEGATIVE
KETONES UR STRIP-MCNC: NEGATIVE MG/DL
LEUKOCYTE ESTERASE UR QL STRIP: ABNORMAL
NITRITE UR QL STRIP: NEGATIVE
PH UR STRIP: 6.5 [PH] (ref 5–8)
PROT UR STRIP-MCNC: NEGATIVE MG/DL
RBC #/AREA URNS HPF: ABNORMAL /HPF (ref 0–2)
SP GR UR STRIP: 1.01 (ref 1–1.03)
UROBILINOGEN UR STRIP-ACNC: NORMAL EU/DL (ref 0–1)
WBC #/AREA URNS HPF: ABNORMAL /HPF (ref 0–5)
YEAST URNS QL MICRO: ABNORMAL

## 2024-12-04 LAB
MICROORGANISM SPEC CULT: NORMAL
SERVICE CMNT-IMP: NORMAL
SPECIMEN DESCRIPTION: NORMAL

## 2024-12-05 ENCOUNTER — TELEPHONE (OUTPATIENT)
Dept: OBGYN CLINIC | Age: 29
End: 2024-12-05

## 2024-12-05 DIAGNOSIS — B37.9 YEAST INFECTION: Primary | ICD-10-CM

## 2024-12-05 RX ORDER — NITROFURANTOIN 25; 75 MG/1; MG/1
100 CAPSULE ORAL 2 TIMES DAILY
Qty: 10 CAPSULE | Refills: 0 | Status: SHIPPED | OUTPATIENT
Start: 2024-12-05 | End: 2024-12-10

## 2024-12-05 RX ORDER — FLUCONAZOLE 150 MG/1
150 TABLET ORAL
Qty: 2 TABLET | Refills: 0 | Status: SHIPPED | OUTPATIENT
Start: 2024-12-05

## 2024-12-05 RX ORDER — FLUCONAZOLE 150 MG/1
150 TABLET ORAL DAILY
Qty: 2 TABLET | Refills: 0 | Status: SHIPPED | OUTPATIENT
Start: 2024-12-05 | End: 2024-12-07

## 2024-12-06 ENCOUNTER — POSTPARTUM VISIT (OUTPATIENT)
Dept: OBGYN CLINIC | Age: 29
End: 2024-12-06

## 2024-12-06 VITALS
WEIGHT: 208.38 LBS | SYSTOLIC BLOOD PRESSURE: 112 MMHG | BODY MASS INDEX: 33.49 KG/M2 | DIASTOLIC BLOOD PRESSURE: 68 MMHG | HEIGHT: 66 IN

## 2024-12-06 DIAGNOSIS — R10.2 PELVIC PRESSURE IN FEMALE: ICD-10-CM

## 2024-12-06 DIAGNOSIS — M62.89 PELVIC FLOOR DYSFUNCTION: ICD-10-CM

## 2024-12-06 DIAGNOSIS — Z98.890 POSTOPERATIVE STATE: Primary | ICD-10-CM

## 2024-12-06 PROCEDURE — 0503F POSTPARTUM CARE VISIT: CPT | Performed by: OBSTETRICS & GYNECOLOGY

## 2024-12-06 ASSESSMENT — ANXIETY QUESTIONNAIRES
3. WORRYING TOO MUCH ABOUT DIFFERENT THINGS: NOT AT ALL
6. BECOMING EASILY ANNOYED OR IRRITABLE: NOT AT ALL
GAD7 TOTAL SCORE: 0
1. FEELING NERVOUS, ANXIOUS, OR ON EDGE: NOT AT ALL
IF YOU CHECKED OFF ANY PROBLEMS ON THIS QUESTIONNAIRE, HOW DIFFICULT HAVE THESE PROBLEMS MADE IT FOR YOU TO DO YOUR WORK, TAKE CARE OF THINGS AT HOME, OR GET ALONG WITH OTHER PEOPLE: NOT DIFFICULT AT ALL
7. FEELING AFRAID AS IF SOMETHING AWFUL MIGHT HAPPEN: NOT AT ALL
4. TROUBLE RELAXING: NOT AT ALL
5. BEING SO RESTLESS THAT IT IS HARD TO SIT STILL: NOT AT ALL
2. NOT BEING ABLE TO STOP OR CONTROL WORRYING: NOT AT ALL

## 2024-12-06 NOTE — PROGRESS NOTES
Amarilis Moreno  3:20 PM  24    The patient was seen. She has no chief complaints today. She delivered by  section on 10/21/2024. She is not breast feeding and there is not any signs or symptoms of mastitis.   She does not have any signs or symptoms of post partum depression. She denies any suicidal thoughts with a plan, intent to harm others, and delusional ideas. Today her lochia is light she denies any dizziness or shortness of breath.      Her pregnancy was complicated by:   Patient Active Problem List    Diagnosis Date Noted    History of gestational hypertension (G1) 2022     Priority: Medium    PLTCS 22 F Apg 8/9 Wt 6#14 2022     Priority: Medium    RLTCS 10/21/24 F Apg 8/9 Wt 7#7 10/21/2024    Pregestational diabetes mellitus, modified White class B 2024     Overview Note:     Diagnosed from early GTT in pregnancy  Diet controlled      Hx C/S x2 2024         She does admit to having good home support. Her bowels are regular and she denies any urinary tract symptomology.    OB History    Para Term  AB Living   2 2 2 0 0 2   SAB IAB Ectopic Molar Multiple Live Births   0 0 0 0 0 2           Blood pressure 112/68, height 1.676 m (5' 6\"), weight 94.5 kg (208 lb 6 oz), last menstrual period 2024, not currently breastfeeding.    Abdomen: Soft and non-tender; good bowel sounds; no guarding, rebound or rigidity; no CVA tenderness bilaterally.    Incision: Clean, Dry and Intact without signs or symptoms of infection.    Extremities: No calf tenderness bilaterally. DTR 2/4 bilaterally. No edema.      Assessment:   Diagnosis Orders   1. RLTCS 10/21/24 F Apg 8/9 Wt 7#7        2. Pelvic floor dysfunction        3. Pelvic pressure in female          Chief Complaint   Patient presents with    Postpartum Care     Plan:  1. Return to the office for annual or as needed  2. Signs & Symptoms of mastitis reviewed; notify if occurs  3. Secondary smoke risks

## 2025-01-03 NOTE — TELEPHONE ENCOUNTER
Patient calling in regards to a reoccured UTI and was wondering if a prescription Diflucan 150mg can be sent to [facility] in [facility] in [place]

## 2025-01-03 NOTE — TELEPHONE ENCOUNTER
Pt called back on 1/3 12:57p to see if script could get sent in this weekend. She also asked if Macrobid could be sent in as well.

## 2025-01-04 RX ORDER — FLUCONAZOLE 150 MG/1
150 TABLET ORAL
Qty: 2 TABLET | Refills: 0 | Status: SHIPPED | OUTPATIENT
Start: 2025-01-04

## 2025-01-04 NOTE — TELEPHONE ENCOUNTER
Will send diflucan. She was just on a course of Macrobid a month ago and had negative culture. Would not recommend prescribing antibiotics without being seen or urine tested. Increase hydration.

## 2025-01-27 NOTE — TELEPHONE ENCOUNTER
Pt calling into office in regards to possible yeat infection.   Itchy, odor, discharge    Patient was wondering if a prescription for Diflucan  and Macrobid can be sent to [facility] [place]    Patient is scheduled with Jena on 3/4/25 @ 145pm

## 2025-01-28 RX ORDER — FLUCONAZOLE 150 MG/1
150 TABLET ORAL
Qty: 2 TABLET | Refills: 0 | Status: SHIPPED | OUTPATIENT
Start: 2025-01-28

## 2025-01-29 DIAGNOSIS — N89.8 VAGINAL IRRITATION: Primary | ICD-10-CM

## 2025-01-29 DIAGNOSIS — N89.8 VAGINAL ITCHING: ICD-10-CM

## 2025-03-04 ENCOUNTER — OFFICE VISIT (OUTPATIENT)
Dept: OBGYN CLINIC | Age: 30
End: 2025-03-04
Payer: COMMERCIAL

## 2025-03-04 ENCOUNTER — HOSPITAL ENCOUNTER (OUTPATIENT)
Age: 30
Setting detail: SPECIMEN
Discharge: HOME | End: 2025-03-04

## 2025-03-04 VITALS — DIASTOLIC BLOOD PRESSURE: 82 MMHG | WEIGHT: 223 LBS | BODY MASS INDEX: 35.99 KG/M2 | SYSTOLIC BLOOD PRESSURE: 118 MMHG

## 2025-03-04 DIAGNOSIS — Z86.39 HISTORY OF DIET-CONTROLLED DIABETES: ICD-10-CM

## 2025-03-04 DIAGNOSIS — B37.31 YEAST VAGINITIS: Primary | ICD-10-CM

## 2025-03-04 DIAGNOSIS — B37.31 YEAST VAGINITIS: ICD-10-CM

## 2025-03-04 DIAGNOSIS — Z12.4 SCREENING FOR CERVICAL CANCER: ICD-10-CM

## 2025-03-04 LAB
EST. AVERAGE GLUCOSE BLD GHB EST-MCNC: 91 MG/DL
HBA1C MFR BLD: 4.8 % (ref 4–6)

## 2025-03-04 PROCEDURE — 99212 OFFICE O/P EST SF 10 MIN: CPT | Performed by: STUDENT IN AN ORGANIZED HEALTH CARE EDUCATION/TRAINING PROGRAM

## 2025-03-04 PROCEDURE — 99459 PELVIC EXAMINATION: CPT | Performed by: STUDENT IN AN ORGANIZED HEALTH CARE EDUCATION/TRAINING PROGRAM

## 2025-03-04 RX ORDER — HYDROGEN PEROXIDE 2.65 ML/100ML
1 LIQUID TOPICAL NIGHTLY
COMMUNITY
Start: 2025-02-10

## 2025-03-04 RX ORDER — PROBIOTIC PRODUCT - TAB 1B-250 MG
1 TAB ORAL DAILY
COMMUNITY
Start: 2025-02-09

## 2025-03-04 NOTE — PROGRESS NOTES
female  recurrent yeast vaginitis  -vaginitis collected. Declined gc/c  -continue current treatment regimen    Health Maintenance  -pap collected      Diagnosis Orders   1. Yeast vaginitis  Vaginitis DNA Probe      2. History of diet-controlled diabetes  Hemoglobin A1C      3. Screening for cervical cancer  PAP SMEAR          Patient Active Problem List    Diagnosis Date Noted    History of gestational hypertension (G1) 2022     Priority: Medium    PLTCS 22 F Apg 8/9 Wt 6#14 2022     Priority: Medium    RLTCS 10/21/24 F Apg 8/9 Wt 7#7 10/21/2024    Pregestational diabetes mellitus, modified White class B 2024     Diagnosed from early GTT in pregnancy  Diet controlled      Hx C/S x2 2024     Return in about 1 year (around 3/4/2026) for well woman.    Counseling:   Annual well-woman exam  Cervical Cytology Evaluation starting at age 21 and every 3-5 years depending on age and results and ASC guidelines  Counseled on preventative health maintenance and follow-up  Orders Placed This Encounter   Procedures    Vaginitis DNA Probe     Standing Status:   Future     Number of Occurrences:   1     Standing Expiration Date:   3/4/2026    Hemoglobin A1C     Standing Status:   Future     Number of Occurrences:   1     Standing Expiration Date:   3/4/2026    PAP SMEAR     Standing Status:   Future     Standing Expiration Date:   3/4/2026     Order Specific Question:   Collection Type     Answer:   Thin Prep     Order Specific Question:   Prior Abnormal Pap Test     Answer:   No     Order Specific Question:   Screening or Diagnostic     Answer:   Screening     Order Specific Question:   HPV Requested?     Answer:   Yes -  If ASCUS Reflex HPV     Order Specific Question:   High Risk Patient     Answer:   N/A       DO Ryanne Ortega  [address]   [id number]  [facility], [address]  3/4/2025, 8:45 PM

## 2025-03-05 LAB
CANDIDA SPECIES: POSITIVE
GARDNERELLA VAGINALIS: POSITIVE
SOURCE: ABNORMAL
TRICHOMONAS: NEGATIVE

## 2025-03-05 RX ORDER — METRONIDAZOLE 500 MG/1
500 TABLET ORAL 2 TIMES DAILY
Qty: 14 TABLET | Refills: 0 | Status: SHIPPED | OUTPATIENT
Start: 2025-03-05 | End: 2025-03-12

## 2025-03-05 RX ORDER — FLUCONAZOLE 150 MG/1
150 TABLET ORAL
Qty: 2 TABLET | Refills: 0 | Status: SHIPPED | OUTPATIENT
Start: 2025-03-05

## 2025-03-11 LAB — CYTOLOGY REPORT: NORMAL

## 2025-03-13 ENCOUNTER — RESULTS FOLLOW-UP (OUTPATIENT)
Dept: OBGYN CLINIC | Age: 30
End: 2025-03-13

## 2025-03-21 RX ORDER — FLUCONAZOLE 150 MG/1
150 TABLET ORAL
Qty: 2 TABLET | Refills: 0 | Status: SHIPPED | OUTPATIENT
Start: 2025-03-21

## 2025-03-21 RX ORDER — NYSTATIN 100000 U/G
OINTMENT TOPICAL
Qty: 30 G | Refills: 0 | Status: SHIPPED | OUTPATIENT
Start: 2025-03-21

## 2025-05-19 RX ORDER — FLUCONAZOLE 150 MG/1
150 TABLET ORAL
Qty: 2 TABLET | Refills: 1 | Status: SHIPPED | OUTPATIENT
Start: 2025-05-19

## 2025-06-13 ENCOUNTER — TELEPHONE (OUTPATIENT)
Dept: OBGYN CLINIC | Age: 30
End: 2025-06-13

## 2025-06-15 RX ORDER — METRONIDAZOLE 500 MG/1
500 TABLET ORAL 2 TIMES DAILY
Qty: 14 TABLET | Refills: 0 | Status: SHIPPED | OUTPATIENT
Start: 2025-06-15 | End: 2025-06-22

## 2025-06-16 ENCOUNTER — OFFICE VISIT (OUTPATIENT)
Dept: OBGYN CLINIC | Age: 30
End: 2025-06-16
Payer: COMMERCIAL

## 2025-06-16 ENCOUNTER — HOSPITAL ENCOUNTER (OUTPATIENT)
Age: 30
Setting detail: SPECIMEN
Discharge: HOME | End: 2025-06-16

## 2025-06-16 VITALS — WEIGHT: 212 LBS | BODY MASS INDEX: 34.22 KG/M2 | DIASTOLIC BLOOD PRESSURE: 62 MMHG | SYSTOLIC BLOOD PRESSURE: 118 MMHG

## 2025-06-16 DIAGNOSIS — N76.0 RECURRENT VAGINITIS: Primary | ICD-10-CM

## 2025-06-16 DIAGNOSIS — N89.8 VAGINAL DISCHARGE: ICD-10-CM

## 2025-06-16 DIAGNOSIS — N76.0 RECURRENT VAGINITIS: ICD-10-CM

## 2025-06-16 LAB
CANDIDA SPECIES: POSITIVE
GARDNERELLA VAGINALIS: NEGATIVE
SOURCE: ABNORMAL
TRICHOMONAS: NEGATIVE

## 2025-06-16 PROCEDURE — 99213 OFFICE O/P EST LOW 20 MIN: CPT | Performed by: STUDENT IN AN ORGANIZED HEALTH CARE EDUCATION/TRAINING PROGRAM

## 2025-06-16 ASSESSMENT — PATIENT HEALTH QUESTIONNAIRE - PHQ9
SUM OF ALL RESPONSES TO PHQ QUESTIONS 1-9: 0
1. LITTLE INTEREST OR PLEASURE IN DOING THINGS: NOT AT ALL
SUM OF ALL RESPONSES TO PHQ QUESTIONS 1-9: 0
2. FEELING DOWN, DEPRESSED OR HOPELESS: NOT AT ALL
SUM OF ALL RESPONSES TO PHQ QUESTIONS 1-9: 0
SUM OF ALL RESPONSES TO PHQ9 QUESTIONS 1 & 2: 0
1. LITTLE INTEREST OR PLEASURE IN DOING THINGS: NOT AT ALL
SUM OF ALL RESPONSES TO PHQ QUESTIONS 1-9: 0
2. FEELING DOWN, DEPRESSED OR HOPELESS: NOT AT ALL

## 2025-06-16 NOTE — PROGRESS NOTES
Procedure Laterality Date     SECTION N/A 2022     SECTION performed by Lorenzo Castellanos MD at [facility] OR     SECTION N/A 10/21/2024     SECTION performed by Servando Britt DO at [facility]       Medications:  Current Outpatient Medications   Medication Sig Dispense Refill    metroNIDAZOLE (FLAGYL) 500 MG tablet Take 1 tablet by mouth 2 times daily for 7 days 14 tablet 0    fluconazole (DIFLUCAN) 150 MG tablet Take 1 tablet by mouth every 72 hours as needed (Yeast infection) 2 tablet 1    fluconazole (DIFLUCAN) 150 MG tablet Take 1 tablet by mouth every 72 hours as needed (Yeast infection) 2 tablet 0    nystatin (MYCOSTATIN) 877357 UNIT/GM ointment Apply topically 2 times daily. 30 g 0    MICONAZOLE 7 2 % vaginal cream Place 1 applicator vaginally nightly      Probiotic Product (WILBUR-BID PROBIOTIC) TABS Take 1 tablet by mouth daily      Lactobacillus (PROBIOTIC ACIDOPHILUS) CAPS Take 1 tablet by mouth daily 120 capsule 3     No current facility-administered medications for this visit.       Allergies:  Allergies as of 2025 - Fully Reviewed 2025   Allergen Reaction Noted    Latex Hives 10/25/2021                                   Vitals:  /62 (BP Site: Left Upper Arm, Patient Position: Sitting, BP Cuff Size: Medium Adult)   Wt 96.2 kg (212 lb)   LMP 2025 (Approximate)   BMI 34.22 kg/m²                                                                                                                                                                        Physical Exam:   General Appearance: Appears healthy.  Alert; in no acute distress.  Pleasant.  Skin: No rashes or lesions on visible skin.  HEENT: normocephalic and atraumatic.   Respiratory: No conversational dyspnea.  No increased work of breathing.  Cardiovascular: Regular rate and rhythm.  Abdomen: soft, nontender, nondistended, no abnormal masses  Pelvic Exam:

## 2025-06-17 ENCOUNTER — RESULTS FOLLOW-UP (OUTPATIENT)
Dept: OBGYN | Age: 30
End: 2025-06-17

## 2025-06-17 DIAGNOSIS — B37.31 YEAST VAGINITIS: Primary | ICD-10-CM

## 2025-06-17 RX ORDER — FLUCONAZOLE 150 MG/1
150 TABLET ORAL WEEKLY
Qty: 27 TABLET | Refills: 0 | Status: SHIPPED | OUTPATIENT
Start: 2025-06-17 | End: 2025-06-18

## 2025-06-18 ENCOUNTER — TELEPHONE (OUTPATIENT)
Dept: OBGYN CLINIC | Age: 30
End: 2025-06-18

## 2025-06-18 DIAGNOSIS — B37.31 YEAST VAGINITIS: Primary | ICD-10-CM

## 2025-06-18 RX ORDER — FLUCONAZOLE 150 MG/1
150 TABLET ORAL WEEKLY
Qty: 27 TABLET | Refills: 0 | Status: SHIPPED | OUTPATIENT
Start: 2025-06-18

## 2025-06-18 NOTE — TELEPHONE ENCOUNTER
Patient does not use [facility]. Patient would like to know if she should start to use the flagyl that was prescribed by Dr. Britt on 6/15 and if she can continue to use the nystatin.

## 2025-06-19 LAB
MICROORGANISM SPEC CULT: ABNORMAL
SERVICE CMNT-IMP: ABNORMAL
SPECIMEN DESCRIPTION: ABNORMAL

## (undated) DEVICE — KENDALL SCD EXPRESS SLEEVES, KNEE LENGTH, MEDIUM: Brand: KENDALL SCD

## (undated) DEVICE — SOLUTION SOD CHL 0.9% 1000ML

## (undated) DEVICE — SOLUTION IRRIGATION STRL H2O 1000 ML UROMATIC CONTAINER

## (undated) DEVICE — SUTURE VCRL SZ 1 L36IN ABSRB VLT L36MM CT-1 1/2 CIR J347H

## (undated) DEVICE — TOWEL SURG W16XL26IN WHT NONFENESTRATED ST 2 PER PK

## (undated) DEVICE — STERILE POLYISOPRENE POWDER-FREE SURGICAL GLOVES WITH EMOLLIENT COATING: Brand: PROTEXIS

## (undated) DEVICE — SUTURE CHROMIC GUT NO 1 GS-25 36IN ABSRB MFIL CG905

## (undated) DEVICE — SUTURE VICRYL SZ 0 L36IN ABSRB UD L36MM CT-1 1/2 CIR J946H

## (undated) DEVICE — Device

## (undated) DEVICE — STAPLER SKIN L39MM DIA0.53MM CRWN 5.7MM S STL FIX HD PROX

## (undated) DEVICE — PREVENA INCISION MANAGEMENT SYSTEM- PEEL & PLACE DRESSING: Brand: PREVENA™ PEEL & PLACE™

## (undated) DEVICE — SUTURE PDS II SZ 1 L36IN ABSRB VLT CT L40MM 1/2 CIR TAPR Z359T

## (undated) DEVICE — TRAY SPNL 24GA L4IN PENCAN PNCL PNT NDL 0.75% BIPIVCAIN W/

## (undated) DEVICE — SUTURE VCRL SZ 2-0 L36IN ABSRB VLT L36MM CT-1 1/2 CIR J345H

## (undated) DEVICE — 450 ML BOTTLE OF 0.05% CHLORHEXIDINE GLUCONATE IN 99.95% STERILE WATER FOR IRRIGATION, USP AND APPLICATOR.: Brand: IRRISEPT ANTIMICROBIAL WOUND LAVAGE

## (undated) DEVICE — Z DUP USE 2522782 SOLUTION IRRIG 1000ML STRL H2O PLAS CONTAINER UROMATIC

## (undated) DEVICE — SUTURE VICRYL SZ 2-0 L36IN ABSRB VLT L36MM CT-1 1/2 CIR J345H

## (undated) DEVICE — SUTURE MONOCRYL SZ 4-0 L18IN ABSRB UD L19MM PS-2 3/8 CIR PRIM Y496G